# Patient Record
Sex: FEMALE | Race: WHITE | NOT HISPANIC OR LATINO | Employment: UNEMPLOYED | URBAN - METROPOLITAN AREA
[De-identification: names, ages, dates, MRNs, and addresses within clinical notes are randomized per-mention and may not be internally consistent; named-entity substitution may affect disease eponyms.]

---

## 2019-09-03 ENCOUNTER — OFFICE VISIT (OUTPATIENT)
Dept: FAMILY MEDICINE | Facility: CLINIC | Age: 54
End: 2019-09-03
Payer: COMMERCIAL

## 2019-09-03 VITALS
SYSTOLIC BLOOD PRESSURE: 100 MMHG | WEIGHT: 187.13 LBS | DIASTOLIC BLOOD PRESSURE: 72 MMHG | OXYGEN SATURATION: 99 % | BODY MASS INDEX: 31.95 KG/M2 | HEART RATE: 68 BPM | HEIGHT: 64 IN | RESPIRATION RATE: 18 BRPM

## 2019-09-03 DIAGNOSIS — I10 HYPERTENSION, UNSPECIFIED TYPE: Primary | ICD-10-CM

## 2019-09-03 DIAGNOSIS — E78.5 HYPERLIPIDEMIA, UNSPECIFIED HYPERLIPIDEMIA TYPE: ICD-10-CM

## 2019-09-03 DIAGNOSIS — Z12.39 BREAST CANCER SCREENING: ICD-10-CM

## 2019-09-03 DIAGNOSIS — Z23 NEED FOR SHINGLES VACCINE: ICD-10-CM

## 2019-09-03 DIAGNOSIS — Z11.59 NEED FOR HEPATITIS C SCREENING TEST: ICD-10-CM

## 2019-09-03 DIAGNOSIS — Z13.820 SCREENING FOR OSTEOPOROSIS: ICD-10-CM

## 2019-09-03 PROCEDURE — 99204 PR OFFICE/OUTPT VISIT, NEW, LEVL IV, 45-59 MIN: ICD-10-PCS | Mod: S$GLB,,, | Performed by: NURSE PRACTITIONER

## 2019-09-03 PROCEDURE — 99999 PR PBB SHADOW E&M-NEW PATIENT-LVL IV: ICD-10-PCS | Mod: PBBFAC,,, | Performed by: NURSE PRACTITIONER

## 2019-09-03 PROCEDURE — 3008F BODY MASS INDEX DOCD: CPT | Mod: S$GLB,,, | Performed by: NURSE PRACTITIONER

## 2019-09-03 PROCEDURE — 3008F PR BODY MASS INDEX (BMI) DOCUMENTED: ICD-10-PCS | Mod: S$GLB,,, | Performed by: NURSE PRACTITIONER

## 2019-09-03 PROCEDURE — 99204 OFFICE O/P NEW MOD 45 MIN: CPT | Mod: S$GLB,,, | Performed by: NURSE PRACTITIONER

## 2019-09-03 PROCEDURE — 99999 PR PBB SHADOW E&M-NEW PATIENT-LVL IV: CPT | Mod: PBBFAC,,, | Performed by: NURSE PRACTITIONER

## 2019-09-03 RX ORDER — MAGNESIUM 200 MG
2 TABLET ORAL DAILY
COMMUNITY
End: 2019-09-09 | Stop reason: DRUGHIGH

## 2019-09-03 RX ORDER — NEBIVOLOL 5 MG/1
5 TABLET ORAL DAILY
COMMUNITY
End: 2019-09-03 | Stop reason: SDUPTHER

## 2019-09-03 RX ORDER — MAGNESIUM HYDROXIDE 400 MG/5ML
5000 SUSPENSION, ORAL (FINAL DOSE FORM) ORAL DAILY
COMMUNITY

## 2019-09-03 RX ORDER — NEBIVOLOL 5 MG/1
5 TABLET ORAL DAILY
Qty: 90 TABLET | Refills: 3 | Status: SHIPPED | OUTPATIENT
Start: 2019-09-03 | End: 2020-08-03

## 2019-09-03 RX ORDER — DOCUSATE SODIUM 100 MG/1
2 CAPSULE, LIQUID FILLED ORAL DAILY
Status: ON HOLD | COMMUNITY
End: 2019-10-21 | Stop reason: HOSPADM

## 2019-09-03 RX ORDER — TRIAMTERENE/HYDROCHLOROTHIAZID 37.5-25 MG
1 TABLET ORAL DAILY
Qty: 90 TABLET | Refills: 3 | Status: SHIPPED | OUTPATIENT
Start: 2019-09-03 | End: 2020-09-21

## 2019-09-03 RX ORDER — AMLODIPINE BESYLATE 2.5 MG/1
2.5 TABLET ORAL DAILY
Qty: 90 TABLET | Refills: 3 | Status: SHIPPED | OUTPATIENT
Start: 2019-09-03 | End: 2020-08-31

## 2019-09-03 RX ORDER — MV/FA/DHA/EPA/FISH OIL/SAW/GNK 400MCG-200
500 COMBINATION PACKAGE (EA) ORAL DAILY
COMMUNITY
End: 2020-04-22

## 2019-09-03 RX ORDER — GUAIFENESIN AND PHENYLEPHRINE HCL 400; 10 MG/1; MG/1
500 TABLET ORAL DAILY
COMMUNITY

## 2019-09-03 RX ORDER — ACETAMINOPHEN 500 MG
5000 TABLET ORAL DAILY
COMMUNITY

## 2019-09-03 RX ORDER — TRIAMTERENE/HYDROCHLOROTHIAZID 37.5-25 MG
TABLET ORAL
COMMUNITY
End: 2019-09-03 | Stop reason: SDUPTHER

## 2019-09-03 RX ORDER — AMLODIPINE BESYLATE 2.5 MG/1
2.5 TABLET ORAL DAILY
COMMUNITY
End: 2019-09-03 | Stop reason: SDUPTHER

## 2019-09-05 ENCOUNTER — LAB VISIT (OUTPATIENT)
Dept: LAB | Facility: HOSPITAL | Age: 54
End: 2019-09-05
Attending: NURSE PRACTITIONER
Payer: COMMERCIAL

## 2019-09-05 DIAGNOSIS — E78.5 HYPERLIPIDEMIA, UNSPECIFIED HYPERLIPIDEMIA TYPE: ICD-10-CM

## 2019-09-05 DIAGNOSIS — Z11.59 NEED FOR HEPATITIS C SCREENING TEST: ICD-10-CM

## 2019-09-05 LAB
CHOLEST SERPL-MCNC: 143 MG/DL (ref 120–199)
CHOLEST/HDLC SERPL: 5.3 {RATIO} (ref 2–5)
HDLC SERPL-MCNC: 27 MG/DL (ref 40–75)
HDLC SERPL: 18.9 % (ref 20–50)
LDLC SERPL CALC-MCNC: 47.8 MG/DL (ref 63–159)
NONHDLC SERPL-MCNC: 116 MG/DL
TRIGL SERPL-MCNC: 341 MG/DL (ref 30–150)

## 2019-09-05 PROCEDURE — 80061 LIPID PANEL: CPT

## 2019-09-05 PROCEDURE — 36415 COLL VENOUS BLD VENIPUNCTURE: CPT

## 2019-09-05 PROCEDURE — 86803 HEPATITIS C AB TEST: CPT

## 2019-09-06 LAB — HCV AB S/CO SERPL IA: <0.1 S/CO RATIO (ref 0–0.9)

## 2019-09-09 ENCOUNTER — TELEPHONE (OUTPATIENT)
Dept: SURGERY | Facility: CLINIC | Age: 54
End: 2019-09-09

## 2019-09-09 ENCOUNTER — OFFICE VISIT (OUTPATIENT)
Dept: SURGERY | Facility: CLINIC | Age: 54
End: 2019-09-09
Payer: COMMERCIAL

## 2019-09-09 VITALS
SYSTOLIC BLOOD PRESSURE: 146 MMHG | HEIGHT: 64 IN | TEMPERATURE: 99 F | DIASTOLIC BLOOD PRESSURE: 93 MMHG | HEART RATE: 83 BPM | WEIGHT: 188 LBS | BODY MASS INDEX: 32.1 KG/M2

## 2019-09-09 DIAGNOSIS — K57.32 DIVERTICULITIS OF LARGE INTESTINE WITHOUT PERFORATION OR ABSCESS WITHOUT BLEEDING: Primary | ICD-10-CM

## 2019-09-09 PROCEDURE — 99999 PR PBB SHADOW E&M-EST. PATIENT-LVL III: ICD-10-PCS | Mod: PBBFAC,,, | Performed by: SURGERY

## 2019-09-09 PROCEDURE — 99204 PR OFFICE/OUTPT VISIT, NEW, LEVL IV, 45-59 MIN: ICD-10-PCS | Mod: S$GLB,,, | Performed by: SURGERY

## 2019-09-09 PROCEDURE — 99204 OFFICE O/P NEW MOD 45 MIN: CPT | Mod: S$GLB,,, | Performed by: SURGERY

## 2019-09-09 PROCEDURE — 99999 PR PBB SHADOW E&M-EST. PATIENT-LVL III: CPT | Mod: PBBFAC,,, | Performed by: SURGERY

## 2019-09-09 RX ORDER — ZINC GLUCONATE 50 MG
1 TABLET ORAL DAILY
COMMUNITY

## 2019-09-09 NOTE — PROGRESS NOTES
SUBJECTIVE:      Patient ID: Dainela Lawson is a 54 y.o. female.    Chief Complaint: Establish Care    Establishing care - recently moved here from [place] with her , no physical complaints today, no prior PCP    Discussed outstanding health maintenance - established with Dr. Field for gyn    Hypertension   This is a chronic problem. The current episode started more than 1 year ago. The problem is controlled. Pertinent negatives include no chest pain, neck pain, palpitations or shortness of breath. Risk factors for coronary artery disease include dyslipidemia, obesity, post-menopausal state, sedentary lifestyle and family history. Past treatments include beta blockers, calcium channel blockers and diuretics. The current treatment provides significant improvement. Compliance problems include exercise and diet.        Past Surgical History:   Procedure Laterality Date     SECTION      CHOLECYSTECTOMY      HYSTERECTOMY      SHOULDER ARTHROSCOPY Bilateral     STOMACH SURGERY      Possible Nissen per pt- To treat acid reflux     TUBAL LIGATION  1990     Family History   Problem Relation Age of Onset    Coronary artery disease Mother     Hyperlipidemia Mother     Other Mother         Breast Nodules    Arthritis Mother     Hypertension Father     Atrial fibrillation Father     Neuropathy Father     Other Father         Bypass    Diabetes Father     Hypertension Sister     Cancer Sister         Breast Cacner    Neuropathy Sister     Hypertension Daughter     No Known Problems Son     Diabetes Maternal Grandmother     Hypertension Maternal Grandmother     No Known Problems Maternal Grandfather     Coronary artery disease Paternal Grandmother     Cancer Paternal Grandmother         Breast Cancer    Heart attack Paternal Grandmother     Dementia Paternal Grandfather     Heart attack Paternal Grandfather     No Known Problems Sister     Other Daughter          Benign Hypertension(Brain)      Social History     Socioeconomic History    Marital status:      Spouse name: Not on file    Number of children: Not on file    Years of education: Not on file    Highest education level: Not on file   Occupational History    Not on file   Social Needs    Financial resource strain: Not on file    Food insecurity:     Worry: Not on file     Inability: Not on file    Transportation needs:     Medical: Not on file     Non-medical: Not on file   Tobacco Use    Smoking status: Never Smoker    Smokeless tobacco: Never Used   Substance and Sexual Activity    Alcohol use: Yes     Alcohol/week: 1.8 oz     Types: 3 Glasses of wine per week     Frequency: Monthly or less     Comment: 3 glasses of wine per week     Drug use: Never    Sexual activity: Not on file   Lifestyle    Physical activity:     Days per week: Not on file     Minutes per session: Not on file    Stress: Not on file   Relationships    Social connections:     Talks on phone: Not on file     Gets together: Not on file     Attends Worship service: Not on file     Active member of club or organization: Not on file     Attends meetings of clubs or organizations: Not on file     Relationship status: Not on file   Other Topics Concern    Not on file   Social History Narrative    Not on file     Current Outpatient Medications   Medication Sig Dispense Refill    amLODIPine (NORVASC) 2.5 MG tablet Take 1 tablet (2.5 mg total) by mouth Daily. 90 tablet 3    cholecalciferol, vitamin D3, (VITAMIN D3) 5,000 unit Tab Vitamin D3   5000 iu      cyanocobalamin, vitamin B-12, 5,000 mcg TbDL Vitamin B12   5000 mg      docusate sodium (COLACE) 100 MG capsule Take 2 tablets by mouth Daily.      FLAXSEED OIL ORAL Take 1,300 mg by mouth Daily.      krill oil 500 mg Cap krill oil   500 mg      multivit-min/iron/folic/lutein (MULTIVITAMIN WOMEN 50 PLUS ORAL) Take 1 tablet by mouth Daily.      nebivolol (BYSTOLIC) 5  MG Tab Take 1 tablet (5 mg total) by mouth Daily. 90 tablet 3    triamterene-hydrochlorothiazide 37.5-25 mg (MAXZIDE-25MG) 37.5-25 mg per tablet Take 1 tablet by mouth once daily. 90 tablet 3    turmeric root extract 500 mg Cap turmeric   500 mg      glucosamine HCl/chondroitin pradhan (GLUCOSAMINE-CHONDROITIN ORAL) Take 1 tablet by mouth once daily.      magnesium oxide 500 mg Cap Take 1 capsule by mouth once daily.       No current facility-administered medications for this visit.      Review of patient's allergies indicates:   Allergen Reactions    Lisinopril     Meloxicam     Meperidine     Oxycodone-acetaminophen       Past Medical History:   Diagnosis Date    Asthma     Diverticulitis     GERD (gastroesophageal reflux disease)     Hypertension      Past Surgical History:   Procedure Laterality Date     SECTION      CHOLECYSTECTOMY      HYSTERECTOMY      SHOULDER ARTHROSCOPY Bilateral     STOMACH SURGERY      Possible Nissen per pt- To treat acid reflux     TUBAL LIGATION  1990       Review of Systems   Constitutional: Negative for activity change, appetite change, fatigue and unexpected weight change.   HENT: Negative for congestion, ear pain, hearing loss, postnasal drip, sinus pressure, sinus pain, sneezing and sore throat.    Eyes: Negative for photophobia and pain.   Respiratory: Negative for cough, chest tightness, shortness of breath and wheezing.    Cardiovascular: Negative for chest pain, palpitations and leg swelling.   Gastrointestinal: Negative for abdominal distention, abdominal pain, blood in stool, constipation, diarrhea, nausea and vomiting.   Endocrine: Negative for cold intolerance, heat intolerance, polydipsia and polyuria.   Genitourinary: Negative for difficulty urinating, dysuria, flank pain, frequency, hematuria, pelvic pain and urgency.   Musculoskeletal: Negative for arthralgias, back pain, joint swelling, myalgias and neck pain.   Skin: Negative for  "pallor.   Allergic/Immunologic: Negative for environmental allergies and food allergies.   Neurological: Negative for dizziness, weakness, light-headedness and numbness.   Hematological: Does not bruise/bleed easily.   Psychiatric/Behavioral: Negative for agitation, confusion, decreased concentration and sleep disturbance. The patient is not nervous/anxious.       OBJECTIVE:      Vitals:    09/03/19 1500   BP: 100/72   Pulse: 68   Resp: 18   SpO2: 99%   Weight: 84.9 kg (187 lb 1.6 oz)   Height: 5' 4" (1.626 m)     Physical Exam   Constitutional: She is oriented to person, place, and time. Vital signs are normal. She appears well-developed and well-nourished. No distress.   obese   HENT:   Head: Normocephalic and atraumatic.   Right Ear: Hearing normal.   Left Ear: Hearing normal.   Nose: Nose normal. No rhinorrhea.   Mouth/Throat: Mucous membranes are normal.   Eyes: Pupils are equal, round, and reactive to light. Conjunctivae and lids are normal. Right eye exhibits no discharge. Left eye exhibits no discharge. Right conjunctiva is not injected. Left conjunctiva is not injected. Right pupil is round and reactive. Left pupil is round and reactive. Pupils are equal.   Neck: Trachea normal and normal range of motion. Neck supple. No JVD present. No tracheal deviation present. No thyromegaly present.   Cardiovascular: Normal rate, regular rhythm, normal heart sounds and intact distal pulses. Exam reveals no gallop and no friction rub.   No murmur heard.  Pulses:       Radial pulses are 2+ on the right side, and 2+ on the left side.   Pulmonary/Chest: Effort normal and breath sounds normal. No stridor. No respiratory distress. She has no decreased breath sounds. She has no wheezes. She has no rhonchi. She has no rales.   Abdominal: Soft. Bowel sounds are normal. She exhibits no distension. There is no tenderness. There is no rigidity and no guarding.   Musculoskeletal: Normal range of motion. She exhibits no edema. "   Lymphadenopathy:     She has no cervical adenopathy.   Neurological: She is alert and oriented to person, place, and time. She has normal strength. She displays no atrophy. She displays a negative Romberg sign. Coordination and gait normal.   Skin: Skin is warm and dry. Capillary refill takes less than 2 seconds. No lesion and no rash noted. No cyanosis. No pallor.   Psychiatric: She has a normal mood and affect. Her speech is normal and behavior is normal. Judgment and thought content normal. Cognition and memory are normal. She is attentive.   Nursing note and vitals reviewed.     Assessment:       1. Hypertension, unspecified type    2. Hyperlipidemia, unspecified hyperlipidemia type    3. Screening for osteoporosis    4. Breast cancer screening    5. Need for hepatitis C screening test    6. Need for shingles vaccine        Plan:       Hypertension, unspecified type  -     amLODIPine (NORVASC) 2.5 MG tablet; Take 1 tablet (2.5 mg total) by mouth Daily.  Dispense: 90 tablet; Refill: 3  -     nebivolol (BYSTOLIC) 5 MG Tab; Take 1 tablet (5 mg total) by mouth Daily.  Dispense: 90 tablet; Refill: 3  -     triamterene-hydrochlorothiazide 37.5-25 mg (MAXZIDE-25MG) 37.5-25 mg per tablet; Take 1 tablet by mouth once daily.  Dispense: 90 tablet; Refill: 3    Hyperlipidemia, unspecified hyperlipidemia type  -     Lipid panel; Future; Expected date: 12/03/2019    Screening for osteoporosis  -     DXA Bone Density Appendicular Skeleton; Future; Expected date: 09/03/2019    Breast cancer screening  -     Mammo Digital Screening Bilat without CA; Future; Expected date: 09/03/2019    Need for hepatitis C screening test  -     Hepatitis C antibody; Future; Expected date: 09/03/2019    Need for shingles vaccine  -     varicella-zoster [id number], PF, (SHINGRIX, PF,) 50 mcg/0.5 mL injection; Inject 0.5 mLs into the muscle once. 2nd dose to be administered 2-6 months following initial administration for 1 dose  Dispense: 0.5 mL;  Refill: 1        Follow up in about 4 weeks (around 10/1/2019) for lipid lab review.      9/9/2019 CHITO Shea, FNP-C

## 2019-09-09 NOTE — LETTER
September 9, 2019      Guy Clements MD  [address]  [place] [address]           [facility]  [address]  [place] [address]  Phone: [phone]  Fax: [phone]          Patient: Daniela Lawson   MR Number: [id number]   YOB: 1965   Date of Visit: 9/9/2019       Dear Dr. Guy Robersonor:    Thank you for referring Daniela Lawson to me for evaluation. Attached you will find relevant portions of my assessment and plan of care.    If you have questions, please do not hesitate to call me. I look forward to following Daniela Lawson along with you.    Sincerely,    Doug Gifford MD    Enclosure  CC:  No Recipients    If you would like to receive this communication electronically, please contact [email or web address] or [phone] to request more information on [facility] access.    For providers and/or their staff who would like to refer a patient to [facility], please contact us through our one-stop-shop provider referral line, [facility], at [phone].    If you feel you have received this communication in error or would no longer like to receive these types of communications, please e-mail [email or web address]

## 2019-09-09 NOTE — PROGRESS NOTES
Subjective:       Patient ID: Daniela Lawson is a 54 y.o. female.    Chief Complaint: Consult (Referred by Dr. Clements to eval diverticulitis )      HPI:  Patient presents to discuss possible surgical treatment of her current acute diverticulitis.  Patient has had approximately 4 flare ups in last 6 months 1 of which necessitated admission.  At least 2 of the flare-ups have been documented on CT scan.  Approximately  patient had 2 other flare-ups.  Has been asymptomatic in between.  Had a colonoscopy about a week ago which showed diverticulosis and 1 diverticulum actively draining pus.  No overall inflammation was seen.  Patient is asymptomatic at this time.  The area of abnormality was tattooed.    Past Medical History:   Diagnosis Date    Asthma     Diverticulitis     GERD (gastroesophageal reflux disease)     Hypertension      Past Surgical History:   Procedure Laterality Date     SECTION      CHOLECYSTECTOMY      HYSTERECTOMY      SHOULDER ARTHROSCOPY Bilateral     STOMACH SURGERY      Possible Nissen per pt- To treat acid reflux     TUBAL LIGATION  1990     Review of patient's allergies indicates:   Allergen Reactions    Lisinopril     Meloxicam     Meperidine     Oxycodone-acetaminophen      Medication List with Changes/Refills   Current Medications    AMLODIPINE (NORVASC) 2.5 MG TABLET    Take 1 tablet (2.5 mg total) by mouth Daily.    CHOLECALCIFEROL, VITAMIN D3, (VITAMIN D3) 5,000 UNIT TAB    Vitamin D3   5000 iu    CYANOCOBALAMIN, VITAMIN B-12, 5,000 MCG TBDL    Vitamin B12   5000 mg    DOCUSATE SODIUM (COLACE) 100 MG CAPSULE    Take 2 tablets by mouth Daily.    FLAXSEED OIL ORAL    Take 1,300 mg by mouth Daily.    GLUCOSAMINE HCL/CHONDROITIN LEDESMA (GLUCOSAMINE-CHONDROITIN ORAL)    Take 1 tablet by mouth once daily.    KRILL  MG CAP    krill oil   500 mg    MAGNESIUM OXIDE 500 MG CAP    Take 1 capsule by mouth once daily.    MULTIVIT-MIN/IRON/FOLIC/LUTEIN  (MULTIVITAMIN WOMEN 50 PLUS ORAL)    Take 1 tablet by mouth Daily.    NEBIVOLOL (BYSTOLIC) 5 MG TAB    Take 1 tablet (5 mg total) by mouth Daily.    TRIAMTERENE-HYDROCHLOROTHIAZIDE 37.5-25 MG (MAXZIDE-25MG) 37.5-25 MG PER TABLET    Take 1 tablet by mouth once daily.    TURMERIC ROOT EXTRACT 500 MG CAP    turmeric   500 mg   Discontinued Medications    GLUCOSAMINE SULFATE (SYNOVACIN ORAL)    1,500 mg.    MAGNESIUM 200 MG TAB    Take 2 tablets by mouth Daily.     Family History   Problem Relation Age of Onset    Coronary artery disease Mother     Hyperlipidemia Mother     Other Mother         Breast Nodules    Arthritis Mother     Hypertension Father     Atrial fibrillation Father     Neuropathy Father     Other Father         Bypass    Diabetes Father     Hypertension Sister     Cancer Sister         Breast Cacner    Neuropathy Sister     Hypertension Daughter     No Known Problems Son     Diabetes Maternal Grandmother     Hypertension Maternal Grandmother     No Known Problems Maternal Grandfather     Coronary artery disease Paternal Grandmother     Cancer Paternal Grandmother         Breast Cancer    Heart attack Paternal Grandmother     Dementia Paternal Grandfather     Heart attack Paternal Grandfather     No Known Problems Sister     Other Daughter         Benign Hypertension(Brain)     Social History     Socioeconomic History    Marital status:      Spouse name: Not on file    Number of children: Not on file    Years of education: Not on file    Highest education level: Not on file   Occupational History    Not on file   Social Needs    Financial resource strain: Not on file    Food insecurity:     Worry: Not on file     Inability: Not on file    Transportation needs:     Medical: Not on file     Non-medical: Not on file   Tobacco Use    Smoking status: Never Smoker    Smokeless tobacco: Never Used   Substance and Sexual Activity    Alcohol use: Yes     Alcohol/week:  1.8 oz     Types: 3 Glasses of wine per week     Frequency: Monthly or less     Comment: 3 glasses of wine per week     Drug use: Never    Sexual activity: Not on file   Lifestyle    Physical activity:     Days per week: Not on file     Minutes per session: Not on file    Stress: Not on file   Relationships    Social connections:     Talks on phone: Not on file     Gets together: Not on file     Attends [employer] service: Not on file     Active member of club or organization: Not on file     Attends meetings of clubs or organizations: Not on file     Relationship status: Not on file   Other Topics Concern    Not on file   Social History Narrative    Not on file         Review of Systems   Constitutional: Negative for appetite change, chills, fever and unexpected weight change.   HENT: Negative for hearing loss, rhinorrhea, sore throat and voice change.    Eyes: Negative for photophobia and visual disturbance.   Respiratory: Negative for cough, choking and shortness of breath.    Cardiovascular: Negative for chest pain, palpitations and leg swelling.   Gastrointestinal: Negative for abdominal pain, blood in stool, constipation, diarrhea, nausea and vomiting.   Endocrine: Negative for cold intolerance, heat intolerance, polydipsia and polyuria.   Musculoskeletal: Negative for arthralgias, back pain, joint swelling and neck stiffness.   Skin: Negative for color change, pallor and rash.   Neurological: Negative for dizziness, seizures, syncope and headaches.   Hematological: Negative for adenopathy. Does not bruise/bleed easily.   Psychiatric/Behavioral: Negative for agitation, behavioral problems and confusion.       Objective:      Physical Exam   Constitutional: She appears well-developed and well-nourished.  Non-toxic appearance. No distress.   HENT:   Head: Normocephalic and atraumatic. Head is without abrasion and without laceration.   Right Ear: External ear normal.   Left Ear: External ear normal.   Nose:  Nose normal.   Mouth/Throat: Oropharynx is clear and moist.   Eyes: Pupils are equal, round, and reactive to light. EOM are normal.   Neck: Trachea normal. No tracheal deviation and normal range of motion present. No thyroid mass and no thyromegaly present.   Cardiovascular: Normal rate and regular rhythm.   Pulmonary/Chest: Effort normal. No accessory muscle usage. No tachypnea. No respiratory distress.   Abdominal: Soft. Normal appearance and bowel sounds are normal. She exhibits no distension and no mass. There is no hepatosplenomegaly. There is no tenderness. There is no tenderness at McBurney's point and negative Broussard's sign. No hernia.   Lymphadenopathy:     She has no cervical adenopathy.     She has no axillary adenopathy.        Right: No inguinal adenopathy present.        Left: No inguinal adenopathy present.   Neurological: She is alert. Coordination and gait normal.   Skin: Skin is warm and intact.   Psychiatric: She has a normal mood and affect. Her speech is normal and behavior is normal.       Assessment/Plan:   Diverticulitis of large intestine without perforation or abscess without bleeding        Planned procedure:  Laparoscopic-assisted sigmoid colectomy possible open    Extensive discussion about surgical treatment, recovery, possible complications held with patient and her .    Follow up in about 3 weeks (around 9/30/2019).

## 2019-09-09 NOTE — TELEPHONE ENCOUNTER
Spoke to Tereza torres/ Dr. Ruffin's office. 10/16/19 ok for stents. I informed her pt returning to office 9/30/19 for preop, so we will fax everything to her then.

## 2019-09-16 DIAGNOSIS — Z78.0 POSTMENOPAUSAL: Primary | ICD-10-CM

## 2019-09-18 ENCOUNTER — PATIENT MESSAGE (OUTPATIENT)
Dept: FAMILY MEDICINE | Facility: CLINIC | Age: 54
End: 2019-09-18

## 2019-09-24 ENCOUNTER — HOSPITAL ENCOUNTER (OUTPATIENT)
Dept: RADIOLOGY | Facility: HOSPITAL | Age: 54
Discharge: HOME | End: 2019-09-24
Attending: NURSE PRACTITIONER
Payer: COMMERCIAL

## 2019-09-24 VITALS — BODY MASS INDEX: 32.11 KG/M2 | HEIGHT: 64 IN | WEIGHT: 188.06 LBS

## 2019-09-24 DIAGNOSIS — Z78.0 POSTMENOPAUSAL: ICD-10-CM

## 2019-09-24 DIAGNOSIS — Z12.39 BREAST CANCER SCREENING: ICD-10-CM

## 2019-09-24 PROCEDURE — 77080 DXA BONE DENSITY AXIAL: CPT | Mod: TC,PO

## 2019-09-24 PROCEDURE — 77067 SCR MAMMO BI INCL CAD: CPT | Mod: TC,PO

## 2019-09-26 ENCOUNTER — TELEPHONE (OUTPATIENT)
Dept: FAMILY MEDICINE | Facility: CLINIC | Age: 54
End: 2019-09-26

## 2019-09-26 NOTE — TELEPHONE ENCOUNTER
----- Message from Yoni Morelos, CHITO,FNP-C sent at 9/25/2019  6:18 PM CDT -----  Dexa shows normal bone

## 2019-09-30 ENCOUNTER — OFFICE VISIT (OUTPATIENT)
Dept: SURGERY | Facility: CLINIC | Age: 54
End: 2019-09-30
Payer: COMMERCIAL

## 2019-09-30 VITALS
SYSTOLIC BLOOD PRESSURE: 142 MMHG | BODY MASS INDEX: 32.1 KG/M2 | WEIGHT: 188 LBS | HEART RATE: 76 BPM | HEIGHT: 64 IN | DIASTOLIC BLOOD PRESSURE: 90 MMHG | TEMPERATURE: 98 F

## 2019-09-30 DIAGNOSIS — K57.32 DIVERTICULITIS OF LARGE INTESTINE WITHOUT PERFORATION OR ABSCESS WITHOUT BLEEDING: Primary | ICD-10-CM

## 2019-09-30 PROCEDURE — 99214 OFFICE O/P EST MOD 30 MIN: CPT | Mod: S$GLB,,, | Performed by: SURGERY

## 2019-09-30 PROCEDURE — 99999 PR PBB SHADOW E&M-EST. PATIENT-LVL IV: ICD-10-PCS | Mod: PBBFAC,,, | Performed by: SURGERY

## 2019-09-30 PROCEDURE — 99999 PR PBB SHADOW E&M-EST. PATIENT-LVL IV: CPT | Mod: PBBFAC,,, | Performed by: SURGERY

## 2019-09-30 PROCEDURE — 99214 PR OFFICE/OUTPT VISIT, EST, LEVL IV, 30-39 MIN: ICD-10-PCS | Mod: S$GLB,,, | Performed by: SURGERY

## 2019-09-30 RX ORDER — LIDOCAINE HYDROCHLORIDE 10 MG/ML
1 INJECTION, SOLUTION EPIDURAL; INFILTRATION; INTRACAUDAL; PERINEURAL ONCE
Status: DISCONTINUED | OUTPATIENT
Start: 2019-09-30 | End: 2019-10-21 | Stop reason: HOSPADM

## 2019-09-30 RX ORDER — SODIUM CHLORIDE 9 MG/ML
INJECTION, SOLUTION INTRAVENOUS CONTINUOUS
Status: CANCELLED | OUTPATIENT
Start: 2019-09-30

## 2019-09-30 RX ORDER — METRONIDAZOLE 500 MG/1
500 TABLET ORAL
Status: ON HOLD | COMMUNITY
End: 2019-10-21 | Stop reason: HOSPADM

## 2019-09-30 RX ORDER — CIPROFLOXACIN 500 MG/1
500 TABLET ORAL
COMMUNITY
End: 2019-10-24

## 2019-09-30 NOTE — H&P (VIEW-ONLY)
Subjective:       Patient ID: Daniela Lawson is a 54 y.o. female.    Chief Complaint: Follow-up (Diverticulitis FU; Discuss surgery )      HPI:  Patient presents to discuss possible surgical treatment of her current acute diverticulitis.  Patient has had approximately 4 flare ups in last 6 months 1 of which necessitated admission.  At least 2 of the flare-ups have been documented on CT scan.  Approximately  patient had 2 other flare-ups.  Has been asymptomatic in between.  Had a colonoscopy about a week ago which showed diverticulosis and 1 diverticulum actively draining pus.  No overall inflammation was seen.    The area of abnormality was tattooed.  Three days prior to this visit patient developed left lower quadrant pains consistent with her previous flare-ups.  She had started herself on antibiotics which she already had.  We discussed various options as far as postponing surgery, doing radiologic workup, proceeding with surgery as planned after a 2 week course of antibiotics.  Patient and  understand the ramifications of each approach.  Because of patient's frequent attacks patient would like to take 2 week course of antibiotics and proceed with surgery. She would like to have a CT scan after the antibiotic completion so she would know the extent of the disease present prior to surgery. She understands there is a strong possibility of either colostomy or ileostomy if disease not respond.    Past Medical History:   Diagnosis Date    Asthma     Diverticulitis     GERD (gastroesophageal reflux disease)     Hypertension      Past Surgical History:   Procedure Laterality Date     SECTION      CHOLECYSTECTOMY      HYSTERECTOMY  2003    SHOULDER ARTHROSCOPY Bilateral     STOMACH SURGERY      Possible Nissen per pt- To treat acid reflux     TUBAL LIGATION  1990     Review of patient's allergies indicates:   Allergen Reactions    Lisinopril     Meloxicam     Meperidine      Oxycodone-acetaminophen      Medication List with Changes/Refills   Current Medications    AMLODIPINE (NORVASC) 2.5 MG TABLET    Take 1 tablet (2.5 mg total) by mouth Daily.    CHOLECALCIFEROL, VITAMIN D3, (VITAMIN D3) 5,000 UNIT TAB    Vitamin D3   5000 iu    CIPROFLOXACIN HCL (CIPRO) 500 MG TABLET    Take 500 mg by mouth every 12 (twelve) hours.    CYANOCOBALAMIN, VITAMIN B-12, 5,000 MCG TBDL    Vitamin B12   5000 mg    DOCUSATE SODIUM (COLACE) 100 MG CAPSULE    Take 2 tablets by mouth Daily.    FLAXSEED OIL ORAL    Take 1,300 mg by mouth Daily.    GLUCOSAMINE HCL/CHONDROITIN LEDESAM (GLUCOSAMINE-CHONDROITIN ORAL)    Take 1 tablet by mouth once daily.    KRILL  MG CAP    krill oil   500 mg    MAGNESIUM OXIDE 500 MG CAP    Take 1 capsule by mouth once daily.    METRONIDAZOLE (FLAGYL) 500 MG TABLET    Take 500 mg by mouth 3 (three) times daily with meals.    MULTIVIT-MIN/IRON/FOLIC/LUTEIN (MULTIVITAMIN WOMEN 50 PLUS ORAL)    Take 1 tablet by mouth Daily.    NEBIVOLOL (BYSTOLIC) 5 MG TAB    Take 1 tablet (5 mg total) by mouth Daily.    TRIAMTERENE-HYDROCHLOROTHIAZIDE 37.5-25 MG (MAXZIDE-25MG) 37.5-25 MG PER TABLET    Take 1 tablet by mouth once daily.    TURMERIC ROOT EXTRACT 500 MG CAP    turmeric   500 mg     Family History   Problem Relation Age of Onset    Coronary artery disease Mother     Hyperlipidemia Mother     Other Mother         Breast Nodules    Arthritis Mother     Hypertension Father     Atrial fibrillation Father     Neuropathy Father     Other Father         Bypass    Diabetes Father     Hypertension Sister     Cancer Sister         Breast Cacner    Neuropathy Sister     Hypertension Daughter     No Known Problems Son     Diabetes Maternal Grandmother     Hypertension Maternal Grandmother     No Known Problems Maternal Grandfather     Coronary artery disease Paternal Grandmother     Cancer Paternal Grandmother         Breast Cancer    Heart attack Paternal Grandmother      Dementia Paternal Grandfather     Heart attack Paternal Grandfather     No Known Problems Sister     Other Daughter         Benign Hypertension(Brain)    Breast cancer Maternal Aunt     Breast cancer Paternal Aunt      Social History     Socioeconomic History    Marital status:      Spouse name: Not on file    Number of children: Not on file    Years of education: Not on file    Highest education level: Not on file   Occupational History    Not on file   Social Needs    Financial resource strain: Not on file    Food insecurity:     Worry: Not on file     Inability: Not on file    Transportation needs:     Medical: Not on file     Non-medical: Not on file   Tobacco Use    Smoking status: Never Smoker    Smokeless tobacco: Never Used   Substance and Sexual Activity    Alcohol use: Yes     Alcohol/week: 3.0 standard drinks     Types: 3 Glasses of wine per week     Frequency: Monthly or less     Comment: 3 glasses of wine per week     Drug use: Never    Sexual activity: Not on file   Lifestyle    Physical activity:     Days per week: Not on file     Minutes per session: Not on file    Stress: Not on file   Relationships    Social connections:     Talks on phone: Not on file     Gets together: Not on file     Attends Gnosticism service: Not on file     Active member of club or organization: Not on file     Attends meetings of clubs or organizations: Not on file     Relationship status: Not on file   Other Topics Concern    Not on file   Social History Narrative    Not on file         Review of Systems   Constitutional: Negative for appetite change, chills, fever and unexpected weight change.   HENT: Negative for hearing loss, rhinorrhea, sore throat and voice change.    Eyes: Negative for photophobia and visual disturbance.   Respiratory: Negative for cough, choking and shortness of breath.    Cardiovascular: Negative for chest pain, palpitations and leg swelling.   Gastrointestinal: Negative  for abdominal pain, blood in stool, constipation, diarrhea, nausea and vomiting.   Endocrine: Negative for cold intolerance, heat intolerance, polydipsia and polyuria.   Musculoskeletal: Negative for arthralgias, back pain, joint swelling and neck stiffness.   Skin: Negative for color change, pallor and rash.   Neurological: Negative for dizziness, seizures, syncope and headaches.   Hematological: Negative for adenopathy. Does not bruise/bleed easily.   Psychiatric/Behavioral: Negative for agitation, behavioral problems and confusion.       Objective:      Physical Exam   Constitutional: She appears well-developed and well-nourished.  Non-toxic appearance. No distress.   HENT:   Head: Normocephalic and atraumatic. Head is without abrasion and without laceration.   Right Ear: External ear normal.   Left Ear: External ear normal.   Nose: Nose normal.   Mouth/Throat: Oropharynx is clear and moist.   Eyes: Pupils are equal, round, and reactive to light. EOM are normal.   Neck: Trachea normal. No tracheal deviation and normal range of motion present. No thyroid mass and no thyromegaly present.   Cardiovascular: Normal rate and regular rhythm.   Pulmonary/Chest: Effort normal. No accessory muscle usage. No tachypnea. No respiratory distress.   Abdominal: Soft. Normal appearance and bowel sounds are normal. She exhibits no distension and no mass. There is no hepatosplenomegaly. There is tenderness in the left lower quadrant. There is no tenderness at McBurney's point and negative Broussard's sign. No hernia.   Lymphadenopathy:     She has no cervical adenopathy.     She has no axillary adenopathy.        Right: No inguinal adenopathy present.        Left: No inguinal adenopathy present.   Neurological: She is alert. Coordination and gait normal.   Skin: Skin is warm and intact.   Psychiatric: She has a normal mood and affect. Her speech is normal and behavior is normal.       Assessment/Plan:   Diverticulitis of large  intestine without perforation or abscess without bleeding  -     CT Abdomen Pelvis W Wo Contrast; Future; Expected date: 10/08/2019  -     Full code; Standing  -     Insert peripheral IV; Standing  -     Comprehensive metabolic panel; Future; Expected date: 09/30/2019  -     CBC auto differential; Future; Expected date: 09/30/2019  -     EKG 12-lead; Future  -     X-Ray Chest 1 View; Future; Expected date: 09/30/2019  -     Case Request Operating Room: COLECTOMY, SIGMOID, LAPAROSCOPIC    Other orders  -     lidocaine (PF) 10 mg/ml (1%) injection 10 mg  -     ceFOXItin (MEFOXIN) 2 g in dextrose 5 % 100 mL IVPB        Planned procedure:  Laparoscopic-assisted sigmoid colectomy possible open    Extensive discussion about surgical treatment, recovery, possible complications held with patient and her .    No follow-ups on file.

## 2019-09-30 NOTE — LETTER
September 30, 2019      Guy Clements MD  [address]  [place] [address]           [facility]  [address]  [place] [address]  Phone: [phone]  Fax: [phone]          Patient: Daniela Lawson   MR Number: [id number]   YOB: 1965   Date of Visit: 9/30/2019       Dear Dr. Guy Robersonor:    Thank you for referring Daniela Lawson to me for evaluation. Attached you will find relevant portions of my assessment and plan of care.    If you have questions, please do not hesitate to call me. I look forward to following Daniela Lawson along with you.    Sincerely,    Doug Gifford MD    Enclosure  CC:  No Recipients    If you would like to receive this communication electronically, please contact [email or web address] or [phone] to request more information on [employer] Link access.    For providers and/or their staff who would like to refer a patient to [facility], please contact us through our one-stop-shop provider referral line, [facility], at [phone].    If you feel you have received this communication in error or would no longer like to receive these types of communications, please e-mail [email or web address]

## 2019-09-30 NOTE — PROGRESS NOTES
Subjective:       Patient ID: Daniela Lawson is a 54 y.o. female.    Chief Complaint: Follow-up (Diverticulitis FU; Discuss surgery )      HPI:  Patient presents to discuss possible surgical treatment of her current acute diverticulitis.  Patient has had approximately 4 flare ups in last 6 months 1 of which necessitated admission.  At least 2 of the flare-ups have been documented on CT scan.  Approximately  patient had 2 other flare-ups.  Has been asymptomatic in between.  Had a colonoscopy about a week ago which showed diverticulosis and 1 diverticulum actively draining pus.  No overall inflammation was seen.    The area of abnormality was tattooed.  Three days prior to this visit patient developed left lower quadrant pains consistent with her previous flare-ups.  She had started herself on antibiotics which she already had.  We discussed various options as far as postponing surgery, doing radiologic workup, proceeding with surgery as planned after a 2 week course of antibiotics.  Patient and  understand the ramifications of each approach.  Because of patient's frequent attacks patient would like to take 2 week course of antibiotics and proceed with surgery. She would like to have a CT scan after the antibiotic completion so she would know the extent of the disease present prior to surgery. She understands there is a strong possibility of either colostomy or ileostomy if disease not respond.    Past Medical History:   Diagnosis Date    Asthma     Diverticulitis     GERD (gastroesophageal reflux disease)     Hypertension      Past Surgical History:   Procedure Laterality Date     SECTION      CHOLECYSTECTOMY      HYSTERECTOMY  2003    SHOULDER ARTHROSCOPY Bilateral     STOMACH SURGERY      Possible Nissen per pt- To treat acid reflux     TUBAL LIGATION  1990     Review of patient's allergies indicates:   Allergen Reactions    Lisinopril     Meloxicam     Meperidine      Oxycodone-acetaminophen      Medication List with Changes/Refills   Current Medications    AMLODIPINE (NORVASC) 2.5 MG TABLET    Take 1 tablet (2.5 mg total) by mouth Daily.    CHOLECALCIFEROL, VITAMIN D3, (VITAMIN D3) 5,000 UNIT TAB    Vitamin D3   5000 iu    CIPROFLOXACIN HCL (CIPRO) 500 MG TABLET    Take 500 mg by mouth every 12 (twelve) hours.    CYANOCOBALAMIN, VITAMIN B-12, 5,000 MCG TBDL    Vitamin B12   5000 mg    DOCUSATE SODIUM (COLACE) 100 MG CAPSULE    Take 2 tablets by mouth Daily.    FLAXSEED OIL ORAL    Take 1,300 mg by mouth Daily.    GLUCOSAMINE HCL/CHONDROITIN LEDESMA (GLUCOSAMINE-CHONDROITIN ORAL)    Take 1 tablet by mouth once daily.    KRILL  MG CAP    krill oil   500 mg    MAGNESIUM OXIDE 500 MG CAP    Take 1 capsule by mouth once daily.    METRONIDAZOLE (FLAGYL) 500 MG TABLET    Take 500 mg by mouth 3 (three) times daily with meals.    MULTIVIT-MIN/IRON/FOLIC/LUTEIN (MULTIVITAMIN WOMEN 50 PLUS ORAL)    Take 1 tablet by mouth Daily.    NEBIVOLOL (BYSTOLIC) 5 MG TAB    Take 1 tablet (5 mg total) by mouth Daily.    TRIAMTERENE-HYDROCHLOROTHIAZIDE 37.5-25 MG (MAXZIDE-25MG) 37.5-25 MG PER TABLET    Take 1 tablet by mouth once daily.    TURMERIC ROOT EXTRACT 500 MG CAP    turmeric   500 mg     Family History   Problem Relation Age of Onset    Coronary artery disease Mother     Hyperlipidemia Mother     Other Mother         Breast Nodules    Arthritis Mother     Hypertension Father     Atrial fibrillation Father     Neuropathy Father     Other Father         Bypass    Diabetes Father     Hypertension Sister     Cancer Sister         Breast Cacner    Neuropathy Sister     Hypertension Daughter     No Known Problems Son     Diabetes Maternal Grandmother     Hypertension Maternal Grandmother     No Known Problems Maternal Grandfather     Coronary artery disease Paternal Grandmother     Cancer Paternal Grandmother         Breast Cancer    Heart attack Paternal Grandmother      Dementia Paternal Grandfather     Heart attack Paternal Grandfather     No Known Problems Sister     Other Daughter         Benign Hypertension(Brain)    Breast cancer Maternal Aunt     Breast cancer Paternal Aunt      Social History     Socioeconomic History    Marital status:      Spouse name: Not on file    Number of children: Not on file    Years of education: Not on file    Highest education level: Not on file   Occupational History    Not on file   Social Needs    Financial resource strain: Not on file    Food insecurity:     Worry: Not on file     Inability: Not on file    Transportation needs:     Medical: Not on file     Non-medical: Not on file   Tobacco Use    Smoking status: Never Smoker    Smokeless tobacco: Never Used   Substance and Sexual Activity    Alcohol use: Yes     Alcohol/week: 3.0 standard drinks     Types: 3 Glasses of wine per week     Frequency: Monthly or less     Comment: 3 glasses of wine per week     Drug use: Never    Sexual activity: Not on file   Lifestyle    Physical activity:     Days per week: Not on file     Minutes per session: Not on file    Stress: Not on file   Relationships    Social connections:     Talks on phone: Not on file     Gets together: Not on file     Attends Congregational service: Not on file     Active member of club or organization: Not on file     Attends meetings of clubs or organizations: Not on file     Relationship status: Not on file   Other Topics Concern    Not on file   Social History Narrative    Not on file         Review of Systems   Constitutional: Negative for appetite change, chills, fever and unexpected weight change.   HENT: Negative for hearing loss, rhinorrhea, sore throat and voice change.    Eyes: Negative for photophobia and visual disturbance.   Respiratory: Negative for cough, choking and shortness of breath.    Cardiovascular: Negative for chest pain, palpitations and leg swelling.   Gastrointestinal: Negative  for abdominal pain, blood in stool, constipation, diarrhea, nausea and vomiting.   Endocrine: Negative for cold intolerance, heat intolerance, polydipsia and polyuria.   Musculoskeletal: Negative for arthralgias, back pain, joint swelling and neck stiffness.   Skin: Negative for color change, pallor and rash.   Neurological: Negative for dizziness, seizures, syncope and headaches.   Hematological: Negative for adenopathy. Does not bruise/bleed easily.   Psychiatric/Behavioral: Negative for agitation, behavioral problems and confusion.       Objective:      Physical Exam   Constitutional: She appears well-developed and well-nourished.  Non-toxic appearance. No distress.   HENT:   Head: Normocephalic and atraumatic. Head is without abrasion and without laceration.   Right Ear: External ear normal.   Left Ear: External ear normal.   Nose: Nose normal.   Mouth/Throat: Oropharynx is clear and moist.   Eyes: Pupils are equal, round, and reactive to light. EOM are normal.   Neck: Trachea normal. No tracheal deviation and normal range of motion present. No thyroid mass and no thyromegaly present.   Cardiovascular: Normal rate and regular rhythm.   Pulmonary/Chest: Effort normal. No accessory muscle usage. No tachypnea. No respiratory distress.   Abdominal: Soft. Normal appearance and bowel sounds are normal. She exhibits no distension and no mass. There is no hepatosplenomegaly. There is tenderness in the left lower quadrant. There is no tenderness at McBurney's point and negative Broussard's sign. No hernia.   Lymphadenopathy:     She has no cervical adenopathy.     She has no axillary adenopathy.        Right: No inguinal adenopathy present.        Left: No inguinal adenopathy present.   Neurological: She is alert. Coordination and gait normal.   Skin: Skin is warm and intact.   Psychiatric: She has a normal mood and affect. Her speech is normal and behavior is normal.       Assessment/Plan:   Diverticulitis of large  intestine without perforation or abscess without bleeding  -     CT Abdomen Pelvis W Wo Contrast; Future; Expected date: 10/08/2019  -     Full code; Standing  -     Insert peripheral IV; Standing  -     Comprehensive metabolic panel; Future; Expected date: 09/30/2019  -     CBC auto differential; Future; Expected date: 09/30/2019  -     EKG 12-lead; Future  -     X-Ray Chest 1 View; Future; Expected date: 09/30/2019  -     Case Request Operating Room: COLECTOMY, SIGMOID, LAPAROSCOPIC    Other orders  -     lidocaine (PF) 10 mg/ml (1%) injection 10 mg  -     ceFOXItin (MEFOXIN) 2 g in dextrose 5 % 100 mL IVPB        Planned procedure:  Laparoscopic-assisted sigmoid colectomy possible open    Extensive discussion about surgical treatment, recovery, possible complications held with patient and her .    No follow-ups on file.

## 2019-10-01 ENCOUNTER — TELEPHONE (OUTPATIENT)
Dept: FAMILY MEDICINE | Facility: CLINIC | Age: 54
End: 2019-10-01

## 2019-10-01 DIAGNOSIS — Z11.59 NEED FOR HEPATITIS C SCREENING TEST: Primary | ICD-10-CM

## 2019-10-01 NOTE — TELEPHONE ENCOUNTER
The lab did a mix match with . And Mr. Lawson, they want to redo the Hep-C for both at no charge. They will need a new order. I have pended the lab in this encounter.

## 2019-10-07 ENCOUNTER — PATIENT MESSAGE (OUTPATIENT)
Dept: FAMILY MEDICINE | Facility: CLINIC | Age: 54
End: 2019-10-07

## 2019-10-08 ENCOUNTER — HOSPITAL ENCOUNTER (OUTPATIENT)
Dept: PREADMISSION TESTING | Facility: HOSPITAL | Age: 54
Discharge: HOME | End: 2019-10-08
Attending: SURGERY
Payer: COMMERCIAL

## 2019-10-08 ENCOUNTER — HOSPITAL ENCOUNTER (OUTPATIENT)
Dept: RADIOLOGY | Facility: HOSPITAL | Age: 54
Discharge: HOME | End: 2019-10-08
Attending: SURGERY
Payer: COMMERCIAL

## 2019-10-08 ENCOUNTER — LAB VISIT (OUTPATIENT)
Dept: LAB | Facility: HOSPITAL | Age: 54
End: 2019-10-08
Attending: SURGERY
Payer: COMMERCIAL

## 2019-10-08 VITALS
TEMPERATURE: 98 F | HEIGHT: 64 IN | HEART RATE: 72 BPM | OXYGEN SATURATION: 97 % | DIASTOLIC BLOOD PRESSURE: 87 MMHG | BODY MASS INDEX: 32.56 KG/M2 | RESPIRATION RATE: 18 BRPM | WEIGHT: 190.69 LBS | SYSTOLIC BLOOD PRESSURE: 131 MMHG

## 2019-10-08 DIAGNOSIS — K57.32 DIVERTICULITIS OF LARGE INTESTINE WITHOUT PERFORATION OR ABSCESS WITHOUT BLEEDING: ICD-10-CM

## 2019-10-08 LAB
ALBUMIN SERPL BCP-MCNC: 4 G/DL (ref 3.5–5.2)
ALP SERPL-CCNC: 43 U/L (ref 55–135)
ALT SERPL W/O P-5'-P-CCNC: 26 U/L (ref 10–44)
ANION GAP SERPL CALC-SCNC: 8 MMOL/L (ref 8–16)
AST SERPL-CCNC: 44 U/L (ref 10–40)
BASOPHILS # BLD AUTO: 0.02 K/UL (ref 0–0.2)
BASOPHILS NFR BLD: 0.4 % (ref 0–1.9)
BILIRUB SERPL-MCNC: 0.6 MG/DL (ref 0.1–1)
BUN SERPL-MCNC: 11 MG/DL (ref 6–20)
CALCIUM SERPL-MCNC: 9 MG/DL (ref 8.7–10.5)
CHLORIDE SERPL-SCNC: 103 MMOL/L (ref 95–110)
CO2 SERPL-SCNC: 28 MMOL/L (ref 23–29)
CREAT SERPL-MCNC: 0.8 MG/DL (ref 0.5–1.4)
DIFFERENTIAL METHOD: ABNORMAL
EOSINOPHIL # BLD AUTO: 0.1 K/UL (ref 0–0.5)
EOSINOPHIL NFR BLD: 2 % (ref 0–8)
ERYTHROCYTE [DISTWIDTH] IN BLOOD BY AUTOMATED COUNT: 12.1 % (ref 11.5–14.5)
EST. GFR  (AFRICAN AMERICAN): >60 ML/MIN/1.73 M^2
EST. GFR  (NON AFRICAN AMERICAN): >60 ML/MIN/1.73 M^2
GLUCOSE SERPL-MCNC: 96 MG/DL (ref 70–110)
HCT VFR BLD AUTO: 36.8 % (ref 37–48.5)
HGB BLD-MCNC: 12.8 G/DL (ref 12–16)
IMM GRANULOCYTES # BLD AUTO: 0.03 K/UL (ref 0–0.04)
IMM GRANULOCYTES NFR BLD AUTO: 0.6 % (ref 0–0.5)
LYMPHOCYTES # BLD AUTO: 1.4 K/UL (ref 1–4.8)
LYMPHOCYTES NFR BLD: 28.1 % (ref 18–48)
MCH RBC QN AUTO: 33.2 PG (ref 27–31)
MCHC RBC AUTO-ENTMCNC: 34.8 G/DL (ref 32–36)
MCV RBC AUTO: 95 FL (ref 82–98)
MONOCYTES # BLD AUTO: 0.3 K/UL (ref 0.3–1)
MONOCYTES NFR BLD: 5.7 % (ref 4–15)
NEUTROPHILS # BLD AUTO: 3.1 K/UL (ref 1.8–7.7)
NEUTROPHILS NFR BLD: 63.2 % (ref 38–73)
NRBC BLD-RTO: 0 /100 WBC
PLATELET # BLD AUTO: 276 K/UL (ref 150–350)
PMV BLD AUTO: 8.8 FL (ref 9.2–12.9)
POTASSIUM SERPL-SCNC: 4.1 MMOL/L (ref 3.5–5.1)
PROT SERPL-MCNC: 6.6 G/DL (ref 6–8.4)
RBC # BLD AUTO: 3.86 M/UL (ref 4–5.4)
SODIUM SERPL-SCNC: 139 MMOL/L (ref 136–145)
WBC # BLD AUTO: 4.88 K/UL (ref 3.9–12.7)

## 2019-10-08 PROCEDURE — 36415 COLL VENOUS BLD VENIPUNCTURE: CPT

## 2019-10-08 PROCEDURE — 80053 COMPREHEN METABOLIC PANEL: CPT

## 2019-10-08 PROCEDURE — 85025 COMPLETE CBC W/AUTO DIFF WBC: CPT

## 2019-10-08 PROCEDURE — 25500020 PHARM REV CODE 255: Performed by: SURGERY

## 2019-10-08 PROCEDURE — 74178 CT ABD&PLV WO CNTR FLWD CNTR: CPT | Mod: TC

## 2019-10-08 PROCEDURE — 93005 ELECTROCARDIOGRAM TRACING: CPT

## 2019-10-08 PROCEDURE — 71046 X-RAY EXAM CHEST 2 VIEWS: CPT | Mod: TC

## 2019-10-08 RX ADMIN — IOHEXOL 100 ML: 350 INJECTION, SOLUTION INTRAVENOUS at 09:10

## 2019-10-08 NOTE — DISCHARGE INSTRUCTIONS
To confirm, Your doctor has instructed you that surgery is scheduled for:   October 16, 2019 (Wednesday)    Please report to [facility] on [address] the morning of surgery.     Pre-Op will call the afternoon prior to surgery between 4:00 and 6:00 PM with the final arrival time.    October 15, 2019 ( Tuesday)    PLEASE NOTE:  The surgery schedule has many variables which may affect the time of your surgery case.  Family members should be available if your surgery time changes.  Plan to be here the day of your procedure between 4-6 hours.    MEDICATIONS:  TAKE ONLY THESE MEDICATIONS WITH A SMALL SIP OF WATER THE MORNING OF YOUR PROCEDURE:  Norvasc/Bystolic/Inhalers      DO NOT TAKE THESE MEDICATIONS 5-7 DAYS PRIOR to your procedure or per your surgeon's request: ASPIRIN, ALEVE, ADVIL, IBUPROFEN, FISH OIL VITAMIN E, HERBALS  (May take Tylenol)      INSTRUCTIONS IMPORTANT!!  · Do not eat or drink anything between midnight and the time of your procedure- this includes gum, mints, and candy.  · Do not smoke, vape or drink alcoholic beverages 24 hours prior to your procedure.  · Shower the night before AND the morning of your procedure with a Chlorhexidine wash such as Hibiclens or Dial antibacterial soap from the neck down.  Do not get it on your face or in your eyes.  You may use your own shampoo and face wash. This helps your skin to be as bacteria free as possible.    · If you wear contact lenses, dentures, hearing aids or glasses, bring a container to put them in during surgery and give to a family member for safe keeping.   · Please leave all jewelry, piercing's and valuables at home.   · DO NOT remove hair from the surgery site.  Do not shave the incision site unless you are given specific instructions to do so.    · ONLY for patients requiring bowel prep, written instructions will be given by your doctor's office.  · If your doctor has scheduled you for an overnight stay, bring a small overnight bag with  any personal items you need.  · Make arrangements in advance for transportation home by a responsible adult.  · You must make arrangements for transportation, TAXI'S, UBER'S OR LYFTS ARE NOT ALLOWED.          If you have any questions about these instructions, call Pre-Op Admit  Nursing at [phone] or the Pre-Op Day Surgery Unit at [phone].

## 2019-10-16 ENCOUNTER — HOSPITAL ENCOUNTER (INPATIENT)
Facility: HOSPITAL | Age: 54
LOS: 5 days | Discharge: HOME | DRG: 331 | End: 2019-10-21
Attending: SURGERY | Admitting: SURGERY
Payer: COMMERCIAL

## 2019-10-16 ENCOUNTER — ANESTHESIA EVENT (OUTPATIENT)
Dept: SURGERY | Facility: HOSPITAL | Age: 54
DRG: 331 | End: 2019-10-16
Payer: COMMERCIAL

## 2019-10-16 ENCOUNTER — ANESTHESIA (OUTPATIENT)
Dept: SURGERY | Facility: HOSPITAL | Age: 54
DRG: 331 | End: 2019-10-16
Payer: COMMERCIAL

## 2019-10-16 DIAGNOSIS — K57.32 DIVERTICULITIS OF LARGE INTESTINE WITHOUT PERFORATION OR ABSCESS WITHOUT BLEEDING: ICD-10-CM

## 2019-10-16 PROBLEM — J45.909 ASTHMA: Status: ACTIVE | Noted: 2019-10-16

## 2019-10-16 PROCEDURE — 27201423 OPTIME MED/SURG SUP & DEVICES STERILE SUPPLY: Performed by: SURGERY

## 2019-10-16 PROCEDURE — 25000003 PHARM REV CODE 250: Performed by: SPECIALIST

## 2019-10-16 PROCEDURE — C1769 GUIDE WIRE: HCPCS | Performed by: SURGERY

## 2019-10-16 PROCEDURE — 36000710: Performed by: SURGERY

## 2019-10-16 PROCEDURE — 94770 HC EXHALED C02 TEST: CPT

## 2019-10-16 PROCEDURE — 63600175 PHARM REV CODE 636 W HCPCS: Performed by: NURSE ANESTHETIST, CERTIFIED REGISTERED

## 2019-10-16 PROCEDURE — 27000221 HC OXYGEN, UP TO 24 HOURS

## 2019-10-16 PROCEDURE — 94799 UNLISTED PULMONARY SVC/PX: CPT

## 2019-10-16 PROCEDURE — 94761 N-INVAS EAR/PLS OXIMETRY MLT: CPT

## 2019-10-16 PROCEDURE — 63600175 PHARM REV CODE 636 W HCPCS: Performed by: SURGERY

## 2019-10-16 PROCEDURE — 25000003 PHARM REV CODE 250: Performed by: SURGERY

## 2019-10-16 PROCEDURE — 71000039 HC RECOVERY, EACH ADD'L HOUR: Performed by: SURGERY

## 2019-10-16 PROCEDURE — 27000080 OPTIME MED/SURG SUP & DEVICES GENERAL CLASSIFICATION: Performed by: SURGERY

## 2019-10-16 PROCEDURE — 63600175 PHARM REV CODE 636 W HCPCS: Performed by: ANESTHESIOLOGY

## 2019-10-16 PROCEDURE — 71000033 HC RECOVERY, INTIAL HOUR: Performed by: SURGERY

## 2019-10-16 PROCEDURE — 37000009 HC ANESTHESIA EA ADD 15 MINS: Performed by: SURGERY

## 2019-10-16 PROCEDURE — S0028 INJECTION, FAMOTIDINE, 20 MG: HCPCS | Performed by: NURSE ANESTHETIST, CERTIFIED REGISTERED

## 2019-10-16 PROCEDURE — 44140 PR PART REMOVAL COLON W ANASTOMOSIS: ICD-10-PCS | Mod: ,,, | Performed by: SURGERY

## 2019-10-16 PROCEDURE — 44140 PARTIAL REMOVAL OF COLON: CPT | Mod: ,,, | Performed by: SURGERY

## 2019-10-16 PROCEDURE — 36000711: Performed by: SURGERY

## 2019-10-16 PROCEDURE — 37000008 HC ANESTHESIA 1ST 15 MINUTES: Performed by: SURGERY

## 2019-10-16 PROCEDURE — 12000002 HC ACUTE/MED SURGE SEMI-PRIVATE ROOM

## 2019-10-16 PROCEDURE — 99900035 HC TECH TIME PER 15 MIN (STAT)

## 2019-10-16 PROCEDURE — 25000003 PHARM REV CODE 250: Performed by: ANESTHESIOLOGY

## 2019-10-16 PROCEDURE — 25000003 PHARM REV CODE 250: Performed by: NURSE ANESTHETIST, CERTIFIED REGISTERED

## 2019-10-16 RX ORDER — SODIUM CHLORIDE, SODIUM LACTATE, POTASSIUM CHLORIDE, CALCIUM CHLORIDE 600; 310; 30; 20 MG/100ML; MG/100ML; MG/100ML; MG/100ML
INJECTION, SOLUTION INTRAVENOUS CONTINUOUS PRN
Status: DISCONTINUED | OUTPATIENT
Start: 2019-10-16 | End: 2019-10-16

## 2019-10-16 RX ORDER — ACETAMINOPHEN 500 MG
1000 TABLET ORAL ONCE
Status: DISCONTINUED | OUTPATIENT
Start: 2019-10-16 | End: 2019-10-21 | Stop reason: HOSPADM

## 2019-10-16 RX ORDER — SODIUM CHLORIDE 0.9 % (FLUSH) 0.9 %
10 SYRINGE (ML) INJECTION
Status: DISCONTINUED | OUTPATIENT
Start: 2019-10-16 | End: 2019-10-21 | Stop reason: HOSPADM

## 2019-10-16 RX ORDER — SUCCINYLCHOLINE CHLORIDE 20 MG/ML
INJECTION INTRAMUSCULAR; INTRAVENOUS
Status: DISCONTINUED | OUTPATIENT
Start: 2019-10-16 | End: 2019-10-16

## 2019-10-16 RX ORDER — MORPHINE SULFATE 1 MG/ML
INJECTION INTRAVENOUS CONTINUOUS
Status: DISCONTINUED | OUTPATIENT
Start: 2019-10-16 | End: 2019-10-19

## 2019-10-16 RX ORDER — CEFAZOLIN SODIUM 2 G/50ML
2 SOLUTION INTRAVENOUS
Status: DISCONTINUED | OUTPATIENT
Start: 2019-10-16 | End: 2019-10-16

## 2019-10-16 RX ORDER — BUPIVACAINE HYDROCHLORIDE AND EPINEPHRINE 5; 5 MG/ML; UG/ML
INJECTION, SOLUTION EPIDURAL; INTRACAUDAL; PERINEURAL
Status: DISCONTINUED | OUTPATIENT
Start: 2019-10-16 | End: 2019-10-16 | Stop reason: HOSPADM

## 2019-10-16 RX ORDER — ONDANSETRON 2 MG/ML
INJECTION INTRAMUSCULAR; INTRAVENOUS
Status: DISCONTINUED | OUTPATIENT
Start: 2019-10-16 | End: 2019-10-16

## 2019-10-16 RX ORDER — AMLODIPINE BESYLATE 2.5 MG/1
2.5 TABLET ORAL DAILY
Status: DISCONTINUED | OUTPATIENT
Start: 2019-10-17 | End: 2019-10-21 | Stop reason: HOSPADM

## 2019-10-16 RX ORDER — DIPHENHYDRAMINE HYDROCHLORIDE 50 MG/ML
12.5 INJECTION INTRAMUSCULAR; INTRAVENOUS
Status: DISCONTINUED | OUTPATIENT
Start: 2019-10-16 | End: 2019-10-16

## 2019-10-16 RX ORDER — DEXAMETHASONE SODIUM PHOSPHATE 4 MG/ML
INJECTION, SOLUTION INTRA-ARTICULAR; INTRALESIONAL; INTRAMUSCULAR; INTRAVENOUS; SOFT TISSUE
Status: DISCONTINUED | OUTPATIENT
Start: 2019-10-16 | End: 2019-10-16

## 2019-10-16 RX ORDER — NEBIVOLOL 5 MG/1
5 TABLET ORAL DAILY
Status: DISCONTINUED | OUTPATIENT
Start: 2019-10-16 | End: 2019-10-21 | Stop reason: HOSPADM

## 2019-10-16 RX ORDER — LIDOCAINE HYDROCHLORIDE 40 MG/ML
SOLUTION TOPICAL
Status: DISCONTINUED | OUTPATIENT
Start: 2019-10-16 | End: 2019-10-16

## 2019-10-16 RX ORDER — NALOXONE HCL 0.4 MG/ML
0.02 VIAL (ML) INJECTION ONCE AS NEEDED
Status: DISCONTINUED | OUTPATIENT
Start: 2019-10-16 | End: 2019-10-21 | Stop reason: HOSPADM

## 2019-10-16 RX ORDER — DIPHENHYDRAMINE HYDROCHLORIDE 50 MG/ML
INJECTION INTRAMUSCULAR; INTRAVENOUS
Status: DISCONTINUED | OUTPATIENT
Start: 2019-10-16 | End: 2019-10-16

## 2019-10-16 RX ORDER — GABAPENTIN 300 MG/1
300 CAPSULE ORAL ONCE
Status: COMPLETED | OUTPATIENT
Start: 2019-10-16 | End: 2019-10-16

## 2019-10-16 RX ORDER — FAMOTIDINE 10 MG/ML
INJECTION INTRAVENOUS
Status: DISCONTINUED | OUTPATIENT
Start: 2019-10-16 | End: 2019-10-16

## 2019-10-16 RX ORDER — ROCURONIUM BROMIDE 10 MG/ML
INJECTION, SOLUTION INTRAVENOUS
Status: DISCONTINUED | OUTPATIENT
Start: 2019-10-16 | End: 2019-10-16

## 2019-10-16 RX ORDER — CETIRIZINE HYDROCHLORIDE 10 MG/1
10 TABLET, CHEWABLE ORAL DAILY
COMMUNITY

## 2019-10-16 RX ORDER — HYDROCODONE BITARTRATE AND ACETAMINOPHEN 5; 325 MG/1; MG/1
2 TABLET ORAL EVERY 4 HOURS PRN
Status: DISCONTINUED | OUTPATIENT
Start: 2019-10-16 | End: 2019-10-21 | Stop reason: HOSPADM

## 2019-10-16 RX ORDER — ACETAMINOPHEN 10 MG/ML
INJECTION, SOLUTION INTRAVENOUS
Status: DISCONTINUED | OUTPATIENT
Start: 2019-10-16 | End: 2019-10-16

## 2019-10-16 RX ORDER — SODIUM CHLORIDE 9 MG/ML
INJECTION, SOLUTION INTRAVENOUS CONTINUOUS
Status: DISCONTINUED | OUTPATIENT
Start: 2019-10-16 | End: 2019-10-21 | Stop reason: HOSPADM

## 2019-10-16 RX ORDER — MORPHINE SULFATE 1 MG/ML
INJECTION INTRAVENOUS CONTINUOUS
Status: DISCONTINUED | OUTPATIENT
Start: 2019-10-16 | End: 2019-10-16

## 2019-10-16 RX ORDER — ONDANSETRON 2 MG/ML
4 INJECTION INTRAMUSCULAR; INTRAVENOUS DAILY PRN
Status: DISCONTINUED | OUTPATIENT
Start: 2019-10-16 | End: 2019-10-16

## 2019-10-16 RX ORDER — PANTOPRAZOLE SODIUM 40 MG/10ML
40 INJECTION, POWDER, LYOPHILIZED, FOR SOLUTION INTRAVENOUS
Status: DISCONTINUED | OUTPATIENT
Start: 2019-10-17 | End: 2019-10-20

## 2019-10-16 RX ORDER — PROPOFOL 10 MG/ML
VIAL (ML) INTRAVENOUS
Status: DISCONTINUED | OUTPATIENT
Start: 2019-10-16 | End: 2019-10-16

## 2019-10-16 RX ORDER — SODIUM CHLORIDE 9 MG/ML
INJECTION, SOLUTION INTRAVENOUS CONTINUOUS
Status: DISCONTINUED | OUTPATIENT
Start: 2019-10-16 | End: 2019-10-20

## 2019-10-16 RX ORDER — DIPHENHYDRAMINE HYDROCHLORIDE 50 MG/ML
12.5 INJECTION INTRAMUSCULAR; INTRAVENOUS EVERY 4 HOURS PRN
Status: DISCONTINUED | OUTPATIENT
Start: 2019-10-16 | End: 2019-10-20

## 2019-10-16 RX ORDER — LIDOCAINE HYDROCHLORIDE 20 MG/ML
INJECTION, SOLUTION EPIDURAL; INFILTRATION; INTRACAUDAL; PERINEURAL
Status: DISCONTINUED | OUTPATIENT
Start: 2019-10-16 | End: 2019-10-16

## 2019-10-16 RX ORDER — MUPIROCIN 20 MG/G
1 OINTMENT TOPICAL 2 TIMES DAILY
Status: COMPLETED | OUTPATIENT
Start: 2019-10-16 | End: 2019-10-21

## 2019-10-16 RX ORDER — MIDAZOLAM HYDROCHLORIDE 1 MG/ML
INJECTION INTRAMUSCULAR; INTRAVENOUS
Status: DISCONTINUED | OUTPATIENT
Start: 2019-10-16 | End: 2019-10-16

## 2019-10-16 RX ORDER — MORPHINE SULFATE 10 MG/ML
2 INJECTION INTRAMUSCULAR; INTRAVENOUS; SUBCUTANEOUS
Status: DISCONTINUED | OUTPATIENT
Start: 2019-10-16 | End: 2019-10-16 | Stop reason: HOSPADM

## 2019-10-16 RX ORDER — ONDANSETRON 2 MG/ML
4 INJECTION INTRAMUSCULAR; INTRAVENOUS EVERY 6 HOURS PRN
Status: DISCONTINUED | OUTPATIENT
Start: 2019-10-16 | End: 2019-10-21 | Stop reason: HOSPADM

## 2019-10-16 RX ORDER — FENTANYL CITRATE 50 UG/ML
INJECTION, SOLUTION INTRAMUSCULAR; INTRAVENOUS
Status: DISCONTINUED | OUTPATIENT
Start: 2019-10-16 | End: 2019-10-16

## 2019-10-16 RX ORDER — ENOXAPARIN SODIUM 100 MG/ML
40 INJECTION SUBCUTANEOUS EVERY 24 HOURS
Status: DISCONTINUED | OUTPATIENT
Start: 2019-10-17 | End: 2019-10-21 | Stop reason: HOSPADM

## 2019-10-16 RX ORDER — HYDRALAZINE HYDROCHLORIDE 20 MG/ML
10 INJECTION INTRAMUSCULAR; INTRAVENOUS EVERY 6 HOURS PRN
Status: DISCONTINUED | OUTPATIENT
Start: 2019-10-16 | End: 2019-10-21 | Stop reason: HOSPADM

## 2019-10-16 RX ORDER — CEFOXITIN SODIUM 2 G/50ML
INJECTION, SOLUTION INTRAVENOUS
Status: DISCONTINUED | OUTPATIENT
Start: 2019-10-16 | End: 2019-10-16

## 2019-10-16 RX ORDER — KETAMINE HYDROCHLORIDE 50 MG/ML
INJECTION, SOLUTION INTRAMUSCULAR; INTRAVENOUS
Status: DISCONTINUED | OUTPATIENT
Start: 2019-10-16 | End: 2019-10-16

## 2019-10-16 RX ORDER — LIDOCAINE HYDROCHLORIDE 20 MG/ML
JELLY TOPICAL
Status: DISCONTINUED | OUTPATIENT
Start: 2019-10-16 | End: 2019-10-16 | Stop reason: HOSPADM

## 2019-10-16 RX ORDER — HYDROMORPHONE HYDROCHLORIDE 1 MG/ML
0.2 INJECTION, SOLUTION INTRAMUSCULAR; INTRAVENOUS; SUBCUTANEOUS
Status: DISCONTINUED | OUTPATIENT
Start: 2019-10-16 | End: 2019-10-16

## 2019-10-16 RX ORDER — CEFOXITIN SODIUM 2 G/50ML
2 INJECTION, SOLUTION INTRAVENOUS
Status: COMPLETED | OUTPATIENT
Start: 2019-10-16 | End: 2019-10-16

## 2019-10-16 RX ADMIN — MIDAZOLAM HYDROCHLORIDE 2 MG: 1 INJECTION, SOLUTION INTRAMUSCULAR; INTRAVENOUS at 08:10

## 2019-10-16 RX ADMIN — MORPHINE SULFATE: 1 INJECTION INTRAVENOUS at 12:10

## 2019-10-16 RX ADMIN — SODIUM CHLORIDE, SODIUM LACTATE, POTASSIUM CHLORIDE, AND CALCIUM CHLORIDE: .6; .31; .03; .02 INJECTION, SOLUTION INTRAVENOUS at 09:10

## 2019-10-16 RX ADMIN — SODIUM CHLORIDE: 0.9 INJECTION, SOLUTION INTRAVENOUS at 02:10

## 2019-10-16 RX ADMIN — FENTANYL CITRATE 50 MCG: 50 INJECTION INTRAMUSCULAR; INTRAVENOUS at 09:10

## 2019-10-16 RX ADMIN — LIDOCAINE HYDROCHLORIDE 4 ML: 40 SOLUTION TOPICAL at 08:10

## 2019-10-16 RX ADMIN — ACETAMINOPHEN 1000 MG: 10 INJECTION, SOLUTION INTRAVENOUS at 08:10

## 2019-10-16 RX ADMIN — ROCURONIUM BROMIDE 10 MG: 10 INJECTION, SOLUTION INTRAVENOUS at 09:10

## 2019-10-16 RX ADMIN — CEFOXITIN SODIUM 2 G: 2 INJECTION, SOLUTION INTRAVENOUS at 04:10

## 2019-10-16 RX ADMIN — DIPHENHYDRAMINE HYDROCHLORIDE 12.5 MG: 50 INJECTION, SOLUTION INTRAMUSCULAR; INTRAVENOUS at 04:10

## 2019-10-16 RX ADMIN — PROPOFOL 120 MG: 10 INJECTION, EMULSION INTRAVENOUS at 08:10

## 2019-10-16 RX ADMIN — SODIUM CHLORIDE, SODIUM LACTATE, POTASSIUM CHLORIDE, AND CALCIUM CHLORIDE: .6; .31; .03; .02 INJECTION, SOLUTION INTRAVENOUS at 10:10

## 2019-10-16 RX ADMIN — ROCURONIUM BROMIDE 20 MG: 10 INJECTION, SOLUTION INTRAVENOUS at 08:10

## 2019-10-16 RX ADMIN — SODIUM CHLORIDE: 0.9 INJECTION, SOLUTION INTRAVENOUS at 06:10

## 2019-10-16 RX ADMIN — FENTANYL CITRATE 50 MCG: 50 INJECTION INTRAMUSCULAR; INTRAVENOUS at 08:10

## 2019-10-16 RX ADMIN — MUPIROCIN 1 G: 20 OINTMENT TOPICAL at 09:10

## 2019-10-16 RX ADMIN — SUGAMMADEX 200 MG: 100 INJECTION, SOLUTION INTRAVENOUS at 10:10

## 2019-10-16 RX ADMIN — KETAMINE HYDROCHLORIDE 25 MG: 50 INJECTION INTRAMUSCULAR; INTRAVENOUS at 09:10

## 2019-10-16 RX ADMIN — ONDANSETRON 4 MG: 2 INJECTION INTRAMUSCULAR; INTRAVENOUS at 12:10

## 2019-10-16 RX ADMIN — MORPHINE SULFATE: 30 INJECTION, SOLUTION INTRAVENOUS; SUBCUTANEOUS at 04:10

## 2019-10-16 RX ADMIN — GABAPENTIN 300 MG: 300 CAPSULE ORAL at 07:10

## 2019-10-16 RX ADMIN — MORPHINE SULFATE 2 MG: 10 INJECTION INTRAVENOUS at 11:10

## 2019-10-16 RX ADMIN — SODIUM CHLORIDE, SODIUM LACTATE, POTASSIUM CHLORIDE, AND CALCIUM CHLORIDE: .6; .31; .03; .02 INJECTION, SOLUTION INTRAVENOUS at 07:10

## 2019-10-16 RX ADMIN — MORPHINE SULFATE 2 MG: 10 INJECTION INTRAVENOUS at 12:10

## 2019-10-16 RX ADMIN — LIDOCAINE HYDROCHLORIDE 100 MG: 20 INJECTION, SOLUTION EPIDURAL; INFILTRATION; INTRACAUDAL; PERINEURAL at 08:10

## 2019-10-16 RX ADMIN — SUCCINYLCHOLINE CHLORIDE 120 MG: 20 INJECTION, SOLUTION INTRAMUSCULAR; INTRAVENOUS at 08:10

## 2019-10-16 RX ADMIN — DIPHENHYDRAMINE HYDROCHLORIDE 12.5 MG: 50 INJECTION, SOLUTION INTRAMUSCULAR; INTRAVENOUS at 08:10

## 2019-10-16 RX ADMIN — DEXAMETHASONE SODIUM PHOSPHATE 8 MG: 4 INJECTION, SOLUTION INTRAMUSCULAR; INTRAVENOUS at 08:10

## 2019-10-16 RX ADMIN — CEFOXITIN SODIUM 2 G: 2 INJECTION, SOLUTION INTRAVENOUS at 08:10

## 2019-10-16 RX ADMIN — FAMOTIDINE 20 MG: 10 INJECTION, SOLUTION INTRAVENOUS at 08:10

## 2019-10-16 RX ADMIN — VANCOMYCIN HYDROCHLORIDE 1500 MG: 1 INJECTION, POWDER, LYOPHILIZED, FOR SOLUTION INTRAVENOUS at 06:10

## 2019-10-16 RX ADMIN — ROCURONIUM BROMIDE 10 MG: 10 INJECTION, SOLUTION INTRAVENOUS at 08:10

## 2019-10-16 RX ADMIN — ONDANSETRON 4 MG: 2 INJECTION INTRAMUSCULAR; INTRAVENOUS at 10:10

## 2019-10-16 RX ADMIN — CEFOXITIN SODIUM 2 G: 2 INJECTION, SOLUTION INTRAVENOUS at 11:10

## 2019-10-16 RX ADMIN — PROPOFOL 30 MG: 10 INJECTION, EMULSION INTRAVENOUS at 08:10

## 2019-10-16 NOTE — ANESTHESIA POSTPROCEDURE EVALUATION
Anesthesia Post Evaluation    Patient: Daniela Lawson    Procedure(s) Performed: Procedure(s) (LRB):  COLECTOMY, SIGMOID, LAPAROSCOPIC (N/A)  INSERTION, STENT, URETER (N/A)    Final Anesthesia Type: general  Patient location during evaluation: PACU  Patient participation: Yes- Able to Participate  Level of consciousness: awake and alert and oriented  Post-procedure vital signs: reviewed and stable  Pain management: adequate  Airway patency: patent  PONV status at discharge: No PONV  Anesthetic complications: no      Cardiovascular status: blood pressure returned to baseline and hemodynamically stable  Respiratory status: unassisted, spontaneous ventilation and room air  Hydration status: euvolemic  Follow-up not needed.          Vitals Value Taken Time   /87 10/16/2019 12:45 PM   Temp 36.3 °C (97.4 °F) 10/16/2019 12:00 PM   Pulse 71 10/16/2019 12:49 PM   Resp 16 10/16/2019 12:49 PM   SpO2 97 % 10/16/2019 12:49 PM   Vitals shown include unvalidated device data.      No case tracking events are documented in the log.      Pain/Patti Score: Pain Rating Prior to Med Admin: 5 (10/16/2019 12:28 PM)  Patti Score: 8 (10/16/2019 12:00 PM)

## 2019-10-16 NOTE — TRANSFER OF CARE
"Anesthesia Transfer of Care Note    Patient: Daniela Lawson    Procedure(s) Performed: Procedure(s) (LRB):  COLECTOMY, SIGMOID, LAPAROSCOPIC (N/A)  INSERTION, STENT, URETER (N/A)    Patient location: PACU    Anesthesia Type: general    Transport from OR: Transported from OR on 2-3 L/min O2 by NC with adequate spontaneous ventilation    Post pain: adequate analgesia    Post assessment: no apparent anesthetic complications    Post vital signs: stable    Level of consciousness: sedated, oriented and responds to stimulation    Nausea/Vomiting: no nausea/vomiting    Complications: none    Transfer of care protocol was followed      Last vitals:   Visit Vitals  BP (!) 163/92 (BP Location: Left arm, Patient Position: Lying)   Pulse 73   Temp 36.7 °C (98.1 °F) (Temporal)   Resp 14   Ht 5' 4" (1.626 m)   Wt 86.5 kg (190 lb 11.2 oz)   SpO2 100%   Breastfeeding? No   BMI 32.73 kg/m²     "

## 2019-10-16 NOTE — INTERVAL H&P NOTE
The patient has been examined and the H&P has been reviewed:    I concur with the findings and no changes have occurred since H&P was written.    Anesthesia/Surgery risks, benefits and alternative options discussed and understood by patient/family.          Active Hospital Problems    Diagnosis  POA    Diverticulitis of large intestine without perforation or abscess without bleeding [K57.32]  Yes      Resolved Hospital Problems   No resolved problems to display.

## 2019-10-16 NOTE — PLAN OF CARE
10/16/19 1627   Patient Assessment/Suction   Level of Consciousness (AVPU) alert   PRE-TX-O2   O2 Device (Oxygen Therapy) nasal cannula   $ Is the patient on Low Flow Oxygen? Yes   Flow (L/min) 2   SpO2 97 %   Pulse Oximetry Type Intermittent   $ Pulse Oximetry - Multiple Charge Pulse Oximetry - Multiple   Pulse 82   Resp 15   Incentive Spirometer   $ Incentive Spirometer Charges done with encouragement;done independently per patient   Administration (IS) instruction provided, initial   Number of Repetitions (IS) 10   Level Incentive Spirometer (mL) 1200

## 2019-10-16 NOTE — OP NOTE
Operative Note         SUMMARY     Surgery Date:  10/16/2019     Assistant:     Indications:  54-year-old female for laparoscopic colon surgery for diverticulitis  Have been asked to place urinary stent      Pre-op Diagnosis:   Diverticulitis    Post-op Diagnosis:  Same    Procedure:  Cystoscopy with bilateral stent placement    Anesthesia: General    Description of Procedure:   Patient brought to cystoscopy suite.  Placed in the cystoscopy position.  Patient is prepped and draped.  Anesthesia is given.  We enter the urinary bladder with the 21 fr cystoscope.  No lesions are found within the bladder  Were able to place a Glidewire into either ureter  Once the wires are in place with place stents into the renal pelvis is bilateral  The stents in good position we placed a Sanders catheter and secured the catheter to the stents  Case was turned back over to General surgery    Findings/Key Components:    Sanders catheter and stents can be removed as needed      Estimated Blood Loss:      None

## 2019-10-16 NOTE — BRIEF OP NOTE
[facility]  Brief Operative Note    SUMMARY     Surgery Date: 10/16/2019     Surgeon(s) and Role:  Panel 1:     * Doug Gifford MD - Primary     * Snehal Galvez MD - Assisting  Panel 2:     * Willard Ruffin MD - Primary        Pre-op Diagnosis:  Diverticulitis of large intestine without perforation or abscess without bleeding [K57.32]    Post-op Diagnosis:  Post-Op Diagnosis Codes:     * Diverticulitis of large intestine without perforation or abscess without bleeding [K57.32]    Procedure(s) (LRB):  COLECTOMY, SIGMOID, LAPAROSCOPIC (N/A)  INSERTION, STENT, URETER (N/A)    Anesthesia: General    Description of Procedure:  See dictation    Description of the findings of the procedure:  See dictation    Estimated Blood Loss: * 100 cc       Specimens:   Specimen (12h ago, onward)     Start     Ordered    10/16/19 1011  Specimen to Pathology - Surgery  Once     Comments:  Pre-op Diagnosis: Diverticulitis of large intestine without perforation or abscess without bleeding [K57.32]Procedure(s):COLECTOMY, SIGMOID, LAPAROSCOPICINSERTION, STENT, URETER Number of specimens:1Name of specimens: sigmoid colon      10/16/19 1039

## 2019-10-16 NOTE — OP NOTE
Date of service 10/16/2019    Preoperative diagnosis:  Recurrent acute diverticulitis    Postoperative diagnosis:  Same    Procedure:  Laparoscopic-assisted sigmoid colectomy with low pelvic anastomosis.    Description technical procedure and findings:    Patient was transported to the operating room. Ureteral stents were placed by a urologist.  Refer to his dictation for details.  Patient was then placed in dorsal lithotomy position. Prepped and draped in usual sterile manner. Vertical midline laparotomy incision the size of my left hand was made just above the umbilicus. Entrance into peritoneal cavity was obtained without incident.  Lap disc device was placed in usual fashion. Pneumoperitoneum was that she was CO2 gas. 2 12 mm ports were placed in the abdomen under direct laparoscopic guidance.    Patient's abdomen was examined both visually and with palpation of my left hand.  Distal descending colon and proximal sigmoid colon showed changes consistent with recurrent diverticulitis.  There was some resolving inflammation in the mesentery as well as lateral pelvic and abdominal sidewalls.  There was no active disease.  The area of concern was tattooed by the gastroenterologist.  That correlated with my clinical impression of the disease segment.  Left colon and sigmoid colon were mobilized by incising the peritoneal reflection along the white line of Toldt.  Colon was rotated medially.  Sigmoid colon was very redundant on a very long mesentery. Intraperitoneal rectum was also quite long.  Mesentery was divided with LigaSure device. We took it down all the way to the peritoneal reflection.  Intraperitoneal rectum was divided with TIARRA device.    The stump of the colon was then exteriorized and we continued taking down the mesentery of the descending colon until we passed the area in question.  Junction of mid and distal descending colon was divided sharply.  It was measured and a 29 EEA device was chosen for the  anastomosis. End to end anastomosis was performed between mid rectum and mid descending colon.  In order to perform this we had mobilized the entire left colon however we did not completely mobilize the splenic flexure.  Patient's pelvic floor was quite lax.  The anastomosis and it up being quite a ways from the anal verge.    After the anastomosis was complete the operative field was flooded and rectum was insufflated with air to test the anastomosis for air tightness.  It was found to be airtight.  EEA donuts were examined and found to be complete and of good size.  Irrigant was aspirated.    That completed the procedure. Lap disc site was closed with 0 double-looped PDS sutures encompassing all fascial layers. Skin in all incisions was closed with skin staples.

## 2019-10-16 NOTE — ANESTHESIA PREPROCEDURE EVALUATION
10/16/2019  Daniela Lawson is a 54 y.o., female.    Anesthesia Evaluation    I have reviewed the Patient Summary Reports.    I have reviewed the Nursing Notes.   I have reviewed the Medications.     Review of Systems  Anesthesia Hx:  Low BP with Dilaudid Denies Family Hx of Anesthesia complications.  Personal Hx of Anesthesia complications, Post-Operative Nausea/Vomiting, in the past, but not with recent anesthetics / prophylaxis   Social:  Non-Smoker    EENT/Dental:   Caps both upper central incisors   Pulmonary:   Asthma mild    Hepatic/GI:   Hiatal hernia: treated with Nissen. GERD    Musculoskeletal:   H/o TMJ but no problems recently   Neurological:   Peripheral Neuropathy (both feet)        Physical Exam  General:  Well nourished    Airway/Jaw/Neck:  Airway Findings: Mouth Opening: Normal Tongue: Normal  Mallampati: II  TM Distance: Normal, at least 6 cm  Jaw/Neck Findings:  Neck ROM: Normal ROM      Dental:  Dental Findings: In tact   Chest/Lungs:  Chest/Lungs Findings: Clear to auscultation     Heart/Vascular:  Heart Findings: Rate: Normal  Rhythm: Regular Rhythm  Sounds: Normal  Heart murmur: negative       Mental Status:  Mental Status Findings:  Cooperative, Alert and Oriented         Anesthesia Plan  Type of Anesthesia, risks & benefits discussed:  Anesthesia Type:  general  Patient's Preference:   Intra-op Monitoring Plan: standard ASA monitors  Intra-op Monitoring Plan Comments:   Post Op Pain Control Plan: multimodal analgesia  Post Op Pain Control Plan Comments:   Induction:   IV  Beta Blocker:  Patient is on a Beta-Blocker and has received one dose within the past 24 hours (No further documentation required).       Informed Consent: Patient understands risks and agrees with Anesthesia plan.  Questions answered. Anesthesia consent signed with patient.  ASA Score: 2     Day of Surgery Review  of History & Physical:        Anesthesia Plan Notes: GETA  Zofran, Decadron 8mg, Pepcid, Benadryl 12.5mg  Ofirmev, gabapentin        Ready For Surgery From Anesthesia Perspective.

## 2019-10-16 NOTE — CONSULTS
[facility]  History & Physical    SUBJECTIVE:     Chief Complaint/Reason for Admission:     History of Present Illness:  Patient is a 54 y.o. female presents with history of diverticulitis  General surgery has asked me to place urinary stents  This is done to prevent any major urinary trauma  Will plan for cystoscopic stent placement bilateral    Facility-Administered Medications Prior to Admission   Medication    ceFOXItin (MEFOXIN) 2 g in dextrose 5 % 100 mL IVPB    lidocaine (PF) 10 mg/ml (1%) injection 10 mg     PTA Medications   Medication Sig    ALBUTEROL INHL Inhale 2 puffs into the lungs once a week.    amLODIPine (NORVASC) 2.5 MG tablet Take 1 tablet (2.5 mg total) by mouth Daily.    cetirizine 10 mg chewable tablet Take 10 mg by mouth once daily.    cholecalciferol, vitamin D3, (VITAMIN D3) 5,000 unit Tab 5,000 Units once daily.     cyanocobalamin, vitamin B-12, 5,000 mcg TbDL Take 5,000 Units by mouth once daily.     multivit-min/iron/folic/lutein (MULTIVITAMIN WOMEN 50 PLUS ORAL) Take 1 tablet by mouth Daily.    nebivolol (BYSTOLIC) 5 MG Tab Take 1 tablet (5 mg total) by mouth Daily.    salmeterol (SEREVENT) 50 mcg/dose diskus inhaler Inhale 1 puff into the lungs once daily. Controller    triamterene-hydrochlorothiazide 37.5-25 mg (MAXZIDE-25MG) 37.5-25 mg per tablet Take 1 tablet by mouth once daily.    turmeric root extract 500 mg Cap Take 500 mg by mouth once daily.     ciprofloxacin HCl (CIPRO) 500 MG tablet Take 500 mg by mouth every 12 (twelve) hours.    docusate sodium (COLACE) 100 MG capsule Take 2 tablets by mouth Daily.    FLAXSEED OIL ORAL Take 1,300 mg by mouth Daily.    glucosamine HCl/chondroitin pradhan (GLUCOSAMINE-CHONDROITIN ORAL) Take 1 tablet by mouth once daily.    krill oil 500 mg Cap Take 500 mg by mouth once daily.     magnesium oxide 500 mg Cap Take 1 capsule by mouth once daily.    metroNIDAZOLE (FLAGYL) 500 MG tablet Take 500 mg by mouth 3 (three)  times daily with meals.       Review of patient's allergies indicates:   Allergen Reactions    Lisinopril Other (See Comments)     Chest pain    Meloxicam     Meperidine     Oxycodone-acetaminophen        Past Medical History:   Diagnosis Date    Anesthesia 2009    Pt reports severe drop in BP when anesthesia given.    Asthma     Diverticulitis     GERD (gastroesophageal reflux disease)     Hypertension     Neuropathy 2007     Past Surgical History:   Procedure Laterality Date     SECTION  1990    CHOLECYSTECTOMY      HYSTERECTOMY  2003    SHOULDER ARTHROSCOPY Bilateral     STOMACH SURGERY      Possible Nissen per pt- To treat acid reflux     TUBAL LIGATION  1990     Family History   Problem Relation Age of Onset    Coronary artery disease Mother     Hyperlipidemia Mother     Other Mother         Breast Nodules    Arthritis Mother     Hypertension Father     Atrial fibrillation Father     Neuropathy Father     Other Father         Bypass    Diabetes Father     Hypertension Sister     Cancer Sister         Breast Cacner    Neuropathy Sister     Hypertension Daughter     No Known Problems Son     Diabetes Maternal Grandmother     Hypertension Maternal Grandmother     No Known Problems Maternal Grandfather     Coronary artery disease Paternal Grandmother     Cancer Paternal Grandmother         Breast Cancer    Heart attack Paternal Grandmother     Dementia Paternal Grandfather     Heart attack Paternal Grandfather     No Known Problems Sister     Other Daughter         Benign Hypertension(Brain)    Breast cancer Maternal Aunt     Breast cancer Paternal Aunt      Social History     Tobacco Use    Smoking status: Never Smoker    Smokeless tobacco: Never Used   Substance Use Topics    Alcohol use: Yes     Alcohol/week: 3.0 standard drinks     Types: 3 Glasses of wine per week     Frequency: Monthly or less     Comment: 3 glasses of wine per week     Drug  use: Never        Review of Systems:      OBJECTIVE:     Vital Signs (Most Recent):  Temp: 98.3 °F (36.8 °C) (10/16/19 0550)  Pulse: 75 (10/16/19 0550)  Resp: 18 (10/16/19 0550)  BP: 132/87 (10/16/19 0550)  SpO2: 95 % (10/16/19 0550)    Inpatient Medications:  Current Facility-Administered Medications   Medication Dose Route Frequency Provider Last Rate Last Dose    acetaminophen tablet 1,000 mg  1,000 mg Oral Once Pio Rainey MD        cefazolin (ANCEF) 2 gram in dextrose 5% 50 mL IVPB (premix)  2 g Intravenous On Call Procedure Doug Gifford MD        diphenhydrAMINE injection 12.5 mg  12.5 mg Intravenous Q15 Min PRN Pio Rainey MD        diphenhydrAMINE injection 12.5 mg  12.5 mg Intravenous Q4H PRN Pio Rainey MD        HYDROcodone-acetaminophen 5-325 mg per tablet 2 tablet  2 tablet Oral Q4H PRN Pio Rainey MD        morphine PCA 30 mg in NS 30 mL premix syringe (1mg/mL)   Intravenous Continuous Pio Rainey MD        naloxone 0.4 mg/mL injection 0.02 mg  0.02 mg Intravenous Once PRN Pio Rainey MD        ondansetron injection 4 mg  4 mg Intravenous Daily PRN Pio Rainey MD        ondansetron injection 4 mg  4 mg Intravenous Q6H PRN Pio Rainey MD        promethazine (PHENERGAN) 6.25 mg in dextrose 5 % 50 mL IVPB  6.25 mg Intravenous Q10 Min PRN Pio Rainey MD        promethazine (PHENERGAN) 6.25 mg in dextrose 5 % 50 mL IVPB  6.25 mg Intravenous Q6H PRN Pio Rainey MD        sodium chloride 0.9% flush 10 mL  10 mL Intravenous PRN Pio Rainey MD         Facility-Administered Medications Ordered in Other Encounters   Medication Dose Route Frequency Provider Last Rate Last Dose    acetaminophen (10 mg/mL) injection   Intravenous PRN Loli Longo CRNA   1,000 mg at 10/16/19 0822    ceFOXItin 2 g/50ml Dextrose IVPB    PRN Loli Longo CRNA   2 g at 10/16/19 0807    dexamethasone injection   Intravenous PRN Loli Longo CRNA   8 mg at 10/16/19 0820     diphenhydrAMINE injection    PRN Loli Longo CRNA   12.5 mg at 10/16/19 0820    famotidine (PF) injection   Intravenous PRN Loli Longo CRNA   20 mg at 10/16/19 0806    fentaNYL injection   Intravenous PRN Loli Longo, CRNA   50 mcg at 10/16/19 0813    lactated ringers infusion   Intravenous Continuous PRN Loli Longo CRNA        lidocaine (PF) 20 mg/mL (2%) injection   Intravenous PRN Loli Longo CRNA   100 mg at 10/16/19 0813    midazolam (VERSED) 1 mg/mL injection   Intravenous PRN Loli Longo CRNA   2 mg at 10/16/19 0806    propofol (DIPRIVAN) 10 mg/mL infusion   Intravenous PRN Loli Longo CRNA   30 mg at 10/16/19 0827    rocuronium injection   Intravenous PRN Loli Longo CRNA   10 mg at 10/16/19 0813    succinylcholine injection   Intravenous PRN Loli Longo, CRNA   120 mg at 10/16/19 0813          ASSESSMENT/PLAN:       Diverticulitis    Plan for laparoscopic colectomy  Planning for cystoscopic stent placement bilateral

## 2019-10-17 LAB
ANION GAP SERPL CALC-SCNC: 5 MMOL/L (ref 8–16)
BASOPHILS # BLD AUTO: 0.02 K/UL (ref 0–0.2)
BASOPHILS NFR BLD: 0.2 % (ref 0–1.9)
BUN SERPL-MCNC: 6 MG/DL (ref 6–20)
CALCIUM SERPL-MCNC: 7.7 MG/DL (ref 8.7–10.5)
CHLORIDE SERPL-SCNC: 108 MMOL/L (ref 95–110)
CO2 SERPL-SCNC: 27 MMOL/L (ref 23–29)
CREAT SERPL-MCNC: 0.6 MG/DL (ref 0.5–1.4)
DIFFERENTIAL METHOD: ABNORMAL
EOSINOPHIL # BLD AUTO: 0 K/UL (ref 0–0.5)
EOSINOPHIL NFR BLD: 0 % (ref 0–8)
ERYTHROCYTE [DISTWIDTH] IN BLOOD BY AUTOMATED COUNT: 12.3 % (ref 11.5–14.5)
EST. GFR  (AFRICAN AMERICAN): >60 ML/MIN/1.73 M^2
EST. GFR  (NON AFRICAN AMERICAN): >60 ML/MIN/1.73 M^2
GLUCOSE SERPL-MCNC: 125 MG/DL (ref 70–110)
HCT VFR BLD AUTO: 35.2 % (ref 37–48.5)
HGB BLD-MCNC: 11.9 G/DL (ref 12–16)
IMM GRANULOCYTES # BLD AUTO: 0.03 K/UL (ref 0–0.04)
IMM GRANULOCYTES NFR BLD AUTO: 0.3 % (ref 0–0.5)
LYMPHOCYTES # BLD AUTO: 0.9 K/UL (ref 1–4.8)
LYMPHOCYTES NFR BLD: 9.6 % (ref 18–48)
MCH RBC QN AUTO: 32.8 PG (ref 27–31)
MCHC RBC AUTO-ENTMCNC: 33.8 G/DL (ref 32–36)
MCV RBC AUTO: 97 FL (ref 82–98)
MONOCYTES # BLD AUTO: 0.6 K/UL (ref 0.3–1)
MONOCYTES NFR BLD: 6.4 % (ref 4–15)
NEUTROPHILS # BLD AUTO: 7.6 K/UL (ref 1.8–7.7)
NEUTROPHILS NFR BLD: 83.5 % (ref 38–73)
NRBC BLD-RTO: 0 /100 WBC
PLATELET # BLD AUTO: 205 K/UL (ref 150–350)
PMV BLD AUTO: 8.6 FL (ref 9.2–12.9)
POTASSIUM SERPL-SCNC: 3.9 MMOL/L (ref 3.5–5.1)
RBC # BLD AUTO: 3.63 M/UL (ref 4–5.4)
SODIUM SERPL-SCNC: 140 MMOL/L (ref 136–145)
WBC # BLD AUTO: 9.06 K/UL (ref 3.9–12.7)

## 2019-10-17 PROCEDURE — 94770 HC EXHALED C02 TEST: CPT

## 2019-10-17 PROCEDURE — 27000221 HC OXYGEN, UP TO 24 HOURS

## 2019-10-17 PROCEDURE — 25000003 PHARM REV CODE 250: Performed by: INTERNAL MEDICINE

## 2019-10-17 PROCEDURE — 36415 COLL VENOUS BLD VENIPUNCTURE: CPT

## 2019-10-17 PROCEDURE — 94761 N-INVAS EAR/PLS OXIMETRY MLT: CPT

## 2019-10-17 PROCEDURE — 85025 COMPLETE CBC W/AUTO DIFF WBC: CPT

## 2019-10-17 PROCEDURE — 63600175 PHARM REV CODE 636 W HCPCS: Performed by: SURGERY

## 2019-10-17 PROCEDURE — 63600175 PHARM REV CODE 636 W HCPCS: Performed by: ANESTHESIOLOGY

## 2019-10-17 PROCEDURE — 94799 UNLISTED PULMONARY SVC/PX: CPT

## 2019-10-17 PROCEDURE — 12000002 HC ACUTE/MED SURGE SEMI-PRIVATE ROOM

## 2019-10-17 PROCEDURE — 99900035 HC TECH TIME PER 15 MIN (STAT)

## 2019-10-17 PROCEDURE — C9113 INJ PANTOPRAZOLE SODIUM, VIA: HCPCS | Performed by: SURGERY

## 2019-10-17 PROCEDURE — 80048 BASIC METABOLIC PNL TOTAL CA: CPT

## 2019-10-17 PROCEDURE — 25000003 PHARM REV CODE 250: Performed by: SURGERY

## 2019-10-17 RX ORDER — ACETAMINOPHEN 325 MG/1
650 TABLET ORAL EVERY 6 HOURS PRN
Status: DISCONTINUED | OUTPATIENT
Start: 2019-10-17 | End: 2019-10-21 | Stop reason: HOSPADM

## 2019-10-17 RX ADMIN — MUPIROCIN 1 G: 20 OINTMENT TOPICAL at 08:10

## 2019-10-17 RX ADMIN — SODIUM CHLORIDE: 0.9 INJECTION, SOLUTION INTRAVENOUS at 10:10

## 2019-10-17 RX ADMIN — DIPHENHYDRAMINE HYDROCHLORIDE 12.5 MG: 50 INJECTION, SOLUTION INTRAMUSCULAR; INTRAVENOUS at 08:10

## 2019-10-17 RX ADMIN — MORPHINE SULFATE: 30 INJECTION, SOLUTION INTRAVENOUS; SUBCUTANEOUS at 12:10

## 2019-10-17 RX ADMIN — PANTOPRAZOLE SODIUM 40 MG: 40 INJECTION, POWDER, FOR SOLUTION INTRAVENOUS at 05:10

## 2019-10-17 RX ADMIN — MORPHINE SULFATE: 30 INJECTION, SOLUTION INTRAVENOUS; SUBCUTANEOUS at 08:10

## 2019-10-17 RX ADMIN — ACETAMINOPHEN 650 MG: 325 TABLET ORAL at 10:10

## 2019-10-17 RX ADMIN — DIPHENHYDRAMINE HYDROCHLORIDE 12.5 MG: 50 INJECTION, SOLUTION INTRAMUSCULAR; INTRAVENOUS at 11:10

## 2019-10-17 RX ADMIN — AMLODIPINE BESYLATE 2.5 MG: 2.5 TABLET ORAL at 08:10

## 2019-10-17 RX ADMIN — DIPHENHYDRAMINE HYDROCHLORIDE 12.5 MG: 50 INJECTION, SOLUTION INTRAMUSCULAR; INTRAVENOUS at 07:10

## 2019-10-17 RX ADMIN — SODIUM CHLORIDE: 0.9 INJECTION, SOLUTION INTRAVENOUS at 06:10

## 2019-10-17 RX ADMIN — MORPHINE SULFATE: 30 INJECTION, SOLUTION INTRAVENOUS; SUBCUTANEOUS at 03:10

## 2019-10-17 RX ADMIN — DIPHENHYDRAMINE HYDROCHLORIDE 12.5 MG: 50 INJECTION, SOLUTION INTRAMUSCULAR; INTRAVENOUS at 03:10

## 2019-10-17 RX ADMIN — ENOXAPARIN SODIUM 40 MG: 100 INJECTION SUBCUTANEOUS at 06:10

## 2019-10-17 RX ADMIN — SODIUM CHLORIDE: 0.9 INJECTION, SOLUTION INTRAVENOUS at 01:10

## 2019-10-17 NOTE — PLAN OF CARE
10/16/19 2120   Patient Assessment/Suction   Level of Consciousness (AVPU) alert   Respiratory Effort Normal;Unlabored   Expansion/Accessory Muscles/Retractions no use of accessory muscles   PRE-TX-O2   O2 Device (Oxygen Therapy) nasal cannula   Flow (L/min) 2   SpO2 96 %   Pulse Oximetry Type Continuous   Pulse 82   Resp 12   ETCO2   $ ETCO2 Charge Exhaled CO2 Monitoring   $ ETCO2 Usage Currently wearing   ETCO2 (mmHg) 40 mmHg   ETCO2 Device Type Portable Bedside Monitor   Incentive Spirometer   $ Incentive Spirometer Charges done with encouragement   Administration (IS) mouthpiece   Number of Repetitions (IS) 1010   Level Incentive Spirometer (mL) 2025   Patient Tolerance (IS) good   encourage incentive spirometer/monitor co2 and o2sat

## 2019-10-17 NOTE — ASSESSMENT & PLAN NOTE
S/p sigmoidectomy.  Routine post op care.  NGT in place while waiting for bowel function to return though she tells me she is passing gas and had a BM right after surgery.  PCA pump for pain control.  IVFs.

## 2019-10-17 NOTE — PROGRESS NOTES
[facility] Medicine  Progress Note    DOS:10/17/2019    Patient Name: Daniela Lawson  MRN: [id number]  Patient Class: IP- Inpatient   Admission Date: 10/16/2019  Length of Stay: 1 days  Attending Physician: Doug Gifford MD  Primary Care Provider: CHITO Shea,FNP-C        Subjective:     Principal Problem:Diverticulitis of large intestine without perforation or abscess without bleeding        HPI:  54 year old female with PMHx HTN, Asthma admitted s/p sigmoid colectomy for diverticulitis.  Patient reports abdominal pain that is diffuse, sharp, persistent, moderately severe.  Pain currently controlled via PCA pump.  NGT and O2 NC making her nose uncomfortable but tolerable. The SCDs are causing some discomfort to her LE neuropathy.    Overview/Hospital Course:  10/17: pod 1.  She is doing well.  Not passing gas yet.  Pain is well controlled on PCA.  NG was clamped in the morning for morning medications.  No acute events overnight as per nursing staff.  Does not appear to be ready for diet yet.      Interval History: pod 1.  She is doing well.  Not passing gas yet.  Pain is well controlled on PCA.  NG was clamped in the morning for morning medications.  No acute events overnight as per nursing staff.  Does not appear to be ready for diet yet.    Review of Systems   Constitutional: Negative.    HENT: Negative.    Eyes: Negative.    Respiratory: Negative.    Cardiovascular: Negative.    Gastrointestinal: Positive for abdominal pain.   Endocrine: Negative.    Genitourinary: Negative.    Musculoskeletal: Negative.    Skin: Negative.    Allergic/Immunologic: Negative.    Neurological: Negative.         Neuropathy to LEs   Hematological: Negative.    Psychiatric/Behavioral: Negative.      Objective:     Vital Signs (Most Recent):  Temp: 97.9 °F (36.6 °C) (10/17/19 1155)  Pulse: 76 (10/17/19 1155)  Resp: 18 (10/17/19 1155)  BP: 112/76 (10/17/19 1155)  SpO2: 98 % (10/17/19 1155) Vital  Signs (24h Range):  Temp:  [97.5 °F (36.4 °C)-98.2 °F (36.8 °C)] 97.9 °F (36.6 °C)  Pulse:  [72-85] 76  Resp:  [15-20] 18  SpO2:  [95 %-100 %] 98 %  BP: (112-149)/(75-91) 112/76     Weight: 86.5 kg (190 lb 11.2 oz)  Body mass index is 32.73 kg/m².    Intake/Output Summary (Last 24 hours) at 10/17/2019 1253  Last data filed at 10/17/2019 1209  Gross per 24 hour   Intake 3217.08 ml   Output 2925 ml   Net 292.08 ml      Physical Exam   Constitutional: She is oriented to person, place, and time. She appears well-developed. No distress.   HENT:   Head: Normocephalic and atraumatic.   Mouth/Throat: Oropharynx is clear and moist.   NGT  O2 per NC   Eyes: Pupils are equal, round, and reactive to light. EOM are normal.   Neck: Normal range of motion.   Pulmonary/Chest: Effort normal.   Abdominal: She exhibits no distension. There is tenderness.   Incisional tenderness,    Musculoskeletal: Normal range of motion.   Neurological: She is alert and oriented to person, place, and time. No cranial nerve deficit or sensory deficit.   Skin: No rash noted.   Psychiatric: She has a normal mood and affect.   Nursing note and vitals reviewed.      Significant Labs: All pertinent labs within the past 24 hours have been reviewed.    Significant Imaging: I have reviewed and interpreted all pertinent imaging results/findings within the past 24 hours.      Assessment/Plan:      * Diverticulitis of large intestine without perforation or abscess without bleeding  S/p sigmoidectomy POD 1  Routine post op care.  NGT in place while waiting for bowel function to return.  PCA pump for pain control.    IVFs.  Diet advancement and NG removal as per surgery team      Hypertension  At goal  Continue p.o. meds    HLD (hyperlipidemia)  Chronic condition.  No acute issues.  Monitoring.       Asthma  Chronic condition.  Monitoring.  No acute issues.        VTE Risk Mitigation (From admission, onward)         Ordered     enoxaparin injection 40 mg  Daily       10/16/19 1127     IP VTE HIGH RISK PATIENT  Once      10/16/19 1127     Place sequential compression device  Until discontinued      10/16/19 1127                      Alexei Lilly MD  Department of Hospital Medicine   [facility]

## 2019-10-17 NOTE — SUBJECTIVE & OBJECTIVE
Interval History: pod 1.  She is doing well.  Not passing gas yet.  Pain is well controlled on PCA.  NG was clamped in the morning for morning medications.  No acute events overnight as per nursing staff.  Does not appear to be ready for diet yet.    Review of Systems   Constitutional: Negative.    HENT: Negative.    Eyes: Negative.    Respiratory: Negative.    Cardiovascular: Negative.    Gastrointestinal: Positive for abdominal pain.   Endocrine: Negative.    Genitourinary: Negative.    Musculoskeletal: Negative.    Skin: Negative.    Allergic/Immunologic: Negative.    Neurological: Negative.         Neuropathy to LEs   Hematological: Negative.    Psychiatric/Behavioral: Negative.      Objective:     Vital Signs (Most Recent):  Temp: 97.9 °F (36.6 °C) (10/17/19 1155)  Pulse: 76 (10/17/19 1155)  Resp: 18 (10/17/19 1155)  BP: 112/76 (10/17/19 1155)  SpO2: 98 % (10/17/19 1155) Vital Signs (24h Range):  Temp:  [97.5 °F (36.4 °C)-98.2 °F (36.8 °C)] 97.9 °F (36.6 °C)  Pulse:  [72-85] 76  Resp:  [15-20] 18  SpO2:  [95 %-100 %] 98 %  BP: (112-149)/(75-91) 112/76     Weight: 86.5 kg (190 lb 11.2 oz)  Body mass index is 32.73 kg/m².    Intake/Output Summary (Last 24 hours) at 10/17/2019 1253  Last data filed at 10/17/2019 1209  Gross per 24 hour   Intake 3217.08 ml   Output 2925 ml   Net 292.08 ml      Physical Exam   Constitutional: She is oriented to person, place, and time. She appears well-developed. No distress.   HENT:   Head: Normocephalic and atraumatic.   Mouth/Throat: Oropharynx is clear and moist.   NGT  O2 per NC   Eyes: Pupils are equal, round, and reactive to light. EOM are normal.   Neck: Normal range of motion.   Pulmonary/Chest: Effort normal.   Abdominal: She exhibits no distension. There is tenderness.   Incisional tenderness,    Musculoskeletal: Normal range of motion.   Neurological: She is alert and oriented to person, place, and time. No cranial nerve deficit or sensory deficit.   Skin: No rash noted.    Psychiatric: She has a normal mood and affect.   Nursing note and vitals reviewed.      Significant Labs: All pertinent labs within the past 24 hours have been reviewed.    Significant Imaging: I have reviewed and interpreted all pertinent imaging results/findings within the past 24 hours.

## 2019-10-17 NOTE — PLAN OF CARE
Patient awake/alert. PCA remains in use. Ambulating/voiding without difficulty.Medicated with Benadryl this for itching. Plan of care continued.

## 2019-10-17 NOTE — ASSESSMENT & PLAN NOTE
Po medication held for now. NGT in place.  IV hydralazine prn for blood pressure control.  Monitoring.

## 2019-10-17 NOTE — CONSULTS
[facility] Medicine  Consult Note    Patient Name: Daniela Lawson  MRN: [id number]  Admission Date: 10/16/2019  Hospital Length of Stay: 0 days  Attending Physician: Marbella Guzman MD  Primary Care Provider: CHITO Shea,FNP-C           Patient information was obtained from patient and ER records.     Consults  Subjective:     Principal Problem: Diverticulitis of large intestine without perforation or abscess without bleeding    Chief Complaint: No chief complaint on file.       HPI: 54 year old female with PMHx HTN, Asthma admitted s/p sigmoid colectomy for diverticulitis.  Patient reports abdominal pain that is diffuse, sharp, persistent, moderately severe.  Pain currently controlled via PCA pump.  NGT and O2 NC making her nose uncomfortable but tolerable. The SCDs are causing some discomfort to her LE neuropathy.    Past Medical History:   Diagnosis Date    Anesthesia 2009    Pt reports severe drop in BP when anesthesia given.    Asthma     Diverticulitis     GERD (gastroesophageal reflux disease)     Hypertension     Neuropathy        Past Surgical History:   Procedure Laterality Date     SECTION  1990    CHOLECYSTECTOMY      HYSTERECTOMY  2003    SHOULDER ARTHROSCOPY Bilateral     STOMACH SURGERY      Possible Nissen per pt- To treat acid reflux     TUBAL LIGATION  1990       Review of patient's allergies indicates:   Allergen Reactions    Lisinopril Other (See Comments)     Chest pain    Meloxicam     Meperidine     Oxycodone-acetaminophen        No current facility-administered medications on file prior to encounter.      Current Outpatient Medications on File Prior to Encounter   Medication Sig    amLODIPine (NORVASC) 2.5 MG tablet Take 1 tablet (2.5 mg total) by mouth Daily.    cetirizine 10 mg chewable tablet Take 10 mg by mouth once daily.    cholecalciferol, vitamin D3, (VITAMIN D3) 5,000 unit Tab 5,000 Units once daily.      cyanocobalamin, vitamin B-12, 5,000 mcg TbDL Take 5,000 Units by mouth once daily.     multivit-min/iron/folic/lutein (MULTIVITAMIN WOMEN 50 PLUS ORAL) Take 1 tablet by mouth Daily.    nebivolol (BYSTOLIC) 5 MG Tab Take 1 tablet (5 mg total) by mouth Daily.    triamterene-hydrochlorothiazide 37.5-25 mg (MAXZIDE-25MG) 37.5-25 mg per tablet Take 1 tablet by mouth once daily.    turmeric root extract 500 mg Cap Take 500 mg by mouth once daily.     ciprofloxacin HCl (CIPRO) 500 MG tablet Take 500 mg by mouth every 12 (twelve) hours.    docusate sodium (COLACE) 100 MG capsule Take 2 tablets by mouth Daily.    FLAXSEED OIL ORAL Take 1,300 mg by mouth Daily.    glucosamine HCl/chondroitin pradhan (GLUCOSAMINE-CHONDROITIN ORAL) Take 1 tablet by mouth once daily.    krill oil 500 mg Cap Take 500 mg by mouth once daily.     magnesium oxide 500 mg Cap Take 1 capsule by mouth once daily.    metroNIDAZOLE (FLAGYL) 500 MG tablet Take 500 mg by mouth 3 (three) times daily with meals.     Family History     Problem Relation (Age of Onset)    Arthritis Mother    Atrial fibrillation Father    Breast cancer Maternal Aunt, Paternal Aunt    Cancer Sister, Paternal Grandmother    Coronary artery disease Mother, Paternal Grandmother    Dementia Paternal Grandfather    Diabetes Father, Maternal Grandmother    Heart attack Paternal Grandmother, Paternal Grandfather    Hyperlipidemia Mother    Hypertension Father, Sister, Daughter, Maternal Grandmother    Neuropathy Father, Sister    No Known Problems Son, Maternal Grandfather, Sister    Other Mother, Father, Daughter        Tobacco Use    Smoking status: Never Smoker    Smokeless tobacco: Never Used   Substance and Sexual Activity    Alcohol use: Yes     Alcohol/week: 3.0 standard drinks     Types: 3 Glasses of wine per week     Frequency: Monthly or less     Comment: 3 glasses of wine per week     Drug use: Never    Sexual activity: Not on file     Review of Systems    Constitutional: Negative.    HENT: Negative.    Eyes: Negative.    Respiratory: Negative.    Cardiovascular: Negative.    Gastrointestinal: Positive for abdominal pain.   Endocrine: Negative.    Genitourinary: Negative.    Musculoskeletal: Negative.    Skin: Negative.    Allergic/Immunologic: Negative.    Neurological: Negative.         Neuropathy to LEs   Hematological: Negative.    Psychiatric/Behavioral: Negative.      Objective:     Vital Signs (Most Recent):  Temp: 97.5 °F (36.4 °C) (10/16/19 1934)  Pulse: 79 (10/16/19 1934)  Resp: 18 (10/16/19 1934)  BP: 127/83 (10/16/19 1934)  SpO2: 96 % (10/16/19 1934) Vital Signs (24h Range):  Temp:  [97.4 °F (36.3 °C)-98.3 °F (36.8 °C)] 97.5 °F (36.4 °C)  Pulse:  [70-82] 79  Resp:  [14-22] 18  SpO2:  [95 %-100 %] 96 %  BP: (116-169)/(78-95) 127/83     Weight: 86.5 kg (190 lb 11.2 oz)  Body mass index is 32.73 kg/m².    Physical Exam   Constitutional: She is oriented to person, place, and time. She appears well-developed. No distress.   HENT:   Head: Normocephalic and atraumatic.   Mouth/Throat: Oropharynx is clear and moist.   NGT  O2 per NC   Eyes: Pupils are equal, round, and reactive to light. EOM are normal.   Neck: Normal range of motion.   Pulmonary/Chest: Effort normal.   Abdominal: She exhibits no distension. There is tenderness.   Musculoskeletal: Normal range of motion.   Neurological: She is alert and oriented to person, place, and time. No cranial nerve deficit or sensory deficit.   Skin: No rash noted.   Psychiatric: She has a normal mood and affect.   Nursing note and vitals reviewed.      Significant Labs: Hg 12.8, Cr 0.8  Significant Imaging: CT abd and pelvis with sigmoid diverticulitis     Assessment/Plan:     * Diverticulitis of large intestine without perforation or abscess without bleeding  S/p sigmoidectomy.  Routine post op care.  NGT in place while waiting for bowel function to return though she tells me she is passing gas and had a BM right after  surgery.  PCA pump for pain control.  IVFs.      Asthma  Chronic condition.  Monitoring.  No acute issues.      HLD (hyperlipidemia)  Chronic condition.  No acute issues.  Monitoring.       Hypertension  Po medication held for now. NGT in place.  IV hydralazine prn for blood pressure control.  Monitoring.         VTE Risk Mitigation (From admission, onward)         Ordered     enoxaparin injection 40 mg  Daily      10/16/19 1127     IP VTE HIGH RISK PATIENT  Once      10/16/19 1127     Place sequential compression device  Until discontinued      10/16/19 1127                    Thank you for your consult. I will follow-up with patient. Please contact us if you have any additional questions.    Marbella Guzman MD  Department of Hospital Medicine   [facility]

## 2019-10-17 NOTE — PLAN OF CARE
10/17/19 0836   PRE-TX-O2   O2 Device (Oxygen Therapy) nasal cannula   $ Is the patient on Low Flow Oxygen? Yes   Flow (L/min) 2   SpO2 99 %   Pulse Oximetry Type Continuous   $ Pulse Oximetry - Multiple Charge Pulse Oximetry - Multiple   Pulse 79   Resp 19   ETCO2   $ ETCO2 Charge Exhaled CO2 Monitoring   $ ETCO2 Usage Currently wearing   ETCO2 (mmHg) 35 mmHg   ETCO2 Device Type Portable Bedside Monitor   Incentive Spirometer   $ Incentive Spirometer Charges done independently per patient

## 2019-10-17 NOTE — HOSPITAL COURSE
POD# 4  Feeling better each day  No N/V  +flatus  + small green liquid BM this am  NG clamped  Tolerating clear liquids without nausea  K+ 2.4 @ 0400-40 meq @ 0645 repeated 0930 repeat K+ & Mag 1500 today  Pain 4/10 with movement -request tylenol  Chronic insomnia-request home regimen 25 mg benadryl

## 2019-10-17 NOTE — HPI
54 year old female with PMHx HTN, Asthma admitted s/p sigmoid colectomy for diverticulitis.  Patient reports abdominal pain that is diffuse, sharp, persistent, moderately severe.  Pain currently controlled via PCA pump.  NGT and O2 NC making her nose uncomfortable but tolerable. The SCDs are causing some discomfort to her LE neuropathy.

## 2019-10-17 NOTE — NURSING
Medication given by mouth. NG tube clamped at this time. Patient tolerating well. Will continue to monitor

## 2019-10-17 NOTE — ASSESSMENT & PLAN NOTE
S/p sigmoidectomy POD 1  Routine post op care.  NGT in place while waiting for bowel function to return.  PCA pump for pain control.    IVFs.  Diet advancement and NG removal as per surgery team

## 2019-10-17 NOTE — SUBJECTIVE & OBJECTIVE
Interval History:  Incisional pain under control. No flatus.  Ambulated well. No other complaints.    Medications:  Continuous Infusions:   sodium chloride 0.9% 125 mL/hr at 10/17/19 1006    sodium chloride 0.9%      morphine       Scheduled Meds:   acetaminophen  1,000 mg Oral Once    amLODIPine  2.5 mg Oral Daily    enoxaparin  40 mg Subcutaneous Daily    mupirocin  1 g Nasal BID    nebivolol  5 mg Oral Daily    pantoprazole  40 mg Intravenous Before breakfast     PRN Meds:diphenhydrAMINE, hydrALAZINE, HYDROcodone-acetaminophen, naloxone, ondansetron, promethazine (PHENERGAN) IVPB, sodium chloride 0.9%     Review of patient's allergies indicates:   Allergen Reactions    Lisinopril Other (See Comments)     Chest pain    Meloxicam     Meperidine     Oxycodone-acetaminophen      Objective:     Vital Signs (Most Recent):  Temp: 97.9 °F (36.6 °C) (10/17/19 1155)  Pulse: 76 (10/17/19 1155)  Resp: 18 (10/17/19 1155)  BP: 112/76 (10/17/19 1155)  SpO2: 98 % (10/17/19 1155) Vital Signs (24h Range):  Temp:  [97.5 °F (36.4 °C)-98.2 °F (36.8 °C)] 97.9 °F (36.6 °C)  Pulse:  [72-85] 76  Resp:  [15-20] 18  SpO2:  [95 %-100 %] 98 %  BP: (112-149)/(75-91) 112/76     Weight: 86.5 kg (190 lb 11.2 oz)  Body mass index is 32.73 kg/m².    Intake/Output - Last 3 Shifts       10/15 0700 - 10/16 0659 10/16 0700 - 10/17 0659 10/17 0700 - 10/18 0659    I.V. (mL/kg)  6284.1 (72.6) 83 (1)    IV Piggyback  50     Total Intake(mL/kg)  6334.1 (73.2) 83 (1)    Urine (mL/kg/hr)  2525 (1.2) 300 (0.6)    Drains  250     Blood  50     Total Output  2825 300    Net  +3509.1 -217                 Physical Exam   Constitutional: She is oriented to person, place, and time. She is cooperative. No distress.   Abdominal: Soft. She exhibits no distension. There is tenderness. There is no rebound and no guarding.   Incisional tenderness only   Neurological: She is oriented to person, place, and time.   Skin:   Incisions are clean, dry and  intact  There is no evidence of infection, hematoma or seroma        Significant Labs:  CBC:   Recent Labs   Lab 10/17/19  0502   WBC 9.06   RBC 3.63*   HGB 11.9*   HCT 35.2*      MCV 97   MCH 32.8*   MCHC 33.8     BMP:   Recent Labs   Lab 10/17/19  0502   *      K 3.9      CO2 27   BUN 6   CREATININE 0.6   CALCIUM 7.7*       Significant Diagnostics:  I have reviewed and interpreted all pertinent imaging results/findings within the past 24 hours.

## 2019-10-17 NOTE — PLAN OF CARE
Remains free of falls. Daughter at beside to assist with needs. Morphine PCA remains in place for post op pain. Both dressing in place. Midline dressing has small  amount of serous drainage noted. Abdominal binder in place. NG tube remains in place to low intermittent wall suction with greenish yellow bile colored drainage. No c/o nausea or vomiting.

## 2019-10-17 NOTE — PROGRESS NOTES
[facility]  General Surgery  Progress Note    Subjective:     History of Present Illness:  No notes on file    Post-Op Info:  Procedure(s) (LRB):  COLECTOMY, SIGMOID, LAPAROSCOPIC (N/A)  INSERTION, CATHETER, URETER (N/A)   1 Day Post-Op     Interval History:  Incisional pain under control. No flatus.  Ambulated well. No other complaints.    Medications:  Continuous Infusions:   sodium chloride 0.9% 125 mL/hr at 10/17/19 1006    sodium chloride 0.9%      morphine       Scheduled Meds:   acetaminophen  1,000 mg Oral Once    amLODIPine  2.5 mg Oral Daily    enoxaparin  40 mg Subcutaneous Daily    mupirocin  1 g Nasal BID    nebivolol  5 mg Oral Daily    pantoprazole  40 mg Intravenous Before breakfast     PRN Meds:diphenhydrAMINE, hydrALAZINE, HYDROcodone-acetaminophen, naloxone, ondansetron, promethazine (PHENERGAN) IVPB, sodium chloride 0.9%     Review of patient's allergies indicates:   Allergen Reactions    Lisinopril Other (See Comments)     Chest pain    Meloxicam     Meperidine     Oxycodone-acetaminophen      Objective:     Vital Signs (Most Recent):  Temp: 97.9 °F (36.6 °C) (10/17/19 1155)  Pulse: 76 (10/17/19 1155)  Resp: 18 (10/17/19 1155)  BP: 112/76 (10/17/19 1155)  SpO2: 98 % (10/17/19 1155) Vital Signs (24h Range):  Temp:  [97.5 °F (36.4 °C)-98.2 °F (36.8 °C)] 97.9 °F (36.6 °C)  Pulse:  [72-85] 76  Resp:  [15-20] 18  SpO2:  [95 %-100 %] 98 %  BP: (112-149)/(75-91) 112/76     Weight: 86.5 kg (190 lb 11.2 oz)  Body mass index is 32.73 kg/m².    Intake/Output - Last 3 Shifts       10/15 0700 - 10/16 0659 10/16 0700 - 10/17 0659 10/17 0700 - 10/18 0659    I.V. (mL/kg)  6284.1 (72.6) 83 (1)    IV Piggyback  50     Total Intake(mL/kg)  6334.1 (73.2) 83 (1)    Urine (mL/kg/hr)  2525 (1.2) 300 (0.6)    Drains  250     Blood  50     Total Output  2825 300    Net  +3509.1 -217                 Physical Exam   Constitutional: She is oriented to person, place, and time. She is cooperative.  No distress.   Abdominal: Soft. She exhibits no distension. There is tenderness. There is no rebound and no guarding.   Incisional tenderness only   Neurological: She is oriented to person, place, and time.   Skin:   Incisions are clean, dry and intact  There is no evidence of infection, hematoma or seroma        Significant Labs:  CBC:   Recent Labs   Lab 10/17/19  0502   WBC 9.06   RBC 3.63*   HGB 11.9*   HCT 35.2*      MCV 97   MCH 32.8*   MCHC 33.8     BMP:   Recent Labs   Lab 10/17/19  0502   *      K 3.9      CO2 27   BUN 6   CREATININE 0.6   CALCIUM 7.7*       Significant Diagnostics:  I have reviewed and interpreted all pertinent imaging results/findings within the past 24 hours.    Assessment/Plan:     * Diverticulitis of large intestine without perforation or abscess without bleeding  Doing very well postop.  Did not ready for diet yet.        Doug Gifford MD  General Surgery  [facility]

## 2019-10-17 NOTE — SUBJECTIVE & OBJECTIVE
Past Medical History:   Diagnosis Date    Anesthesia 2009    Pt reports severe drop in BP when anesthesia given.    Asthma     Diverticulitis     GERD (gastroesophageal reflux disease)     Hypertension     Neuropathy        Past Surgical History:   Procedure Laterality Date     SECTION  1990    CHOLECYSTECTOMY      HYSTERECTOMY  2003    SHOULDER ARTHROSCOPY Bilateral     STOMACH SURGERY      Possible Nissen per pt- To treat acid reflux     TUBAL LIGATION  1990       Review of patient's allergies indicates:   Allergen Reactions    Lisinopril Other (See Comments)     Chest pain    Meloxicam     Meperidine     Oxycodone-acetaminophen        No current facility-administered medications on file prior to encounter.      Current Outpatient Medications on File Prior to Encounter   Medication Sig    amLODIPine (NORVASC) 2.5 MG tablet Take 1 tablet (2.5 mg total) by mouth Daily.    cetirizine 10 mg chewable tablet Take 10 mg by mouth once daily.    cholecalciferol, vitamin D3, (VITAMIN D3) 5,000 unit Tab 5,000 Units once daily.     cyanocobalamin, vitamin B-12, 5,000 mcg TbDL Take 5,000 Units by mouth once daily.     multivit-min/iron/folic/lutein (MULTIVITAMIN WOMEN 50 PLUS ORAL) Take 1 tablet by mouth Daily.    nebivolol (BYSTOLIC) 5 MG Tab Take 1 tablet (5 mg total) by mouth Daily.    triamterene-hydrochlorothiazide 37.5-25 mg (MAXZIDE-25MG) 37.5-25 mg per tablet Take 1 tablet by mouth once daily.    turmeric root extract 500 mg Cap Take 500 mg by mouth once daily.     ciprofloxacin HCl (CIPRO) 500 MG tablet Take 500 mg by mouth every 12 (twelve) hours.    docusate sodium (COLACE) 100 MG capsule Take 2 tablets by mouth Daily.    FLAXSEED OIL ORAL Take 1,300 mg by mouth Daily.    glucosamine HCl/chondroitin pradhan (GLUCOSAMINE-CHONDROITIN ORAL) Take 1 tablet by mouth once daily.    krill oil 500 mg Cap Take 500 mg by mouth once daily.     magnesium oxide 500 mg Cap Take 1  capsule by mouth once daily.    metroNIDAZOLE (FLAGYL) 500 MG tablet Take 500 mg by mouth 3 (three) times daily with meals.     Family History     Problem Relation (Age of Onset)    Arthritis Mother    Atrial fibrillation Father    Breast cancer Maternal Aunt, Paternal Aunt    Cancer Sister, Paternal Grandmother    Coronary artery disease Mother, Paternal Grandmother    Dementia Paternal Grandfather    Diabetes Father, Maternal Grandmother    Heart attack Paternal Grandmother, Paternal Grandfather    Hyperlipidemia Mother    Hypertension Father, Sister, Daughter, Maternal Grandmother    Neuropathy Father, Sister    No Known Problems Son, Maternal Grandfather, Sister    Other Mother, Father, Daughter        Tobacco Use    Smoking status: Never Smoker    Smokeless tobacco: Never Used   Substance and Sexual Activity    Alcohol use: Yes     Alcohol/week: 3.0 standard drinks     Types: 3 Glasses of wine per week     Frequency: Monthly or less     Comment: 3 glasses of wine per week     Drug use: Never    Sexual activity: Not on file     Review of Systems   Constitutional: Negative.    HENT: Negative.    Eyes: Negative.    Respiratory: Negative.    Cardiovascular: Negative.    Gastrointestinal: Positive for abdominal pain.   Endocrine: Negative.    Genitourinary: Negative.    Musculoskeletal: Negative.    Skin: Negative.    Allergic/Immunologic: Negative.    Neurological: Negative.         Neuropathy to LEs   Hematological: Negative.    Psychiatric/Behavioral: Negative.      Objective:     Vital Signs (Most Recent):  Temp: 97.5 °F (36.4 °C) (10/16/19 1934)  Pulse: 79 (10/16/19 1934)  Resp: 18 (10/16/19 1934)  BP: 127/83 (10/16/19 1934)  SpO2: 96 % (10/16/19 1934) Vital Signs (24h Range):  Temp:  [97.4 °F (36.3 °C)-98.3 °F (36.8 °C)] 97.5 °F (36.4 °C)  Pulse:  [70-82] 79  Resp:  [14-22] 18  SpO2:  [95 %-100 %] 96 %  BP: (116-169)/(78-95) 127/83     Weight: 86.5 kg (190 lb 11.2 oz)  Body mass index is 32.73  kg/m².    Physical Exam   Constitutional: She is oriented to person, place, and time. She appears well-developed. No distress.   HENT:   Head: Normocephalic and atraumatic.   Mouth/Throat: Oropharynx is clear and moist.   NGT  O2 per NC   Eyes: Pupils are equal, round, and reactive to light. EOM are normal.   Neck: Normal range of motion.   Pulmonary/Chest: Effort normal.   Abdominal: She exhibits no distension. There is tenderness.   Musculoskeletal: Normal range of motion.   Neurological: She is alert and oriented to person, place, and time. No cranial nerve deficit or sensory deficit.   Skin: No rash noted.   Psychiatric: She has a normal mood and affect.   Nursing note and vitals reviewed.      Significant Labs: Hg 12.8, Cr 0.8  Significant Imaging: CT abd and pelvis with sigmoid diverticulitis

## 2019-10-18 ENCOUNTER — ANESTHESIA EVENT (OUTPATIENT)
Dept: OBSTETRICS AND GYNECOLOGY | Facility: HOSPITAL | Age: 54
End: 2019-10-18

## 2019-10-18 ENCOUNTER — ANESTHESIA (OUTPATIENT)
Dept: OBSTETRICS AND GYNECOLOGY | Facility: HOSPITAL | Age: 54
End: 2019-10-18

## 2019-10-18 PROCEDURE — 63600175 PHARM REV CODE 636 W HCPCS: Performed by: SURGERY

## 2019-10-18 PROCEDURE — 94770 HC EXHALED C02 TEST: CPT

## 2019-10-18 PROCEDURE — C9113 INJ PANTOPRAZOLE SODIUM, VIA: HCPCS | Performed by: SURGERY

## 2019-10-18 PROCEDURE — 12000002 HC ACUTE/MED SURGE SEMI-PRIVATE ROOM

## 2019-10-18 PROCEDURE — 99900035 HC TECH TIME PER 15 MIN (STAT)

## 2019-10-18 PROCEDURE — 94761 N-INVAS EAR/PLS OXIMETRY MLT: CPT

## 2019-10-18 PROCEDURE — 25000003 PHARM REV CODE 250: Performed by: INTERNAL MEDICINE

## 2019-10-18 PROCEDURE — 27000221 HC OXYGEN, UP TO 24 HOURS

## 2019-10-18 RX ORDER — CETIRIZINE HYDROCHLORIDE 10 MG/1
10 TABLET ORAL DAILY
Status: DISCONTINUED | OUTPATIENT
Start: 2019-10-18 | End: 2019-10-21 | Stop reason: HOSPADM

## 2019-10-18 RX ORDER — ACETAMINOPHEN 10 MG/ML
1000 INJECTION, SOLUTION INTRAVENOUS EVERY 6 HOURS
Status: COMPLETED | OUTPATIENT
Start: 2019-10-18 | End: 2019-10-19

## 2019-10-18 RX ADMIN — ACETAMINOPHEN 650 MG: 325 TABLET ORAL at 09:10

## 2019-10-18 RX ADMIN — SODIUM CHLORIDE: 0.9 INJECTION, SOLUTION INTRAVENOUS at 10:10

## 2019-10-18 RX ADMIN — PANTOPRAZOLE SODIUM 40 MG: 40 INJECTION, POWDER, FOR SOLUTION INTRAVENOUS at 05:10

## 2019-10-18 RX ADMIN — ONDANSETRON 4 MG: 2 INJECTION INTRAMUSCULAR; INTRAVENOUS at 10:10

## 2019-10-18 RX ADMIN — MUPIROCIN 1 G: 20 OINTMENT TOPICAL at 09:10

## 2019-10-18 RX ADMIN — ONDANSETRON 4 MG: 2 INJECTION INTRAMUSCULAR; INTRAVENOUS at 12:10

## 2019-10-18 RX ADMIN — PROMETHAZINE HYDROCHLORIDE: 25 INJECTION INTRAMUSCULAR; INTRAVENOUS at 11:10

## 2019-10-18 RX ADMIN — ENOXAPARIN SODIUM 40 MG: 100 INJECTION SUBCUTANEOUS at 05:10

## 2019-10-18 RX ADMIN — SODIUM CHLORIDE: 0.9 INJECTION, SOLUTION INTRAVENOUS at 01:10

## 2019-10-18 RX ADMIN — SODIUM CHLORIDE: 0.9 INJECTION, SOLUTION INTRAVENOUS at 07:10

## 2019-10-18 RX ADMIN — PROMETHAZINE HYDROCHLORIDE 6.25 MG: 25 INJECTION INTRAMUSCULAR; INTRAVENOUS at 05:10

## 2019-10-18 RX ADMIN — ACETAMINOPHEN 1000 MG: 10 INJECTION, SOLUTION INTRAVENOUS at 05:10

## 2019-10-18 NOTE — SUBJECTIVE & OBJECTIVE
Interval History: POD #2   AF VSS, good uop, -bm/-flatus, ambulating. Using IS. Not using PCA pump and pain controlled on tylenol.       Pt reports feeling generalized fatigue and malaise. Discomfort from NG tube. Denies any CP, SOB, N/V/D, abdominal pain, headaches, fever or chills.       Scheduled Meds:   acetaminophen  1,000 mg Oral Once    amLODIPine  2.5 mg Oral Daily    enoxaparin  40 mg Subcutaneous Daily    mupirocin  1 g Nasal BID    nebivolol  5 mg Oral Daily    pantoprazole  40 mg Intravenous Before breakfast     Continuous Infusions:   sodium chloride 0.9% 125 mL/hr at 10/18/19 0140    sodium chloride 0.9%      morphine       PRN Meds:acetaminophen, diphenhydrAMINE, hydrALAZINE, HYDROcodone-acetaminophen, naloxone, ondansetron, promethazine (PHENERGAN) IVPB, sodium chloride 0.9%    Review of patient's allergies indicates:   Allergen Reactions    Lisinopril Other (See Comments)     Chest pain    Meloxicam     Meperidine     Oxycodone-acetaminophen          Review of Systems   Per interval history, all other systems reviewed and negative.     Objective:     Vital Signs (Most Recent):  Temp: 98 °F (36.7 °C) (10/18/19 0755)  Pulse: 94 (10/18/19 0755)  Resp: 18 (10/18/19 0755)  BP: 123/82 (10/18/19 0755)  SpO2: 97 % (10/18/19 0755) Vital Signs (24h Range):  Temp:  [97.8 °F (36.6 °C)-98.1 °F (36.7 °C)] 98 °F (36.7 °C)  Pulse:  [73-98] 94  Resp:  [16-19] 18  SpO2:  [95 %-100 %] 97 %  BP: (112-129)/(70-82) 123/82     Weight: 86.5 kg (190 lb 11.2 oz)  Body mass index is 32.73 kg/m².    Intake/Output Summary (Last 24 hours) at 10/18/2019 0946  Last data filed at 10/18/2019 0745  Gross per 24 hour   Intake 545 ml   Output 2125 ml   Net -1580 ml      Physical Exam   Constitutional: She is oriented to person, place, and time. She appears well-developed. No distress.   HENT:   Head: Normocephalic and atraumatic.   Mouth/Throat: Oropharynx is clear and moist.   NGT  O2 per NC   Eyes: Pupils are equal,  round, and reactive to light. EOM are normal.   Neck: Normal range of motion.   Pulmonary/Chest: Effort normal.   Abdominal: She exhibits no distension. There is tenderness.   Incisional tenderness,    Musculoskeletal: Normal range of motion.   Neurological: She is alert and oriented to person, place, and time. No cranial nerve deficit or sensory deficit.   Skin: No rash noted.   Psychiatric: She has a normal mood and affect.   Nursing note and vitals reviewed.      Significant Labs:   BMP:   Recent Labs   Lab 10/17/19  0502   *      K 3.9      CO2 27   BUN 6   CREATININE 0.6   CALCIUM 7.7*     CBC:   Recent Labs   Lab 10/17/19  0502   WBC 9.06   HGB 11.9*   HCT 35.2*        All pertinent labs within the past 24 hours have been reviewed.    Significant Imaging: I have reviewed all pertinent imaging results/findings within the past 24 hours.

## 2019-10-18 NOTE — PLAN OF CARE
10/18/19 1140   Patient Assessment/Suction   Level of Consciousness (AVPU) alert   Respiratory Effort Normal;Unlabored   Expansion/Accessory Muscles/Retractions expansion symmetric;no use of accessory muscles   Rhythm/Pattern, Respiratory pattern regular   PRE-TX-O2   O2 Device (Oxygen Therapy) nasal cannula  (stby)   $ Is the patient on Low Flow Oxygen? Yes   Flow (L/min) 2   SpO2 97 %   Pulse Oximetry Type Intermittent   $ Pulse Oximetry - Multiple Charge Pulse Oximetry - Multiple   Pulse 85   Resp 17   Positioning Left side;HOB elevated 30 degrees   ETCO2   $ ETCO2 Charge Exhaled CO2 Monitoring   $ ETCO2 Usage At bedside  (PCA on stby along with ETCO2 monitor)   RN notified if not using Yes  (NAVIN Singh)

## 2019-10-18 NOTE — PROGRESS NOTES
[facility] Medicine  Progress Note    Patient Name: Daniela Lawson  MRN: [id number]  Patient Class: IP- Inpatient   Admission Date: 10/16/2019  Length of Stay: 2 days  Attending Physician: Doug Gifford MD  Primary Care Provider: CHITO Shea,FNP-C      Subjective:     Principal Problem:Diverticulitis of large intestine without perforation or abscess without bleeding    HPI:  54 year old female with PMHx HTN, Asthma admitted s/p sigmoid colectomy for diverticulitis.  Patient reports abdominal pain that is diffuse, sharp, persistent, moderately severe.  Pain currently controlled via PCA pump.  NGT and O2 NC making her nose uncomfortable but tolerable. The SCDs are causing some discomfort to her LE neuropathy.    Overview/Hospital Course:  10/17: pod 1.  She is doing well.  Not passing gas yet.  Pain is well controlled on PCA.  NG was clamped in the morning for morning medications.  No acute events overnight as per nursing staff.  Does not appear to be ready for diet yet.      Interval History: POD #2   AF VSS, good uop, -bm/-flatus, ambulating. Using IS. Not using PCA pump and pain controlled on tylenol.       Pt reports feeling generalized fatigue and malaise. Discomfort from NG tube. Denies any CP, SOB, N/V/D, abdominal pain, headaches, fever or chills.       Scheduled Meds:   acetaminophen  1,000 mg Oral Once    amLODIPine  2.5 mg Oral Daily    enoxaparin  40 mg Subcutaneous Daily    mupirocin  1 g Nasal BID    nebivolol  5 mg Oral Daily    pantoprazole  40 mg Intravenous Before breakfast     Continuous Infusions:   sodium chloride 0.9% 125 mL/hr at 10/18/19 0140    sodium chloride 0.9%      morphine       PRN Meds:acetaminophen, diphenhydrAMINE, hydrALAZINE, HYDROcodone-acetaminophen, naloxone, ondansetron, promethazine (PHENERGAN) IVPB, sodium chloride 0.9%    Review of patient's allergies indicates:   Allergen Reactions    Lisinopril Other (See Comments)      Chest pain    Meloxicam     Meperidine     Oxycodone-acetaminophen          Review of Systems   Per interval history, all other systems reviewed and negative.     Objective:     Vital Signs (Most Recent):  Temp: 98 °F (36.7 °C) (10/18/19 0755)  Pulse: 94 (10/18/19 0755)  Resp: 18 (10/18/19 0755)  BP: 123/82 (10/18/19 0755)  SpO2: 97 % (10/18/19 0755) Vital Signs (24h Range):  Temp:  [97.8 °F (36.6 °C)-98.1 °F (36.7 °C)] 98 °F (36.7 °C)  Pulse:  [73-98] 94  Resp:  [16-19] 18  SpO2:  [95 %-100 %] 97 %  BP: (112-129)/(70-82) 123/82     Weight: 86.5 kg (190 lb 11.2 oz)  Body mass index is 32.73 kg/m².    Intake/Output Summary (Last 24 hours) at 10/18/2019 0955  Last data filed at 10/18/2019 0745  Gross per 24 hour   Intake 545 ml   Output 2125 ml   Net -1580 ml      Physical Exam   Constitutional: She is oriented to person, place, and time. She appears well-developed. No distress.   HENT:   Head: Normocephalic and atraumatic.   Mouth/Throat: Oropharynx is clear and moist.   NGT  O2 per NC   Eyes: Pupils are equal, round, and reactive to light. EOM are normal.   Neck: Normal range of motion.   Pulmonary/Chest: Effort normal.   Abdominal: She exhibits no distension. There is tenderness.   Incisional tenderness,    Musculoskeletal: Normal range of motion.   Neurological: She is alert and oriented to person, place, and time. No cranial nerve deficit or sensory deficit.   Skin: No rash noted.   Psychiatric: She has a normal mood and affect.   Nursing note and vitals reviewed.      Significant Labs:   BMP:   Recent Labs   Lab 10/17/19  0502   *      K 3.9      CO2 27   BUN 6   CREATININE 0.6   CALCIUM 7.7*     CBC:   Recent Labs   Lab 10/17/19  0502   WBC 9.06   HGB 11.9*   HCT 35.2*        All pertinent labs within the past 24 hours have been reviewed.    Significant Imaging: I have reviewed all pertinent imaging results/findings within the past 24 hours.         Assessment/Plan:      *  Diverticulitis of large intestine without perforation or abscess without bleeding  S/p sigmoidectomy POD 2  Routine post op care.  \  NGT in place while waiting for bowel function to return.  PCA pump for pain control. Patient hasn't used. Holding for now.   IVFs.  Diet advancement and NG removal as per surgery team      Asthma  Chronic condition.  Monitoring.  No acute issues.  Reports hx of allergies and would like to restart zyrtec.       HLD (hyperlipidemia)  Chronic condition.  No acute issues.  Monitoring.       Hypertension  At goal  Continue home medications via NG when possible      VTE Risk Mitigation (From admission, onward)         Ordered     enoxaparin injection 40 mg  Daily      10/16/19 1127     IP VTE HIGH RISK PATIENT  Once      10/16/19 1127     Place sequential compression device  Until discontinued      10/16/19 1127                  Alessandra Funez DO  Department of Hospital Medicine   [facility]

## 2019-10-18 NOTE — NURSING
1654: spoke with Dr Galvez, notified her pt was requesting Ofirmiv instead of PO Tylenol. Received verbal order for Ofirmiv 1gm, Q6H PRN.

## 2019-10-18 NOTE — ANESTHESIA POST-OP PAIN MANAGEMENT
Acute Pain Service Progress Note    Daniela Lawson is a 54 y.o., female, [id number].    Postop day 2.  Patient is currently resting comfortably with good oxygen saturation and end-tidal CO2.  Nurse says that she has had good pain control with her PCA morphine (119 mg over the past 41 hr).  No sedation or respiratory depression.  No nausea or itching.  Patient still NPO. Continue PCA.  Will follow.

## 2019-10-18 NOTE — PLAN OF CARE
10/17/19 1900   Patient Assessment/Suction   Level of Consciousness (AVPU) alert   Respiratory Effort Normal;Unlabored   Expansion/Accessory Muscles/Retractions no use of accessory muscles   PRE-TX-O2   O2 Device (Oxygen Therapy) nasal cannula   Flow (L/min) 2   SpO2 95 %   Pulse Oximetry Type Continuous   Pulse 75   Resp 16   ETCO2   $ ETCO2 Charge Exhaled CO2 Monitoring   $ ETCO2 Usage Currently wearing   ETCO2 (mmHg) 44 mmHg   ETCO2 Device Type Portable Bedside Monitor   Incentive Spirometer   $ Incentive Spirometer Charges done with encouragement   Administration (IS) mouthpiece   Number of Repetitions (IS) 10   Level Incentive Spirometer (mL) 2000   Patient Tolerance (IS) good   encourage incentive spirometer/maintain adequate oxygenation/ventilation

## 2019-10-18 NOTE — ASSESSMENT & PLAN NOTE
Chronic condition.  Monitoring.  No acute issues.  Reports hx of allergies and would like to restart zyrtec.

## 2019-10-18 NOTE — ASSESSMENT & PLAN NOTE
S/p sigmoidectomy POD 2  Routine post op care.  \  NGT in place while waiting for bowel function to return.  PCA pump for pain control. Patient hasn't used. Holding for now.   IVFs.  Diet advancement and NG removal as per surgery team

## 2019-10-18 NOTE — PLAN OF CARE
PT still c/o nausea and headache, not using PCA pump, received order for Ofirmiv. PT voiding without difficulty, ambulating in hallway several times a day, denies flatus.

## 2019-10-18 NOTE — PLAN OF CARE
Patient resting in bed.  at the bedside. Patient has no complaints at this time. Patient informed to call nurse for any needs.

## 2019-10-18 NOTE — PROGRESS NOTES
Patient has multiple complaints this morning however the all centered around her nasogastric tube. She has a sore throat and lot of phlegm.  Very mild nausea.  Denies flatus.  Incisional pain under control. Patient is compliant with ambulation.    Abdomen is soft, only incisional tenderness which is fairly mild.  Dressings are intact.  Bowel sounds are significantly decreased.  Laboratory reviewed.  Is basically normal.    Impression/plan:  Doing well postop day 2 after laparoscopic sigmoid colectomy.  Not ready for diet.  We could try taking patient's NG out however she is reluctant to do so because she does want reinserted.  No other changes for today.

## 2019-10-19 PROCEDURE — 63600175 PHARM REV CODE 636 W HCPCS: Performed by: SURGERY

## 2019-10-19 PROCEDURE — 25000003 PHARM REV CODE 250: Performed by: STUDENT IN AN ORGANIZED HEALTH CARE EDUCATION/TRAINING PROGRAM

## 2019-10-19 PROCEDURE — C9113 INJ PANTOPRAZOLE SODIUM, VIA: HCPCS | Performed by: SURGERY

## 2019-10-19 PROCEDURE — 94761 N-INVAS EAR/PLS OXIMETRY MLT: CPT

## 2019-10-19 PROCEDURE — 25000003 PHARM REV CODE 250: Performed by: SURGERY

## 2019-10-19 PROCEDURE — 12000002 HC ACUTE/MED SURGE SEMI-PRIVATE ROOM

## 2019-10-19 PROCEDURE — 63600175 PHARM REV CODE 636 W HCPCS: Performed by: ANESTHESIOLOGY

## 2019-10-19 RX ORDER — KETOROLAC TROMETHAMINE 30 MG/ML
15 INJECTION, SOLUTION INTRAMUSCULAR; INTRAVENOUS EVERY 6 HOURS PRN
Status: DISCONTINUED | OUTPATIENT
Start: 2019-10-19 | End: 2019-10-21 | Stop reason: HOSPADM

## 2019-10-19 RX ADMIN — AMLODIPINE BESYLATE 2.5 MG: 2.5 TABLET ORAL at 05:10

## 2019-10-19 RX ADMIN — KETOROLAC TROMETHAMINE 15 MG: 30 INJECTION, SOLUTION INTRAMUSCULAR at 03:10

## 2019-10-19 RX ADMIN — ACETAMINOPHEN 1000 MG: 10 INJECTION, SOLUTION INTRAVENOUS at 05:10

## 2019-10-19 RX ADMIN — SODIUM CHLORIDE: 0.9 INJECTION, SOLUTION INTRAVENOUS at 01:10

## 2019-10-19 RX ADMIN — ONDANSETRON 4 MG: 2 INJECTION INTRAMUSCULAR; INTRAVENOUS at 10:10

## 2019-10-19 RX ADMIN — NEBIVOLOL HYDROCHLORIDE 5 MG: 5 TABLET ORAL at 05:10

## 2019-10-19 RX ADMIN — PROMETHAZINE HYDROCHLORIDE 6.25 MG: 25 INJECTION INTRAMUSCULAR; INTRAVENOUS at 12:10

## 2019-10-19 RX ADMIN — MUPIROCIN 1 G: 20 OINTMENT TOPICAL at 12:10

## 2019-10-19 RX ADMIN — ACETAMINOPHEN 1000 MG: 10 INJECTION, SOLUTION INTRAVENOUS at 12:10

## 2019-10-19 RX ADMIN — KETOROLAC TROMETHAMINE 15 MG: 30 INJECTION, SOLUTION INTRAMUSCULAR at 10:10

## 2019-10-19 RX ADMIN — PANTOPRAZOLE SODIUM 40 MG: 40 INJECTION, POWDER, FOR SOLUTION INTRAVENOUS at 05:10

## 2019-10-19 RX ADMIN — CETIRIZINE HYDROCHLORIDE 10 MG: 10 TABLET, FILM COATED ORAL at 11:10

## 2019-10-19 RX ADMIN — MUPIROCIN 1 G: 20 OINTMENT TOPICAL at 08:10

## 2019-10-19 RX ADMIN — ENOXAPARIN SODIUM 40 MG: 100 INJECTION SUBCUTANEOUS at 05:10

## 2019-10-19 NOTE — ANESTHESIA POST-OP PAIN MANAGEMENT
Acute Pain Service Progress Note    Daniela Lawson is a 54 y.o., female, [id number].    Adequate analgesia with IV acetaminophen and only a few mg of morphine per PCA over the past 24 hr.  Patient does not like the Morphine because it makes her itch.  Patient has had some nausea, relieved with Zofran and Phenergan.  No sedation or respiratory depression.  Still NPO with NG tube.  Patient says that meloxicam gave her edema of her lower extremities. She has tolerated well ibuprofen and Toradol in the past.  I will discontinue her PCA and add Toradol as needed for pain. Patient agrees with this plan.  Signing off.  Reconsult if needed.

## 2019-10-19 NOTE — ASSESSMENT & PLAN NOTE
S/p sigmoidectomy POD 3  Routine post op care.   NGT in place while waiting for bowel function to return.  Pain control, IV Tylenol and Toradol p.r.n.  Antiemetics p.r.n.  IVFs.  Diet advancement and NG removal as per surgery team

## 2019-10-19 NOTE — PROGRESS NOTES
[facility] Medicine  Progress Note    Patient Name: Daniela Lawson  MRN: [id number]  Patient Class: IP- Inpatient   Admission Date: 10/16/2019  Length of Stay: 3 days  Attending Physician: Doug Gifford MD  Primary Care Provider: CHITO Shea,FNP-C        Subjective:     Principal Problem:Diverticulitis of large intestine without perforation or abscess without bleeding        HPI:  54 year old female with PMHx HTN, Asthma admitted s/p sigmoid colectomy for diverticulitis.  Patient reports abdominal pain that is diffuse, sharp, persistent, moderately severe.  Pain currently controlled via PCA pump.  NGT and O2 NC making her nose uncomfortable but tolerable. The SCDs are causing some discomfort to her LE neuropathy.    Overview/Hospital Course:  10/17: pod 1.  She is doing well.  Not passing gas yet.  Pain is well controlled on PCA.  NG was clamped in the morning for morning medications.  No acute events overnight as per nursing staff.  Does not appear to be ready for diet yet.      Interval History: PCA d/c'd by Anesthesia. POD 3  AF VSS, good uop, -flatus, states she passed a blood clot this morning but no stool, NGT in place. ambulating. Ab pain tolerable with current regimen.   Denies any CP, SOB, N/V/D, headaches, fever or chills.       Review of Systems   Per interval history, all other systems reviewed and negative.     Objective:     Vital Signs (Most Recent):  Temp: 97.8 °F (36.6 °C) (10/19/19 0750)  Pulse: 78 (10/19/19 0750)  Resp: 18 (10/19/19 0750)  BP: 135/87 (10/19/19 0750)  SpO2: 98 % (10/19/19 0750) Vital Signs (24h Range):  Temp:  [97.8 °F (36.6 °C)-98.2 °F (36.8 °C)] 97.8 °F (36.6 °C)  Pulse:  [64-85] 78  Resp:  [17-18] 18  SpO2:  [96 %-100 %] 98 %  BP: (120-135)/(71-87) 135/87     Weight: 86.5 kg (190 lb 11.2 oz)  Body mass index is 32.73 kg/m².    Intake/Output Summary (Last 24 hours) at 10/19/2019 1102  Last data filed at 10/19/2019 0730  Gross per 24 hour    Intake 1432 ml   Output 2950 ml   Net -1518 ml      Physical Exam   Constitutional: She is oriented to person, place, and time. She appears well-developed. No distress.   HENT:   Head: Normocephalic and atraumatic.   Mouth/Throat: Oropharynx is clear and moist.   NGT  O2 per NC   Eyes: Pupils are equal, round, and reactive to light. EOM are normal.   Neck: Normal range of motion.   Pulmonary/Chest: Effort normal.   Abdominal: She exhibits no distension. There is tenderness.   Incisional tenderness  Surgical wounds, dressed c/d/i   Musculoskeletal: Normal range of motion.   Neurological: She is alert and oriented to person, place, and time. No cranial nerve deficit or sensory deficit.   Skin: No rash noted.   Psychiatric: She has a normal mood and affect.   Nursing note and vitals reviewed.      Significant Labs: None    Significant Imaging: no new images      Assessment/Plan:      * Diverticulitis of large intestine without perforation or abscess without bleeding  S/p sigmoidectomy POD 3  Routine post op care.   NGT in place while waiting for bowel function to return.  Pain control, IV Tylenol and Toradol p.r.n.  Antiemetics p.r.n.  IVFs.  Diet advancement and NG removal as per surgery team      Asthma  Chronic condition.  Monitoring.  No acute issues. stable      HLD (hyperlipidemia)  Chronic condition.  No acute issues.  Monitoring.       Hypertension  At goal  Continue home medications via NG       VTE Risk Mitigation (From admission, onward)         Ordered     enoxaparin injection 40 mg  Daily      10/16/19 1127     IP VTE HIGH RISK PATIENT  Once      10/16/19 1127     Place sequential compression device  Until discontinued      10/16/19 1127                      Alessandra Funez DO  Department of Hospital Medicine   [facility]

## 2019-10-19 NOTE — SUBJECTIVE & OBJECTIVE
Interval History: PCA d/c'd by Anesthesia. POD 2  AF VSS, good uop, -flatus, states she passed a blood clot this morning but no stool, NGT inplace. ambulating. Ab pain tolerable with current regimen.   Denies any CP, SOB, N/V/D, headaches, fever or chills.       Review of Systems   Per interval history, all other systems reviewed and negative.     Objective:     Vital Signs (Most Recent):  Temp: 97.8 °F (36.6 °C) (10/19/19 0750)  Pulse: 78 (10/19/19 0750)  Resp: 18 (10/19/19 0750)  BP: 135/87 (10/19/19 0750)  SpO2: 98 % (10/19/19 0750) Vital Signs (24h Range):  Temp:  [97.8 °F (36.6 °C)-98.2 °F (36.8 °C)] 97.8 °F (36.6 °C)  Pulse:  [64-85] 78  Resp:  [17-18] 18  SpO2:  [96 %-100 %] 98 %  BP: (120-135)/(71-87) 135/87     Weight: 86.5 kg (190 lb 11.2 oz)  Body mass index is 32.73 kg/m².    Intake/Output Summary (Last 24 hours) at 10/19/2019 1102  Last data filed at 10/19/2019 0730  Gross per 24 hour   Intake 1432 ml   Output 2950 ml   Net -1518 ml      Physical Exam   Constitutional: She is oriented to person, place, and time. She appears well-developed. No distress.   HENT:   Head: Normocephalic and atraumatic.   Mouth/Throat: Oropharynx is clear and moist.   NGT  O2 per NC   Eyes: Pupils are equal, round, and reactive to light. EOM are normal.   Neck: Normal range of motion.   Pulmonary/Chest: Effort normal.   Abdominal: She exhibits no distension. There is tenderness.   Incisional tenderness  Surgical wounds, dressed c/d/i   Musculoskeletal: Normal range of motion.   Neurological: She is alert and oriented to person, place, and time. No cranial nerve deficit or sensory deficit.   Skin: No rash noted.   Psychiatric: She has a normal mood and affect.   Nursing note and vitals reviewed.      Significant Labs: None    Significant Imaging: no new images

## 2019-10-20 PROBLEM — E87.6 HYPOKALEMIA: Status: ACTIVE | Noted: 2019-10-20

## 2019-10-20 LAB
ALBUMIN SERPL BCP-MCNC: 3.2 G/DL (ref 3.5–5.2)
ALP SERPL-CCNC: 47 U/L (ref 55–135)
ALT SERPL W/O P-5'-P-CCNC: 17 U/L (ref 10–44)
ANION GAP SERPL CALC-SCNC: 10 MMOL/L (ref 8–16)
AST SERPL-CCNC: 30 U/L (ref 10–40)
BASOPHILS # BLD AUTO: 0.01 K/UL (ref 0–0.2)
BASOPHILS NFR BLD: 0.2 % (ref 0–1.9)
BILIRUB SERPL-MCNC: 1.2 MG/DL (ref 0.1–1)
BUN SERPL-MCNC: <5 MG/DL (ref 6–20)
CALCIUM SERPL-MCNC: 7.4 MG/DL (ref 8.7–10.5)
CHLORIDE SERPL-SCNC: 106 MMOL/L (ref 95–110)
CO2 SERPL-SCNC: 22 MMOL/L (ref 23–29)
CREAT SERPL-MCNC: 0.6 MG/DL (ref 0.5–1.4)
DIFFERENTIAL METHOD: ABNORMAL
EOSINOPHIL # BLD AUTO: 0.1 K/UL (ref 0–0.5)
EOSINOPHIL NFR BLD: 1.8 % (ref 0–8)
ERYTHROCYTE [DISTWIDTH] IN BLOOD BY AUTOMATED COUNT: 12 % (ref 11.5–14.5)
EST. GFR  (AFRICAN AMERICAN): >60 ML/MIN/1.73 M^2
EST. GFR  (NON AFRICAN AMERICAN): >60 ML/MIN/1.73 M^2
GLUCOSE SERPL-MCNC: 110 MG/DL (ref 70–110)
HCT VFR BLD AUTO: 28.8 % (ref 37–48.5)
HGB BLD-MCNC: 10.2 G/DL (ref 12–16)
IMM GRANULOCYTES # BLD AUTO: 0.04 K/UL (ref 0–0.04)
IMM GRANULOCYTES NFR BLD AUTO: 0.7 % (ref 0–0.5)
LYMPHOCYTES # BLD AUTO: 1 K/UL (ref 1–4.8)
LYMPHOCYTES NFR BLD: 16.4 % (ref 18–48)
MAGNESIUM SERPL-MCNC: 1.6 MG/DL (ref 1.6–2.6)
MCH RBC QN AUTO: 33.2 PG (ref 27–31)
MCHC RBC AUTO-ENTMCNC: 35.4 G/DL (ref 32–36)
MCV RBC AUTO: 94 FL (ref 82–98)
MONOCYTES # BLD AUTO: 0.3 K/UL (ref 0.3–1)
MONOCYTES NFR BLD: 4.7 % (ref 4–15)
NEUTROPHILS # BLD AUTO: 4.7 K/UL (ref 1.8–7.7)
NEUTROPHILS NFR BLD: 76.2 % (ref 38–73)
NRBC BLD-RTO: 0 /100 WBC
PLATELET # BLD AUTO: 183 K/UL (ref 150–350)
PMV BLD AUTO: 8.9 FL (ref 9.2–12.9)
POTASSIUM SERPL-SCNC: 2.4 MMOL/L (ref 3.5–5.1)
POTASSIUM SERPL-SCNC: 2.8 MMOL/L (ref 3.5–5.1)
PROT SERPL-MCNC: 5.9 G/DL (ref 6–8.4)
RBC # BLD AUTO: 3.07 M/UL (ref 4–5.4)
SODIUM SERPL-SCNC: 138 MMOL/L (ref 136–145)
WBC # BLD AUTO: 6.11 K/UL (ref 3.9–12.7)

## 2019-10-20 PROCEDURE — 80053 COMPREHEN METABOLIC PANEL: CPT

## 2019-10-20 PROCEDURE — 63600175 PHARM REV CODE 636 W HCPCS: Performed by: SURGERY

## 2019-10-20 PROCEDURE — 25000003 PHARM REV CODE 250: Performed by: NURSE PRACTITIONER

## 2019-10-20 PROCEDURE — 83735 ASSAY OF MAGNESIUM: CPT

## 2019-10-20 PROCEDURE — 85025 COMPLETE CBC W/AUTO DIFF WBC: CPT

## 2019-10-20 PROCEDURE — 25000003 PHARM REV CODE 250: Performed by: INTERNAL MEDICINE

## 2019-10-20 PROCEDURE — 25000003 PHARM REV CODE 250: Performed by: SURGERY

## 2019-10-20 PROCEDURE — 84132 ASSAY OF SERUM POTASSIUM: CPT

## 2019-10-20 PROCEDURE — 12000002 HC ACUTE/MED SURGE SEMI-PRIVATE ROOM

## 2019-10-20 PROCEDURE — 36415 COLL VENOUS BLD VENIPUNCTURE: CPT

## 2019-10-20 RX ORDER — LANOLIN ALCOHOL/MO/W.PET/CERES
800 CREAM (GRAM) TOPICAL ONCE
Status: COMPLETED | OUTPATIENT
Start: 2019-10-20 | End: 2019-10-20

## 2019-10-20 RX ORDER — POTASSIUM CHLORIDE 20 MEQ/15ML
40 SOLUTION ORAL
Status: COMPLETED | OUTPATIENT
Start: 2019-10-20 | End: 2019-10-20

## 2019-10-20 RX ORDER — POTASSIUM CHLORIDE 20 MEQ/15ML
40 SOLUTION ORAL ONCE
Status: COMPLETED | OUTPATIENT
Start: 2019-10-20 | End: 2019-10-20

## 2019-10-20 RX ORDER — TRIAMTERENE/HYDROCHLOROTHIAZID 37.5-25 MG
1 TABLET ORAL DAILY
Status: DISCONTINUED | OUTPATIENT
Start: 2019-10-21 | End: 2019-10-21 | Stop reason: HOSPADM

## 2019-10-20 RX ORDER — DIPHENHYDRAMINE HCL 25 MG
25 CAPSULE ORAL NIGHTLY PRN
Status: DISCONTINUED | OUTPATIENT
Start: 2019-10-20 | End: 2019-10-21 | Stop reason: HOSPADM

## 2019-10-20 RX ADMIN — POTASSIUM CHLORIDE 40 MEQ: 20 SOLUTION ORAL at 09:10

## 2019-10-20 RX ADMIN — MAGNESIUM OXIDE 800 MG: 400 TABLET ORAL at 05:10

## 2019-10-20 RX ADMIN — ENOXAPARIN SODIUM 40 MG: 100 INJECTION SUBCUTANEOUS at 05:10

## 2019-10-20 RX ADMIN — HYDROCODONE BITARTRATE AND ACETAMINOPHEN 2 TABLET: 5; 325 TABLET ORAL at 09:10

## 2019-10-20 RX ADMIN — AMLODIPINE BESYLATE 2.5 MG: 2.5 TABLET ORAL at 05:10

## 2019-10-20 RX ADMIN — MAGNESIUM OXIDE 800 MG: 400 TABLET ORAL at 09:10

## 2019-10-20 RX ADMIN — NEBIVOLOL HYDROCHLORIDE 5 MG: 5 TABLET ORAL at 05:10

## 2019-10-20 RX ADMIN — MUPIROCIN 1 G: 20 OINTMENT TOPICAL at 09:10

## 2019-10-20 RX ADMIN — ACETAMINOPHEN 650 MG: 325 TABLET ORAL at 09:10

## 2019-10-20 RX ADMIN — DIPHENHYDRAMINE HYDROCHLORIDE 25 MG: 25 CAPSULE ORAL at 09:10

## 2019-10-20 RX ADMIN — POTASSIUM CHLORIDE 40 MEQ: 20 SOLUTION ORAL at 05:10

## 2019-10-20 RX ADMIN — POTASSIUM CHLORIDE 40 MEQ: 20 SOLUTION ORAL at 06:10

## 2019-10-20 NOTE — PLAN OF CARE
Educated pt on poc, meds, activities, diet. Pt voiding without difficulty, ambulating in hallway, passing flatus. Prn pain meds given as needed per pt request.

## 2019-10-20 NOTE — NURSING
98.2, 153/89, 65,  16.    Pt  Stated  She did not feel well and felt like her her heart was racing. Vss.    Pt  Was reassured her vitals were stable, told we will call Md .  Lab called and gave Samantha HOLDEN  Results of pt's potassium this am .      636 am ...Dr Trujillo  called and spoke with  about the pt , she is aware of potassium level 2.4 this am , stated she will put in order for K po liquid for pt.   She stated someone will see the pt this am . Pt informed and updated .

## 2019-10-20 NOTE — PROGRESS NOTES
NGT clamped  Stanley clears  +flatus/BM    Abd - S/appr TTP    I/P: POD4  D/C NGT  Adv diet as stanley  Plan D/C tomorrow

## 2019-10-20 NOTE — ASSESSMENT & PLAN NOTE
S/p sigmoidectomy POD 4  Routine post op care.   NGT clamped til order to D/C per surgeon.  Pain control, IV Tylenol and Toradol p.r.n.  Antiemetics p.r.n.   No IV-very difficult stick  Tolerating clear liquids  Ambulating on her own

## 2019-10-20 NOTE — PLAN OF CARE
Problem: Adult Inpatient Plan of Care  Goal: Plan of Care Review  Outcome: Ongoing, Progressing  Goal: Patient-Specific Goal (Individualization)  Outcome: Ongoing, Progressing  Goal: Absence of Hospital-Acquired Illness or Injury  Outcome: Ongoing, Progressing  Goal: Optimal Comfort and Wellbeing  Outcome: Ongoing, Progressing  Goal: Readiness for Transition of Care  Outcome: Ongoing, Progressing

## 2019-10-20 NOTE — PROGRESS NOTES
Feeling better today  No N/V  +flatus  Reports some belching    Abd - S/appr TTP    I/P: POD#3  Clamp NGT  Sips of clears

## 2019-10-20 NOTE — PROGRESS NOTES
[facility] Medicine  Progress Note    Patient Name: Daniela Lawson  MRN: [id number]  Patient Class: IP- Inpatient   Admission Date: 10/16/2019  Length of Stay: 4 days  Attending Physician: Elinor Cornelius MD  Primary Care Provider: CHITO Shea,FNP-C        Subjective:     Principal Problem:Diverticulitis of large intestine without perforation or abscess without bleeding        HPI:  54 year old female with PMHx HTN, Asthma admitted s/p sigmoid colectomy for diverticulitis.  Patient reports abdominal pain that is diffuse, sharp, persistent, moderately severe.  Pain currently controlled via PCA pump.  NGT and O2 NC making her nose uncomfortable but tolerable. The SCDs are causing some discomfort to her LE neuropathy.    Overview/Hospital Course:  POD# 4  Feeling better each day  No N/V  +flatus  + small green liquid BM this am  NG clamped  Tolerating clear liquids without nausea  K+ 2.4 @ 0400-40 meq @ 0645 repeated 0930 repeat K+ & Mag 1500 today  Pain 4/10 with movement -request tylenol  Chronic insomnia-request home regimen 25 mg benadryl               Interval History:     Review of Systems   Constitutional: Negative.    HENT: Negative.    Eyes: Negative.    Respiratory: Negative.    Cardiovascular: Positive for palpitations.        Palpations improved with am dose of potassium   Gastrointestinal: Positive for abdominal pain. Negative for nausea and vomiting.   Endocrine: Negative.    Genitourinary: Negative.    Musculoskeletal: Negative.    Skin: Negative.    Allergic/Immunologic: Negative.    Neurological: Negative.    Hematological: Negative.    Psychiatric/Behavioral: Negative.      Objective:     Vital Signs (Most Recent):  Temp: 98.8 °F (37.1 °C) (10/20/19 0800)  Pulse: 72 (10/20/19 0800)  Resp: 18 (10/20/19 0800)  BP: (!) 157/88 (10/20/19 0800)  SpO2: 96 % (10/20/19 0800) Vital Signs (24h Range):  Temp:  [98 °F (36.7 °C)-98.8 °F (37.1 °C)] 98.8 °F (37.1 °C)  Pulse:   [60-75] 72  Resp:  [16-18] 18  SpO2:  [96 %-99 %] 96 %  BP: (128-157)/(78-88) 157/88     Weight: 86.5 kg (190 lb 11.2 oz)  Body mass index is 32.73 kg/m².    Intake/Output Summary (Last 24 hours) at 10/20/2019 0916  Last data filed at 10/20/2019 0300  Gross per 24 hour   Intake 1670 ml   Output 2775 ml   Net -1105 ml      Physical Exam   Constitutional: She is oriented to person, place, and time. She appears well-developed and well-nourished.   HENT:   Head: Normocephalic and atraumatic.   Mouth/Throat: Oropharynx is clear and moist.   Eyes: Pupils are equal, round, and reactive to light. Conjunctivae are normal. No scleral icterus.   Neck: Normal range of motion. Neck supple. No thyromegaly present.   Cardiovascular: Normal rate, regular rhythm and normal heart sounds.   No murmur heard.  Pulmonary/Chest: Effort normal and breath sounds normal. No respiratory distress.   Abdominal: Soft. Bowel sounds are normal. She exhibits distension. There is tenderness. There is no guarding.   Good active BS  NG in place   Musculoskeletal: Normal range of motion. She exhibits no edema.   Neurological: She is alert and oriented to person, place, and time. No sensory deficit. She exhibits normal muscle tone.   Skin: Skin is warm. Capillary refill takes less than 2 seconds. No rash noted.   No IV-per surgeon   Psychiatric: She has a normal mood and affect. Her behavior is normal. Judgment and thought content normal.   Nursing note and vitals reviewed.      Significant Labs: All pertinent labs within the past 24 hours have been reviewed.    Significant Imaging: I have reviewed all pertinent imaging results/findings within the past 24 hours.      Assessment/Plan:      * Diverticulitis of large intestine without perforation or abscess without bleeding  S/p sigmoidectomy POD 4  Routine post op care.   NGT clamped til order to D/C per surgeon.  Pain control, IV Tylenol and Toradol p.r.n.  Antiemetics p.r.n.   No IV-very difficult  stick  Tolerating clear liquids  Ambulating on her own      Hypokalemia  POD-#4 10/20/19  K+ 2.4 @ 0400-40 meq @ 0645 repeated 0930 repeat K+ & Mag 1500 today  Symptomatic with palpations yet improved after first dose of K+    Asthma  Chronic condition.  Monitoring.  No acute issues. stable      HLD (hyperlipidemia)  Chronic condition.  No acute issues.  Monitoring.       Hypertension  At goal  Continue home medications via oral      VTE Risk Mitigation (From admission, onward)         Ordered     enoxaparin injection 40 mg  Daily      10/16/19 1127     IP VTE HIGH RISK PATIENT  Once      10/16/19 1127     Place sequential compression device  Until discontinued      10/16/19 1127                      Alis Stein NP  Department of Hospital Medicine   [facility]

## 2019-10-20 NOTE — NURSING
Pt's IV infiltrated tonight at 2238. IV was discontinued....after dose of toradol iv was given prior . Pt stated it was hurting . She wanted it removed .   Pt was stuck x 2 for a new iv per Mary Ellen JUAN RN from L/D with 20g  And 22 g . Both had blood return but then blew.  Mary Ellen stated pt has a lot of valves and not much left now for IV's except  LT AC . Pt stated she did not want to be stuck anymore , did not want the IV back in tonight, and for us to not call anesthesia for IV placement . Told pt I would have to call her MD  And share her wishes and  Get an ok from them. ...............abdominal tenderness 1154 ... On call ans service was called , spoke to Lore, and knew I needed to speak with the physician.   0035..... Another call was made to on call service.  0040... Dr Galvez called unit and was told what pt requested . MD stated that was ok to keep IV out for now, will reevaluate it in the morning. .

## 2019-10-20 NOTE — ASSESSMENT & PLAN NOTE
POD-#4 10/20/19  K+ 2.4 @ 0400-40 meq @ 0645 repeated 0930 repeat K+ & Mag 1500 today  Symptomatic with palpations yet improved after first dose of K+

## 2019-10-20 NOTE — SUBJECTIVE & OBJECTIVE
Interval History:     Review of Systems   Constitutional: Negative.    HENT: Negative.    Eyes: Negative.    Respiratory: Negative.    Cardiovascular: Positive for palpitations.        Palpations improved with am dose of potassium   Gastrointestinal: Positive for abdominal pain. Negative for nausea and vomiting.   Endocrine: Negative.    Genitourinary: Negative.    Musculoskeletal: Negative.    Skin: Negative.    Allergic/Immunologic: Negative.    Neurological: Negative.    Hematological: Negative.    Psychiatric/Behavioral: Negative.      Objective:     Vital Signs (Most Recent):  Temp: 98.8 °F (37.1 °C) (10/20/19 0800)  Pulse: 72 (10/20/19 0800)  Resp: 18 (10/20/19 0800)  BP: (!) 157/88 (10/20/19 0800)  SpO2: 96 % (10/20/19 0800) Vital Signs (24h Range):  Temp:  [98 °F (36.7 °C)-98.8 °F (37.1 °C)] 98.8 °F (37.1 °C)  Pulse:  [60-75] 72  Resp:  [16-18] 18  SpO2:  [96 %-99 %] 96 %  BP: (128-157)/(78-88) 157/88     Weight: 86.5 kg (190 lb 11.2 oz)  Body mass index is 32.73 kg/m².    Intake/Output Summary (Last 24 hours) at 10/20/2019 0916  Last data filed at 10/20/2019 0300  Gross per 24 hour   Intake 1670 ml   Output 2775 ml   Net -1105 ml      Physical Exam   Constitutional: She is oriented to person, place, and time. She appears well-developed and well-nourished.   HENT:   Head: Normocephalic and atraumatic.   Mouth/Throat: Oropharynx is clear and moist.   Eyes: Pupils are equal, round, and reactive to light. Conjunctivae are normal. No scleral icterus.   Neck: Normal range of motion. Neck supple. No thyromegaly present.   Cardiovascular: Normal rate, regular rhythm and normal heart sounds.   No murmur heard.  Pulmonary/Chest: Effort normal and breath sounds normal. No respiratory distress.   Abdominal: Soft. Bowel sounds are normal. She exhibits distension. There is tenderness. There is no guarding.   Good active BS  NG in place   Musculoskeletal: Normal range of motion. She exhibits no edema.   Neurological: She  is alert and oriented to person, place, and time. No sensory deficit. She exhibits normal muscle tone.   Skin: Skin is warm. Capillary refill takes less than 2 seconds. No rash noted.   No IV-per surgeon   Psychiatric: She has a normal mood and affect. Her behavior is normal. Judgment and thought content normal.   Nursing note and vitals reviewed.      Significant Labs: All pertinent labs within the past 24 hours have been reviewed.    Significant Imaging: I have reviewed all pertinent imaging results/findings within the past 24 hours.

## 2019-10-21 VITALS
HEIGHT: 64 IN | SYSTOLIC BLOOD PRESSURE: 141 MMHG | OXYGEN SATURATION: 95 % | HEART RATE: 67 BPM | RESPIRATION RATE: 18 BRPM | BODY MASS INDEX: 32.56 KG/M2 | DIASTOLIC BLOOD PRESSURE: 91 MMHG | WEIGHT: 190.69 LBS | TEMPERATURE: 98 F

## 2019-10-21 LAB
ANION GAP SERPL CALC-SCNC: 9 MMOL/L (ref 8–16)
BUN SERPL-MCNC: <5 MG/DL (ref 6–20)
CALCIUM SERPL-MCNC: 8.3 MG/DL (ref 8.7–10.5)
CHLORIDE SERPL-SCNC: 108 MMOL/L (ref 95–110)
CO2 SERPL-SCNC: 24 MMOL/L (ref 23–29)
CREAT SERPL-MCNC: 0.6 MG/DL (ref 0.5–1.4)
ERYTHROCYTE [DISTWIDTH] IN BLOOD BY AUTOMATED COUNT: 12.4 % (ref 11.5–14.5)
EST. GFR  (AFRICAN AMERICAN): >60 ML/MIN/1.73 M^2
EST. GFR  (NON AFRICAN AMERICAN): >60 ML/MIN/1.73 M^2
GLUCOSE SERPL-MCNC: 127 MG/DL (ref 70–110)
HCT VFR BLD AUTO: 30 % (ref 37–48.5)
HGB BLD-MCNC: 10.4 G/DL (ref 12–16)
MAGNESIUM SERPL-MCNC: 1.6 MG/DL (ref 1.6–2.6)
MCH RBC QN AUTO: 32.9 PG (ref 27–31)
MCHC RBC AUTO-ENTMCNC: 34.7 G/DL (ref 32–36)
MCV RBC AUTO: 95 FL (ref 82–98)
PLATELET # BLD AUTO: 204 K/UL (ref 150–350)
PMV BLD AUTO: 9 FL (ref 9.2–12.9)
POTASSIUM SERPL-SCNC: 3.4 MMOL/L (ref 3.5–5.1)
RBC # BLD AUTO: 3.16 M/UL (ref 4–5.4)
SODIUM SERPL-SCNC: 141 MMOL/L (ref 136–145)
WBC # BLD AUTO: 4.38 K/UL (ref 3.9–12.7)

## 2019-10-21 PROCEDURE — 36415 COLL VENOUS BLD VENIPUNCTURE: CPT

## 2019-10-21 PROCEDURE — 25000003 PHARM REV CODE 250: Performed by: STUDENT IN AN ORGANIZED HEALTH CARE EDUCATION/TRAINING PROGRAM

## 2019-10-21 PROCEDURE — 85027 COMPLETE CBC AUTOMATED: CPT

## 2019-10-21 PROCEDURE — 25000003 PHARM REV CODE 250

## 2019-10-21 PROCEDURE — 80048 BASIC METABOLIC PNL TOTAL CA: CPT

## 2019-10-21 PROCEDURE — 83735 ASSAY OF MAGNESIUM: CPT

## 2019-10-21 PROCEDURE — 25000003 PHARM REV CODE 250: Performed by: INTERNAL MEDICINE

## 2019-10-21 RX ORDER — POTASSIUM CHLORIDE 20 MEQ/1
40 TABLET, EXTENDED RELEASE ORAL ONCE
Status: COMPLETED | OUTPATIENT
Start: 2019-10-21 | End: 2019-10-21

## 2019-10-21 RX ORDER — LANOLIN ALCOHOL/MO/W.PET/CERES
400 CREAM (GRAM) TOPICAL EVERY 4 HOURS
Status: DISCONTINUED | OUTPATIENT
Start: 2019-10-21 | End: 2019-10-21 | Stop reason: HOSPADM

## 2019-10-21 RX ADMIN — TRIAMTERENE AND HYDROCHLOROTHIAZIDE 1 TABLET: 37.5; 25 TABLET ORAL at 08:10

## 2019-10-21 RX ADMIN — CETIRIZINE HYDROCHLORIDE 10 MG: 10 TABLET, FILM COATED ORAL at 08:10

## 2019-10-21 RX ADMIN — MUPIROCIN 1 G: 20 OINTMENT TOPICAL at 09:10

## 2019-10-21 RX ADMIN — MAGNESIUM OXIDE 400 MG: 400 TABLET ORAL at 08:10

## 2019-10-21 RX ADMIN — ACETAMINOPHEN 650 MG: 325 TABLET ORAL at 08:10

## 2019-10-21 RX ADMIN — POTASSIUM CHLORIDE 40 MEQ: 20 TABLET, EXTENDED RELEASE ORAL at 08:10

## 2019-10-21 NOTE — DISCHARGE SUMMARY
Date of admission is 01/16/2019    Date of discharge is  01/21/2019    Discharge diagnosis:  1.  Recurrent acute diverticulitis 2.  Postoperative hypokalemia    Hospital course:    If patient admitted to the hospital for a laparoscopic assisted sigmoid colectomy for above diagnosis.  Underwent uneventful surgery on the day of admission.  Postoperatively had an uneventful course.  Postoperative hypokalemia was noted and treated.  On the day of discharge patient was tolerating regular diet.  Full verbal and written instructions given to patient.  Follow-up appointment in my office 1 week post discharge.

## 2019-10-21 NOTE — CONSULTS
ELECTROLYTE MANAGEMENT PROGRESS NOTE    Objective:  54 y.o., female, Actual Body Weight = 86.5 kg (190 lb 11.2 oz)    Recent Labs     10/20/19  0355 10/20/19  1448 10/21/19  0420   CREATININE 0.6  --  0.6   K 2.4* 2.8* 3.4*   MG  --  1.6 1.6   CALCIUM 7.4*  --  8.3*   ALBUMIN 3.2*  --   --      Diet [place]    IV Access:  None    Assessment:  Electrolyte Deficiency:  Hypokalemia (K+ < 4.0) and Hypomagnesemia (Mg < 1.9)  Calcium corrected for low albumin = 8.94    Patient type:  Acute Care (not in an ICU)    Plan:  Replace magnesium with Magnesium oxide 400mg PO every 4 hours x  2 doses.  Replace potassium with Potassium chloride 40 mEq PO x 1 dose.     Will recheck serum potassium and magnesium levels later today after completion of ORAL administration of potassium and magnesium to determine need for further supplementation.  Will monitor electrolytes daily.    Thank you for allowing us to participate in this patient's care.     Macy Arredondo PharmD 10/21/2019 7:42 AM  ID Clinical Pharmacist, Ext [phone] or [phone]

## 2019-10-21 NOTE — DISCHARGE INSTRUCTIONS
Follow verbal and written instructions provided by Dr Thornton.    Return to emergency room for any chest pain, shortness of breath or dizziness.

## 2019-10-21 NOTE — PLAN OF CARE
Pt with adequate pain control with po medication, tolerating diet, ambulating without difficulty, dc complete, nad noted

## 2019-10-21 NOTE — PLAN OF CARE
Problem: Adult Inpatient Plan of Care  Goal: Plan of Care Review  Outcome: Ongoing, Progressing  Goal: Patient-Specific Goal (Individualization)  Outcome: Ongoing, Progressing  Goal: Absence of Hospital-Acquired Illness or Injury  Outcome: Ongoing, Progressing  Goal: Optimal Comfort and Wellbeing  Outcome: Ongoing, Progressing  Goal: Readiness for Transition of Care  Outcome: Ongoing, Progressing  Goal: Rounds/Family Conference  Outcome: Ongoing, Progressing

## 2019-10-24 ENCOUNTER — OFFICE VISIT (OUTPATIENT)
Dept: SURGERY | Facility: CLINIC | Age: 54
End: 2019-10-24
Payer: COMMERCIAL

## 2019-10-24 VITALS
TEMPERATURE: 98 F | SYSTOLIC BLOOD PRESSURE: 124 MMHG | HEIGHT: 64 IN | DIASTOLIC BLOOD PRESSURE: 86 MMHG | HEART RATE: 65 BPM | WEIGHT: 190 LBS | BODY MASS INDEX: 32.44 KG/M2

## 2019-10-24 DIAGNOSIS — K57.32 DIVERTICULITIS OF LARGE INTESTINE WITHOUT PERFORATION OR ABSCESS WITHOUT BLEEDING: Primary | ICD-10-CM

## 2019-10-24 PROCEDURE — 99024 POSTOP FOLLOW-UP VISIT: CPT | Mod: S$GLB,,, | Performed by: SURGERY

## 2019-10-24 PROCEDURE — 99024 PR POST-OP FOLLOW-UP VISIT: ICD-10-PCS | Mod: S$GLB,,, | Performed by: SURGERY

## 2019-10-24 NOTE — PROGRESS NOTES
Subjective:       Patient ID: Daniela Lawson is a 54 y.o. female.    Chief Complaint: Post-op Evaluation (FU DOS 10/16/19 L/S Sigmoid colectomy w/ low pelvic anastomosis )      HPI:  Daniela Lawson is here for post-op. Patient has no systemic complaints. Post operative   pain is under control.  Tolerating diet, no nausea/vomiting.  Having normal bowel movements.        Objective:      Physical Exam   Constitutional: She is oriented to person, place, and time. She is cooperative. No distress.   Abdominal: Soft. She exhibits no distension. There is no tenderness. There is no rebound and no guarding.   Neurological: She is oriented to person, place, and time.   Skin:   Incisions are clean, dry and intact  There is no evidence of infection, hematoma or seroma        Assessment/Plan:   Diverticulitis of large intestine without perforation or abscess without bleeding      Doing well post op      Follow up if symptoms worsen or fail to improve.

## 2019-10-24 NOTE — LETTER
October 24, 2019      Guy Clements MD  [address]  [place] [address]           [facility]  [address]  [place] [address]  Phone: [phone]  Fax: [phone]          Patient: Daniela Lawson   MR Number: [id number]   YOB: 1965   Date of Visit: 10/24/2019       Dear Dr. Guy Robersonor:    Thank you for referring Daniela Lawson to me for evaluation. Attached you will find relevant portions of my assessment and plan of care.    If you have questions, please do not hesitate to call me. I look forward to following Daniela Lawson along with you.    Sincerely,    Doug Gifford MD    Enclosure  CC:  No Recipients    If you would like to receive this communication electronically, please contact [email or web address] or [phone] to request more information on [facility] access.    For providers and/or their staff who would like to refer a patient to [facility], please contact us through our one-stop-shop provider referral line, [facility], at [phone].    If you feel you have received this communication in error or would no longer like to receive these types of communications, please e-mail [email or web address]

## 2019-10-28 ENCOUNTER — OFFICE VISIT (OUTPATIENT)
Dept: FAMILY MEDICINE | Facility: CLINIC | Age: 54
End: 2019-10-28
Payer: COMMERCIAL

## 2019-10-28 VITALS
RESPIRATION RATE: 18 BRPM | BODY MASS INDEX: 31.04 KG/M2 | WEIGHT: 181.81 LBS | HEIGHT: 64 IN | HEART RATE: 72 BPM | DIASTOLIC BLOOD PRESSURE: 70 MMHG | SYSTOLIC BLOOD PRESSURE: 116 MMHG | OXYGEN SATURATION: 97 %

## 2019-10-28 DIAGNOSIS — I10 HYPERTENSION, UNSPECIFIED TYPE: ICD-10-CM

## 2019-10-28 DIAGNOSIS — E78.1 PURE HYPERTRIGLYCERIDEMIA: ICD-10-CM

## 2019-10-28 DIAGNOSIS — E87.6 HYPOKALEMIA: ICD-10-CM

## 2019-10-28 DIAGNOSIS — Z09 HOSPITAL DISCHARGE FOLLOW-UP: Primary | ICD-10-CM

## 2019-10-28 DIAGNOSIS — D64.9 LOW HEMOGLOBIN AND LOW HEMATOCRIT: ICD-10-CM

## 2019-10-28 DIAGNOSIS — Z23 NEED FOR INFLUENZA VACCINATION: ICD-10-CM

## 2019-10-28 DIAGNOSIS — R79.89 LOW SERUM CALCIUM: ICD-10-CM

## 2019-10-28 PROCEDURE — 99214 PR OFFICE/OUTPT VISIT, EST, LEVL IV, 30-39 MIN: ICD-10-PCS | Mod: 25,S$GLB,, | Performed by: NURSE PRACTITIONER

## 2019-10-28 PROCEDURE — 3008F PR BODY MASS INDEX (BMI) DOCUMENTED: ICD-10-PCS | Mod: S$GLB,,, | Performed by: NURSE PRACTITIONER

## 2019-10-28 PROCEDURE — 90686 IIV4 VACC NO PRSV 0.5 ML IM: CPT | Mod: S$GLB,,, | Performed by: NURSE PRACTITIONER

## 2019-10-28 PROCEDURE — 1111F DSCHRG MED/CURRENT MED MERGE: CPT | Mod: S$GLB,,, | Performed by: NURSE PRACTITIONER

## 2019-10-28 PROCEDURE — 1111F PR DISCHARGE MEDS RECONCILED W/ CURRENT OUTPATIENT MED LIST: ICD-10-PCS | Mod: S$GLB,,, | Performed by: NURSE PRACTITIONER

## 2019-10-28 PROCEDURE — 3008F BODY MASS INDEX DOCD: CPT | Mod: S$GLB,,, | Performed by: NURSE PRACTITIONER

## 2019-10-28 PROCEDURE — 90471 FLU VACCINE (QUAD) GREATER THAN OR EQUAL TO 3YO PRESERVATIVE FREE IM: ICD-10-PCS | Mod: S$GLB,,, | Performed by: NURSE PRACTITIONER

## 2019-10-28 PROCEDURE — 90471 IMMUNIZATION ADMIN: CPT | Mod: S$GLB,,, | Performed by: NURSE PRACTITIONER

## 2019-10-28 PROCEDURE — 90686 FLU VACCINE (QUAD) GREATER THAN OR EQUAL TO 3YO PRESERVATIVE FREE IM: ICD-10-PCS | Mod: S$GLB,,, | Performed by: NURSE PRACTITIONER

## 2019-10-28 PROCEDURE — 99214 OFFICE O/P EST MOD 30 MIN: CPT | Mod: 25,S$GLB,, | Performed by: NURSE PRACTITIONER

## 2019-10-28 RX ORDER — ICOSAPENT ETHYL 1000 MG/1
2 CAPSULE ORAL 2 TIMES DAILY
Qty: 56 CAPSULE | Refills: 0 | COMMUNITY
Start: 2019-10-28 | End: 2020-04-22 | Stop reason: ALTCHOICE

## 2019-10-30 RX ORDER — ICOSAPENT ETHYL 1000 MG/1
2 CAPSULE ORAL 2 TIMES DAILY
Qty: 360 CAPSULE | Refills: 1 | Status: SHIPPED | OUTPATIENT
Start: 2019-10-30 | End: 2020-04-22 | Stop reason: SDUPTHER

## 2019-10-30 NOTE — PROGRESS NOTES
"    SUBJECTIVE:      Patient ID: Daniela Lawson is a 54 y.o. female.    Chief Complaint: Hospital Follow Up    Follow-up for lab review and hospital discharge - total cholesterol 143, trigs 341, HDL 27, LDL 47    10/16 5-night [facility] hospital stay for sigmoid colectomy r/t increased frequency of diverticulitis flares, she states 12" of colon removed, has since followed up with general surgery and GI, she has been released from care via gen surg and told she can resume a normal diet as tolerated, she states there are certain foods her stomach still cannot handle, she has been feeling fatigued as of late, labs reviewed from stay reveal low H & H, hypokalemia & hypocalcemia (will repeat labs)     present at visit and contributing somewhat to her hospital stay questions    Hypertension   This is a chronic problem. The current episode started more than 1 year ago. The problem is controlled. Pertinent negatives include no chest pain, headaches, neck pain, palpitations or shortness of breath. Risk factors for coronary artery disease include dyslipidemia, obesity, post-menopausal state, sedentary lifestyle and family history. Past treatments include beta blockers, calcium channel blockers and diuretics. The current treatment provides significant improvement. Compliance problems include exercise and diet.    Hyperlipidemia   This is a chronic problem. The current episode started more than 1 month ago. The problem is controlled. Recent lipid tests were reviewed and are high (high trigs). Exacerbating diseases include obesity. She has no history of diabetes. Factors aggravating her hyperlipidemia include beta blockers and thiazides. Pertinent negatives include no chest pain, myalgias or shortness of breath. Treatments tried: krill oil. The current treatment provides mild improvement of lipids.       Past Surgical History:   Procedure Laterality Date     SECTION  1990    CHOLECYSTECTOMY      HYSTERECTOMY  " 2003    INSERTION OF URETERAL CATHETER N/A 10/16/2019    Procedure: INSERTION, CATHETER, URETER;  Surgeon: Willard Ruffin MD;  Location: [facility] OR;  Service: Urology;  Laterality: N/A;    LAPAROSCOPIC SIGMOIDECTOMY N/A 10/16/2019    Procedure: COLECTOMY, SIGMOID, LAPAROSCOPIC;  Surgeon: Doug Gifford MD;  Location: [facility] OR;  Service: General;  Laterality: N/A;    SHOULDER ARTHROSCOPY Bilateral     STOMACH SURGERY      Possible Nissen per pt- To treat acid reflux     TUBAL LIGATION  07/27/1990     Family History   Problem Relation Age of Onset    Coronary artery disease Mother     Hyperlipidemia Mother     Other Mother         Breast Nodules    Arthritis Mother     Hypertension Father     Atrial fibrillation Father     Neuropathy Father     Other Father         Bypass    Diabetes Father     Hypertension Sister     Cancer Sister         Breast Cacner    Neuropathy Sister     Hypertension Daughter     No Known Problems Son     Diabetes Maternal Grandmother     Hypertension Maternal Grandmother     No Known Problems Maternal Grandfather     Coronary artery disease Paternal Grandmother     Cancer Paternal Grandmother         Breast Cancer    Heart attack Paternal Grandmother     Dementia Paternal Grandfather     Heart attack Paternal Grandfather     No Known Problems Sister     Other Daughter         Benign Hypertension(Brain)    Breast cancer Maternal Aunt     Breast cancer Paternal Aunt       Social History     Socioeconomic History    Marital status:      Spouse name: Not on file    Number of children: Not on file    Years of education: Not on file    Highest education level: Not on file   Occupational History    Not on file   Social Needs    Financial resource strain: Not very hard    Food insecurity:     Worry: Never true     Inability: Never true    Transportation needs:     Medical: No     Non-medical: No   Tobacco Use    Smoking status: Never Smoker     Smokeless tobacco: Never Used   Substance and Sexual Activity    Alcohol use: Yes     Alcohol/week: 3.0 standard drinks     Types: 3 Glasses of wine per week     Frequency: Monthly or less     Drinks per session: 1 or 2     Binge frequency: Never     Comment: 3 glasses of wine per week     Drug use: Never    Sexual activity: Not on file   Lifestyle    Physical activity:     Days per week: 2 days     Minutes per session: 30 min    Stress: Only a little   Relationships    Social connections:     Talks on phone: More than three times a week     Gets together: Once a week     Attends Denominational service: Not on file     Active member of club or organization: Yes     Attends meetings of clubs or organizations: 1 to 4 times per year     Relationship status:    Other Topics Concern    Not on file   Social History Narrative    Not on file     Current Outpatient Medications   Medication Sig Dispense Refill    ALBUTEROL INHL Inhale 2 puffs into the lungs once a week.      amLODIPine (NORVASC) 2.5 MG tablet Take 1 tablet (2.5 mg total) by mouth Daily. 90 tablet 3    cetirizine 10 mg chewable tablet Take 10 mg by mouth once daily.      cholecalciferol, vitamin D3, (VITAMIN D3) 5,000 unit Tab 5,000 Units once daily.       cyanocobalamin, vitamin B-12, 5,000 mcg TbDL Take 5,000 Units by mouth once daily.       FLAXSEED OIL ORAL Take 1,300 mg by mouth Daily.      glucosamine HCl/chondroitin pradhan (GLUCOSAMINE-CHONDROITIN ORAL) Take 1 tablet by mouth once daily.      krill oil 500 mg Cap Take 500 mg by mouth once daily.       magnesium oxide 500 mg Cap Take 1 capsule by mouth once daily.      multivit-min/iron/folic/lutein (MULTIVITAMIN WOMEN 50 PLUS ORAL) Take 1 tablet by mouth Daily.      nebivolol (BYSTOLIC) 5 MG Tab Take 1 tablet (5 mg total) by mouth Daily. 90 tablet 3    salmeterol (SEREVENT) 50 mcg/dose diskus inhaler Inhale 1 puff into the lungs once daily. Controller       triamterene-hydrochlorothiazide 37.5-25 mg (MAXZIDE-25MG) 37.5-25 mg per tablet Take 1 tablet by mouth once daily. 90 tablet 3    turmeric root extract 500 mg Cap Take 500 mg by mouth once daily.       icosapent ethyl (VASCEPA) 1 gram Cap Take 2 g by mouth 2 (two) times daily. 56 capsule 0     No current facility-administered medications for this visit.      Review of patient's allergies indicates:   Allergen Reactions    Lisinopril Other (See Comments)     Chest pain    Meloxicam     Meperidine     Oxycodone-acetaminophen       Past Medical History:   Diagnosis Date    Anesthesia 2009    Pt reports severe drop in BP when anesthesia given.    Asthma     Diverticulitis     GERD (gastroesophageal reflux disease)     Hypertension     Neuropathy      Past Surgical History:   Procedure Laterality Date     SECTION  1990    CHOLECYSTECTOMY      HYSTERECTOMY      INSERTION OF URETERAL CATHETER N/A 10/16/2019    Procedure: INSERTION, CATHETER, URETER;  Surgeon: Willard Ruffin MD;  Location: [facility] OR;  Service: Urology;  Laterality: N/A;    LAPAROSCOPIC SIGMOIDECTOMY N/A 10/16/2019    Procedure: COLECTOMY, SIGMOID, LAPAROSCOPIC;  Surgeon: Doug Gifford MD;  Location: [facility] OR;  Service: General;  Laterality: N/A;    SHOULDER ARTHROSCOPY Bilateral     STOMACH SURGERY      Possible Nissen per pt- To treat acid reflux     TUBAL LIGATION  1990       Review of Systems   Constitutional: Positive for appetite change (reduced) and fatigue. Negative for activity change and unexpected weight change.   HENT: Negative for congestion, ear pain, hearing loss, postnasal drip, rhinorrhea, sinus pressure, sinus pain, sneezing, sore throat and trouble swallowing.    Eyes: Negative for photophobia, pain, discharge and visual disturbance.   Respiratory: Negative for cough, chest tightness, shortness of breath and wheezing.    Cardiovascular: Negative for chest pain, palpitations and leg  "swelling.   Gastrointestinal: Positive for abdominal pain (intermittent with certain foods) and diarrhea (intermittent). Negative for abdominal distention, blood in stool, constipation, nausea and vomiting.   Endocrine: Negative for cold intolerance, heat intolerance, polydipsia and polyuria.   Genitourinary: Negative for difficulty urinating, dysuria, flank pain, frequency, hematuria, menstrual problem, pelvic pain and urgency.   Musculoskeletal: Negative for arthralgias, back pain, joint swelling, myalgias and neck pain.   Skin: Negative for pallor.   Allergic/Immunologic: Negative for environmental allergies and food allergies.   Neurological: Negative for dizziness, weakness, light-headedness, numbness and headaches.   Hematological: Does not bruise/bleed easily.   Psychiatric/Behavioral: Negative for agitation, confusion, decreased concentration, dysphoric mood and sleep disturbance. The patient is not nervous/anxious.       OBJECTIVE:      Vitals:    10/28/19 1325   BP: 116/70   Pulse: 72   Resp: 18   SpO2: 97%   Weight: 82.5 kg (181 lb 12.8 oz)   Height: 5' 4" (1.626 m)     Physical Exam   Constitutional: She is oriented to person, place, and time. Vital signs are normal. She appears well-developed and well-nourished. No distress.   Obese - 6# weight loss last visit 9/3   HENT:   Head: Normocephalic and atraumatic.   Right Ear: Hearing normal.   Left Ear: Hearing normal.   Nose: Nose normal. No rhinorrhea.   Mouth/Throat: Mucous membranes are normal.   Eyes: Pupils are equal, round, and reactive to light. Conjunctivae and lids are normal. Right eye exhibits no discharge. Left eye exhibits no discharge. Right conjunctiva is not injected. Left conjunctiva is not injected. Right pupil is round and reactive. Left pupil is round and reactive. Pupils are equal.   Neck: Trachea normal and normal range of motion. Neck supple. No JVD present. No tracheal deviation present. No thyromegaly present.   Cardiovascular: " Normal rate, regular rhythm, normal heart sounds and intact distal pulses. Exam reveals no gallop and no friction rub.   No murmur heard.  Pulses:       Radial pulses are 2+ on the right side, and 2+ on the left side.   Pulmonary/Chest: Effort normal and breath sounds normal. No stridor. No respiratory distress. She has no decreased breath sounds. She has no wheezes. She has no rhonchi. She has no rales.   Abdominal: Soft. Bowel sounds are normal. She exhibits no distension. There is no tenderness. There is no rigidity and no guarding.       Musculoskeletal: Normal range of motion. She exhibits no edema.   Lymphadenopathy:     She has no cervical adenopathy.   Neurological: She is alert and oriented to person, place, and time. She has normal strength. She displays no atrophy. She displays a negative Romberg sign. Coordination and gait normal.   Skin: Skin is warm and dry. Capillary refill takes less than 2 seconds. No lesion and no rash noted. No cyanosis. No pallor.   Psychiatric: She has a normal mood and affect. Her speech is normal and behavior is normal. Judgment and thought content normal. Cognition and memory are normal. She is attentive.   Nursing note and vitals reviewed.     Assessment:       1. Hospital discharge follow-up    2. Low hemoglobin and low hematocrit    3. Low serum calcium    4. Hyperlipidemia, unspecified hyperlipidemia type    5. Need for influenza vaccination    6. Hypokalemia    7. Hypertension, unspecified type        Plan:       Hospital discharge follow-up        - med reconciliation performed at this visit: norco 7.5 (taking at bedtime PRN), no other changes    Low hemoglobin and low hematocrit  -     CBC auto differential; Future; Expected date: 11/04/2019    Low serum calcium  -     Basic metabolic panel; Future; Expected date: 10/28/2019    Hyperlipidemia, unspecified hyperlipidemia type  -     icosapent ethyl (VASCEPA) 1 gram Cap; Take 2 g by mouth 2 (two) times daily.  Dispense:  56 capsule; Refill: 0  -     icosapent ethyl (VASCEPA) 1 gram Cap; Take 2 g by mouth 2 (two) times daily.  Dispense: 360 capsule; Refill: 1    Need for influenza vaccination  -     Influenza - Quadrivalent (PF)    Hypokalemia  -     Basic metabolic panel; Future; Expected date: 10/28/2019    Hypertension, unspecified type        - stable on current med        Follow up in about 6 months (around 4/28/2020) for annual wellness.      10/30/2019 CHITO Shea, FNP-C

## 2019-11-01 ENCOUNTER — LAB VISIT (OUTPATIENT)
Dept: LAB | Facility: HOSPITAL | Age: 54
End: 2019-11-01
Attending: NURSE PRACTITIONER
Payer: COMMERCIAL

## 2019-11-01 DIAGNOSIS — R79.89 LOW SERUM CALCIUM: ICD-10-CM

## 2019-11-01 DIAGNOSIS — E87.6 HYPOKALEMIA: ICD-10-CM

## 2019-11-01 DIAGNOSIS — D64.9 LOW HEMOGLOBIN AND LOW HEMATOCRIT: ICD-10-CM

## 2019-11-01 DIAGNOSIS — Z11.59 NEED FOR HEPATITIS C SCREENING TEST: ICD-10-CM

## 2019-11-01 LAB
ANION GAP SERPL CALC-SCNC: 11 MMOL/L (ref 8–16)
BASOPHILS # BLD AUTO: 0.02 K/UL (ref 0–0.2)
BASOPHILS NFR BLD: 0.5 % (ref 0–1.9)
BUN SERPL-MCNC: 15 MG/DL (ref 6–20)
CALCIUM SERPL-MCNC: 9.5 MG/DL (ref 8.7–10.5)
CHLORIDE SERPL-SCNC: 101 MMOL/L (ref 95–110)
CO2 SERPL-SCNC: 27 MMOL/L (ref 23–29)
CREAT SERPL-MCNC: 0.7 MG/DL (ref 0.5–1.4)
DIFFERENTIAL METHOD: ABNORMAL
EOSINOPHIL # BLD AUTO: 0.2 K/UL (ref 0–0.5)
EOSINOPHIL NFR BLD: 5.6 % (ref 0–8)
ERYTHROCYTE [DISTWIDTH] IN BLOOD BY AUTOMATED COUNT: 11.9 % (ref 11.5–14.5)
EST. GFR  (AFRICAN AMERICAN): >60 ML/MIN/1.73 M^2
EST. GFR  (NON AFRICAN AMERICAN): >60 ML/MIN/1.73 M^2
GLUCOSE SERPL-MCNC: 99 MG/DL (ref 70–110)
HCT VFR BLD AUTO: 37.4 % (ref 37–48.5)
HGB BLD-MCNC: 12.6 G/DL (ref 12–16)
IMM GRANULOCYTES # BLD AUTO: 0.01 K/UL (ref 0–0.04)
IMM GRANULOCYTES NFR BLD AUTO: 0.3 % (ref 0–0.5)
LYMPHOCYTES # BLD AUTO: 1.6 K/UL (ref 1–4.8)
LYMPHOCYTES NFR BLD: 40.2 % (ref 18–48)
MCH RBC QN AUTO: 32.5 PG (ref 27–31)
MCHC RBC AUTO-ENTMCNC: 33.7 G/DL (ref 32–36)
MCV RBC AUTO: 96 FL (ref 82–98)
MONOCYTES # BLD AUTO: 0.3 K/UL (ref 0.3–1)
MONOCYTES NFR BLD: 6.9 % (ref 4–15)
NEUTROPHILS # BLD AUTO: 1.8 K/UL (ref 1.8–7.7)
NEUTROPHILS NFR BLD: 46.5 % (ref 38–73)
NRBC BLD-RTO: 0 /100 WBC
PLATELET # BLD AUTO: 257 K/UL (ref 150–350)
PMV BLD AUTO: 9.4 FL (ref 9.2–12.9)
POTASSIUM SERPL-SCNC: 4 MMOL/L (ref 3.5–5.1)
RBC # BLD AUTO: 3.88 M/UL (ref 4–5.4)
SODIUM SERPL-SCNC: 139 MMOL/L (ref 136–145)
WBC # BLD AUTO: 3.91 K/UL (ref 3.9–12.7)

## 2019-11-01 PROCEDURE — 86803 HEPATITIS C AB TEST: CPT

## 2019-11-01 PROCEDURE — 36415 COLL VENOUS BLD VENIPUNCTURE: CPT

## 2019-11-01 PROCEDURE — 85025 COMPLETE CBC W/AUTO DIFF WBC: CPT

## 2019-11-01 PROCEDURE — 80048 BASIC METABOLIC PNL TOTAL CA: CPT

## 2019-11-02 LAB — HCV AB S/CO SERPL IA: <0.1 S/CO RATIO (ref 0–0.9)

## 2019-11-05 ENCOUNTER — TELEPHONE (OUTPATIENT)
Dept: FAMILY MEDICINE | Facility: CLINIC | Age: 54
End: 2019-11-05

## 2019-11-05 NOTE — TELEPHONE ENCOUNTER
The following medication needs a prior authorization:     Medication Name: Vascepa    Dosage: 2 g     Frequency: 2 times daily    Directions for use: Take 2 g by mouth two times daily    Diagnosis: Pure hypertriglyceridemia(E78.1)    Is the request for a reauthorization? no    Is the patient currently stable on therapy? yes    Please list all therapeutic alternatives previously used with start/end dates and outcome:    Pt is currently taking samples of Vascepa.

## 2020-03-03 ENCOUNTER — OFFICE VISIT (OUTPATIENT)
Dept: DERMATOLOGY | Facility: CLINIC | Age: 55
End: 2020-03-03
Payer: COMMERCIAL

## 2020-03-03 DIAGNOSIS — L81.4 SOLAR LENTIGO: ICD-10-CM

## 2020-03-03 DIAGNOSIS — D22.9 MULTIPLE BENIGN NEVI: ICD-10-CM

## 2020-03-03 DIAGNOSIS — L82.1 SEBORRHEIC KERATOSES: ICD-10-CM

## 2020-03-03 DIAGNOSIS — Z80.8 FAMILY HISTORY OF MELANOMA: ICD-10-CM

## 2020-03-03 DIAGNOSIS — D18.01 CHERRY ANGIOMA: ICD-10-CM

## 2020-03-03 DIAGNOSIS — L57.0 ACTINIC KERATOSES: Primary | ICD-10-CM

## 2020-03-03 PROCEDURE — 17000 DESTRUCT PREMALG LESION: CPT | Mod: S$GLB,,, | Performed by: DERMATOLOGY

## 2020-03-03 PROCEDURE — 17000 PR DESTRUCTION(LASER SURGERY,CRYOSURGERY,CHEMOSURGERY),PREMALIGNANT LESIONS,FIRST LESION: ICD-10-PCS | Mod: S$GLB,,, | Performed by: DERMATOLOGY

## 2020-03-03 PROCEDURE — 99999 PR PBB SHADOW E&M-EST. PATIENT-LVL II: CPT | Mod: PBBFAC,,, | Performed by: DERMATOLOGY

## 2020-03-03 PROCEDURE — 99999 PR PBB SHADOW E&M-EST. PATIENT-LVL II: ICD-10-PCS | Mod: PBBFAC,,, | Performed by: DERMATOLOGY

## 2020-03-03 PROCEDURE — 99202 PR OFFICE/OUTPT VISIT, NEW, LEVL II, 15-29 MIN: ICD-10-PCS | Mod: 25,S$GLB,, | Performed by: DERMATOLOGY

## 2020-03-03 PROCEDURE — 99202 OFFICE O/P NEW SF 15 MIN: CPT | Mod: 25,S$GLB,, | Performed by: DERMATOLOGY

## 2020-03-03 NOTE — PROGRESS NOTES
Subjective:       Patient ID:  Daniela Lawson is a 54 y.o. female who presents for No chief complaint on file.    54 y.o. female who presents for lesions on her face.     She has a spot on her R cheek, L nose (Rough in texture) more constant than the lesion on her L cheek. Gets red and irritated.   A red spot on her L cheek that has been present for about 3 or 4 months and does itch a little.   A brown spot on her bottom lip that has been present for over a year. The lesion itself doesn't bother her but the pigmentation (turning white) bothers her daughter.      Derm HX: PHX of Pre- CA treated with Cry by Dr. Ish Zepeda   Fhx of Skin CA: Dad - MM paternal uncle, MM  Mom - SCC       Review of Systems   Skin: Positive for daily sunscreen use and activity-related sunscreen use. Negative for itching, rash, dry skin and wears hat.   Hematologic/Lymphatic: Bruises/bleeds easily.       Past Medical History:   Diagnosis Date    Anesthesia 2009    Pt reports severe drop in BP when anesthesia given.    Asthma     Diverticulitis     GERD (gastroesophageal reflux disease)     Hypertension     Neuropathy 2007         Objective:    Physical Exam   Constitutional: She appears well-developed and well-nourished. No distress.   HENT:   Mouth/Throat: Lips normal.    Eyes: Lids are normal.    Neurological: She is alert and oriented to person, place, and time. She is not disoriented.   Psychiatric: She has a normal mood and affect. She is not agitated.   Skin:   Areas Examined (abnormalities noted in diagram):   Scalp / Hair Palpated and Inspected  Head / Face Inspection Performed  Neck Inspection Performed  Chest / Axilla Inspection Performed  Abdomen Inspection Performed  Back Inspection Performed  RUE Inspected  LUE Inspection Performed  Nails and Digits Inspection Performed                   Diagram Legend     Erythematous scaling macule/papule c/w actinic keratosis       Vascular papule c/w angioma      Pigmented  verrucoid papule/plaque c/w seborrheic keratosis      Yellow umbilicated papule c/w sebaceous hyperplasia      Irregularly shaped tan macule c/w lentigo     1-2 mm smooth white papules consistent with Milia      Movable subcutaneous cyst with punctum c/w epidermal inclusion cyst      Subcutaneous movable cyst c/w pilar cyst      Firm pink to brown papule c/w dermatofibroma      Pedunculated fleshy papule(s) c/w skin tag(s)      Evenly pigmented macule c/w junctional nevus     Mildly variegated pigmented, slightly irregular-bordered macule c/w mildly atypical nevus      Flesh colored to evenly pigmented papule c/w intradermal nevus       Pink pearly papule/plaque c/w basal cell carcinoma      Erythematous hyperkeratotic cursted plaque c/w SCC      Surgical scar with no sign of skin cancer recurrence      Open and closed comedones      Inflammatory papules and pustules      Verrucoid papule consistent consistent with wart     Erythematous eczematous patches and plaques     Dystrophic onycholytic nail with subungual debris c/w onychomycosis     Umbilicated papule    Erythematous-base heme-crusted tan verrucoid plaque consistent with inflamed seborrheic keratosis     Erythematous Silvery Scaling Plaque c/w Psoriasis     See annotation      Assessment / Plan:        Actinic keratoses, nose  Cryosurgery Procedure Note    Verbal consent from the patient is obtained and the patient is aware of the precancerous quality and need for treatment of these lesions. Liquid nitrogen cryosurgery is applied to the 1 actinic keratoses, as detailed in the physical exam, to produce a freeze injury. The patient is aware that blisters may form and is instructed on wound care with gentle cleansing and use of vaseline ointment to keep moist until healed. The patient is supplied a handout on cryosurgery and is instructed to call if lesions do not completely resolve.    Seborrheic keratoses  These are benign inherited growths without a  malignant potential. Reassurance given to patient. No treatment is necessary.     Solar lentigo  This is a benign hyperpigmented sun induced lesion. Daily sun protection will reduce the number of new lesions. Treatment of these benign lesions are considered cosmetic.    Multiple benign nevi  Discussed ABCDE's of nevi.  Monitor for new mole or moles that are becoming bigger, darker, irritated, or developing irregular borders. Brochure provided.    FH MM, father, paternal uncle  Upper body skin examination performed today including at least 6 points as noted in physical examination. No lesions suspicious for malignancy noted.  Strict sun protection  Self exams  TBSE at next visit    Patient instructed in importance in daily sun protection of at least spf 30. Mineral sunscreen ingredients preferred (Zinc +/- Titanium).   Recommend Elta MD for daily use on face and neck.  Patient encouraged to wear hat for all outdoor exposure.   Also discussed sun avoidance and use of protective clothing.           Follow up in about 1 year (around 3/3/2021), or if symptoms worsen or fail to improve.

## 2020-03-03 NOTE — PATIENT INSTRUCTIONS

## 2020-03-05 ENCOUNTER — PATIENT MESSAGE (OUTPATIENT)
Dept: FAMILY MEDICINE | Facility: CLINIC | Age: 55
End: 2020-03-05

## 2020-03-05 DIAGNOSIS — J45.20 MILD INTERMITTENT ASTHMA WITHOUT COMPLICATION: Primary | ICD-10-CM

## 2020-03-05 RX ORDER — ALBUTEROL SULFATE 90 UG/1
1-2 AEROSOL, METERED RESPIRATORY (INHALATION)
Qty: 18 G | Refills: 3 | Status: SHIPPED | OUTPATIENT
Start: 2020-03-05 | End: 2022-11-09

## 2020-03-09 DIAGNOSIS — J45.20 MILD INTERMITTENT ASTHMA WITHOUT COMPLICATION: ICD-10-CM

## 2020-03-09 NOTE — TELEPHONE ENCOUNTER
Pharmacy needs clarification on patients Serevent diskus. Do you want her to take one puff daily or two puffs?

## 2020-04-22 ENCOUNTER — OFFICE VISIT (OUTPATIENT)
Dept: FAMILY MEDICINE | Facility: CLINIC | Age: 55
End: 2020-04-22
Payer: COMMERCIAL

## 2020-04-22 DIAGNOSIS — E78.1 PURE HYPERTRIGLYCERIDEMIA: Primary | ICD-10-CM

## 2020-04-22 PROCEDURE — 99213 OFFICE O/P EST LOW 20 MIN: CPT | Mod: 95,,, | Performed by: NURSE PRACTITIONER

## 2020-04-22 PROCEDURE — 99213 PR OFFICE/OUTPT VISIT, EST, LEVL III, 20-29 MIN: ICD-10-PCS | Mod: 95,,, | Performed by: NURSE PRACTITIONER

## 2020-04-22 RX ORDER — ICOSAPENT ETHYL 1000 MG/1
2 CAPSULE ORAL 2 TIMES DAILY
Qty: 360 CAPSULE | Refills: 1 | Status: SHIPPED | OUTPATIENT
Start: 2020-04-22 | End: 2020-11-02

## 2020-04-22 NOTE — PROGRESS NOTES
Subjective:        The chief complaint leading to consultation is: trigs recheck  The patient location is:  Home  Visit type: Virtual visit with synchronous audio/video or audio only  This was a video visit in lieu of in-person visit due to the coronavirus emergency. Patient acknowledged and consented to the video visit encounter.     Patient presents for cholesterol recheck, in light of COVID-19 pandemic she has not had her lipids repeated, she is amenable to doing this in a month once social distancing has been relaxed, no physical complaints aside from some seasonal allergy symptoms      Past Surgical History:   Procedure Laterality Date     SECTION  1990    CHOLECYSTECTOMY      HYSTERECTOMY      INSERTION OF URETERAL CATHETER N/A 10/16/2019    Procedure: INSERTION, CATHETER, URETER;  Surgeon: Willard Ruffin MD;  Location: [facility] OR;  Service: Urology;  Laterality: N/A;    LAPAROSCOPIC SIGMOIDECTOMY N/A 10/16/2019    Procedure: COLECTOMY, SIGMOID, LAPAROSCOPIC;  Surgeon: Doug Gifford MD;  Location: [facility] OR;  Service: General;  Laterality: N/A;    SHOULDER ARTHROSCOPY Bilateral     STOMACH SURGERY      Possible Nissen per pt- To treat acid reflux     TUBAL LIGATION  1990     Past Medical History:   Diagnosis Date    Anesthesia 2009    Pt reports severe drop in BP when anesthesia given.    Asthma     Diverticulitis     GERD (gastroesophageal reflux disease)     Hypertension     Neuropathy 2007     Family History   Problem Relation Age of Onset    Coronary artery disease Mother     Hyperlipidemia Mother     Other Mother         Breast Nodules    Arthritis Mother     Hypertension Father     Atrial fibrillation Father     Neuropathy Father     Other Father         Bypass    Diabetes Father     Hypertension Sister     Cancer Sister         Breast Cacner    Neuropathy Sister     Hypertension Daughter     No Known Problems Son     Diabetes Maternal  Grandmother     Hypertension Maternal Grandmother     No Known Problems Maternal Grandfather     Coronary artery disease Paternal Grandmother     Cancer Paternal Grandmother         Breast Cancer    Heart attack Paternal Grandmother     Dementia Paternal Grandfather     Heart attack Paternal Grandfather     No Known Problems Sister     Other Daughter         Benign Hypertension(Brain)    Breast cancer Maternal Aunt     Breast cancer Paternal Aunt         Social History:   Marital Status:   Alcohol History:  reports that she drinks about 3.0 standard drinks of alcohol per week.  Tobacco History:  reports that she has never smoked. She has never used smokeless tobacco.  Drug History:  reports that she does not use drugs.    Review of patient's allergies indicates:   Allergen Reactions    Lisinopril Other (See Comments)     Chest pain    Meloxicam     Meperidine     Oxycodone-acetaminophen        Current Outpatient Medications   Medication Sig Dispense Refill    albuterol (PROVENTIL/VENTOLIN HFA) 90 mcg/actuation inhaler Inhale 1-2 puffs into the lungs every 4 to 6 hours as needed for Wheezing or Shortness of Breath. Rescue 18 g 3    amLODIPine (NORVASC) 2.5 MG tablet Take 1 tablet (2.5 mg total) by mouth Daily. 90 tablet 3    cetirizine 10 mg chewable tablet Take 10 mg by mouth once daily.      cholecalciferol, vitamin D3, (VITAMIN D3) 5,000 unit Tab 5,000 Units once daily.       cyanocobalamin, vitamin B-12, 5,000 mcg TbDL Take 5,000 Units by mouth once daily.       FLAXSEED OIL ORAL Take 1,300 mg by mouth Daily.      glucosamine HCl/chondroitin pradhan (GLUCOSAMINE-CHONDROITIN ORAL) Take 1 tablet by mouth once daily.      icosapent ethyL (VASCEPA) 1 gram Cap Take 2 g by mouth 2 (two) times daily. 360 capsule 1    magnesium oxide 500 mg Cap Take 1 capsule by mouth once daily.      multivit-min/iron/folic/lutein (MULTIVITAMIN WOMEN 50 PLUS ORAL) Take 1 tablet by mouth Daily.      nebivolol  (BYSTOLIC) 5 MG Tab Take 1 tablet (5 mg total) by mouth Daily. 90 tablet 3    salmeteroL (SEREVENT) 50 mcg/dose diskus inhaler Inhale 1 puff into the lungs 2 (two) times daily. Controller 3 each 3    triamterene-hydrochlorothiazide 37.5-25 mg (MAXZIDE-25MG) 37.5-25 mg per tablet Take 1 tablet by mouth once daily. 90 tablet 3    turmeric root extract 500 mg Cap Take 500 mg by mouth once daily.        No current facility-administered medications for this visit.        Review of Systems   Constitutional: Negative for activity change, appetite change, fatigue and unexpected weight change.   HENT: Positive for sneezing (seasonal). Negative for congestion, ear pain, hearing loss, postnasal drip, sinus pressure, sinus pain and sore throat.    Eyes: Positive for itching (seasonal). Negative for photophobia and pain.   Respiratory: Negative for cough, chest tightness, shortness of breath and wheezing.    Cardiovascular: Negative for chest pain, palpitations and leg swelling.   Gastrointestinal: Negative for abdominal distention, abdominal pain, blood in stool, constipation, diarrhea, nausea and vomiting.   Endocrine: Negative for cold intolerance, heat intolerance, polydipsia and polyuria.   Genitourinary: Negative for difficulty urinating, dysuria, flank pain, frequency, hematuria, pelvic pain and urgency.   Musculoskeletal: Negative for arthralgias, back pain, joint swelling, myalgias and neck pain.   Skin: Negative for pallor.   Allergic/Immunologic: Positive for environmental allergies. Negative for food allergies.   Neurological: Negative for dizziness, weakness, light-headedness and numbness.   Hematological: Does not bruise/bleed easily.   Psychiatric/Behavioral: Negative for agitation, confusion, decreased concentration and sleep disturbance. The patient is not nervous/anxious.          Objective:        Physical Exam:   Physical Exam   Constitutional: She is oriented to person, place, and time. She appears  well-developed and well-nourished. No distress.   overweight   HENT:   Head: Normocephalic and atraumatic.   Nose: Nose normal.   Eyes: Lids are normal.   Neck: Normal range of motion. No tracheal deviation present.   Pulmonary/Chest: Effort normal. No respiratory distress.   Neurological: She is alert and oriented to person, place, and time. GCS eye subscore is 4. GCS verbal subscore is 5. GCS motor subscore is 6.   Skin: No pallor.   Psychiatric: She has a normal mood and affect. Her speech is normal and behavior is normal. Judgment and thought content normal. Cognition and memory are normal.            Assessment:       1. Pure hypertriglyceridemia      Plan:   Pure hypertriglyceridemia  -     Lipid panel; Future; Expected date: 04/23/2020  -     icosapent ethyL (VASCEPA) 1 gram Cap; Take 2 g by mouth 2 (two) times daily.  Dispense: 360 capsule; Refill: 1      Follow up in about 6 months (around 10/22/2020) for wellness exam.    Total time spent with patient: 34 mins    Each patient to whom he or she provides medical services by telemedicine is:  (1) informed of the relationship between the physician and patient and the respective role of any other health care provider with respect to management of the patient; and (2) notified that he or she may decline to receive medical services by telemedicine and may withdraw from such care at any time.    This note was created using [employer] voice recognition software that occasionally misinterprets phrases or words.

## 2020-04-29 ENCOUNTER — LAB VISIT (OUTPATIENT)
Dept: LAB | Facility: HOSPITAL | Age: 55
End: 2020-04-29
Attending: NURSE PRACTITIONER
Payer: COMMERCIAL

## 2020-04-29 DIAGNOSIS — E78.1 PURE HYPERTRIGLYCERIDEMIA: ICD-10-CM

## 2020-04-29 LAB
CHOLEST SERPL-MCNC: 183 MG/DL (ref 120–199)
CHOLEST/HDLC SERPL: 4.7 {RATIO} (ref 2–5)
HDLC SERPL-MCNC: 39 MG/DL (ref 40–75)
HDLC SERPL: 21.3 % (ref 20–50)
LDLC SERPL CALC-MCNC: 88.8 MG/DL (ref 63–159)
NONHDLC SERPL-MCNC: 144 MG/DL
TRIGL SERPL-MCNC: 276 MG/DL (ref 30–150)

## 2020-04-29 PROCEDURE — 36415 COLL VENOUS BLD VENIPUNCTURE: CPT

## 2020-04-29 PROCEDURE — 80061 LIPID PANEL: CPT

## 2020-04-30 ENCOUNTER — PATIENT MESSAGE (OUTPATIENT)
Dept: FAMILY MEDICINE | Facility: CLINIC | Age: 55
End: 2020-04-30

## 2020-05-05 ENCOUNTER — PATIENT MESSAGE (OUTPATIENT)
Dept: FAMILY MEDICINE | Facility: CLINIC | Age: 55
End: 2020-05-05

## 2020-05-05 ENCOUNTER — TELEPHONE (OUTPATIENT)
Dept: FAMILY MEDICINE | Facility: CLINIC | Age: 55
End: 2020-05-05

## 2020-05-05 NOTE — TELEPHONE ENCOUNTER
----- Message from CHITO Malloy,FNP-C sent at 5/4/2020  3:52 PM CDT -----  Total cholesterol has increased about 40 points but remains within normal limits, triglycerides have decreased almost 70 points, good cholesterol has increased 12 points (likely from increased exercise), LDL (bad) cholesterol has increased almost 40 points.  Are you taking Vascepa 2 capsules twice daily with meals?

## 2020-06-17 ENCOUNTER — HOSPITAL ENCOUNTER (EMERGENCY)
Facility: HOSPITAL | Age: 55
Discharge: HOME | End: 2020-06-17
Attending: EMERGENCY MEDICINE
Payer: COMMERCIAL

## 2020-06-17 VITALS
HEIGHT: 64 IN | OXYGEN SATURATION: 98 % | WEIGHT: 184.31 LBS | HEART RATE: 71 BPM | TEMPERATURE: 98 F | DIASTOLIC BLOOD PRESSURE: 88 MMHG | RESPIRATION RATE: 16 BRPM | SYSTOLIC BLOOD PRESSURE: 144 MMHG | BODY MASS INDEX: 31.47 KG/M2

## 2020-06-17 DIAGNOSIS — T18.9XXA FOREIGN BODY IN DIGESTIVE TRACT, INITIAL ENCOUNTER: Primary | ICD-10-CM

## 2020-06-17 PROCEDURE — 99282 EMERGENCY DEPT VISIT SF MDM: CPT

## 2020-06-17 RX ORDER — LIDOCAINE HYDROCHLORIDE 20 MG/ML
5 SOLUTION OROPHARYNGEAL
Status: DISCONTINUED | OUTPATIENT
Start: 2020-06-17 | End: 2020-06-17

## 2020-06-18 NOTE — ED PROVIDER NOTES
Encounter Date: 2020    SCRIBE #1 NOTE: I, Kirti Borjas, am scribing for, and in the presence of, Salomon Lanza MD.       History     Chief Complaint   Patient presents with    Foreign Body     pill stuck in throat     Time seen by provider: 10:45 PM on 2020    Daniela Lawson is a 54 y.o. female who presents to the ED with an onset of a pill stuck in her throat PTA. She states that she is swallowing and drinking with no problem but feels like the pill is not moving. The patient denies trouble swallowing or any other symptoms at this time. Pertinent PMHx includes HTN, asthma, diverticulitis, and GERD. No pertinent PSHx. Known drug allergies include lisinopril, meloxicam, meperidine, and oxycodone.      The history is provided by the patient.     Review of patient's allergies indicates:   Allergen Reactions    Lisinopril Other (See Comments)     Chest pain    Meloxicam     Meperidine     Oxycodone-acetaminophen      Past Medical History:   Diagnosis Date    Anesthesia 2009    Pt reports severe drop in BP when anesthesia given.    Asthma     Diverticulitis     GERD (gastroesophageal reflux disease)     Hypertension     Neuropathy      Past Surgical History:   Procedure Laterality Date     SECTION  1990    CHOLECYSTECTOMY      HYSTERECTOMY      INSERTION OF URETERAL CATHETER N/A 10/16/2019    Procedure: INSERTION, CATHETER, URETER;  Surgeon: Willard Ruffin MD;  Location: [facility] OR;  Service: Urology;  Laterality: N/A;    LAPAROSCOPIC SIGMOIDECTOMY N/A 10/16/2019    Procedure: COLECTOMY, SIGMOID, LAPAROSCOPIC;  Surgeon: Doug Gifford MD;  Location: [facility] OR;  Service: General;  Laterality: N/A;    SHOULDER ARTHROSCOPY Bilateral     STOMACH SURGERY      Possible Nissen per pt- To treat acid reflux     TUBAL LIGATION  1990     Family History   Problem Relation Age of Onset    Coronary artery disease Mother     Hyperlipidemia Mother     Other Mother          Breast Nodules    Arthritis Mother     Hypertension Father     Atrial fibrillation Father     Neuropathy Father     Other Father         Bypass    Diabetes Father     Hypertension Sister     Cancer Sister         Breast Cacner    Neuropathy Sister     Hypertension Daughter     No Known Problems Son     Diabetes Maternal Grandmother     Hypertension Maternal Grandmother     No Known Problems Maternal Grandfather     Coronary artery disease Paternal Grandmother     Cancer Paternal Grandmother         Breast Cancer    Heart attack Paternal Grandmother     Dementia Paternal Grandfather     Heart attack Paternal Grandfather     No Known Problems Sister     Other Daughter         Benign Hypertension(Brain)    Breast cancer Maternal Aunt     Breast cancer Paternal Aunt      Social History     Tobacco Use    Smoking status: Never Smoker    Smokeless tobacco: Never Used   Substance Use Topics    Alcohol use: Yes     Alcohol/week: 3.0 standard drinks     Types: 3 Glasses of wine per week     Frequency: Monthly or less     Drinks per session: 1 or 2     Binge frequency: Never     Comment: 3 glasses of wine per week     Drug use: Never     Review of Systems   Constitutional: Negative for fever.   HENT: Negative for trouble swallowing.         Positive for foreign object stuck in throat.   Respiratory: Negative for shortness of breath.    Genitourinary: Negative for flank pain.   Musculoskeletal: Negative for gait problem.   Neurological: Negative for weakness.   Psychiatric/Behavioral: Negative for confusion.       Physical Exam     Initial Vitals [06/17/20 2231]   BP Pulse Resp Temp SpO2   (!) 144/88 71 16 98.1 °F (36.7 °C) 98 %      MAP       --         Physical Exam    Nursing note and vitals reviewed.  Constitutional: She appears well-developed and well-nourished.   HENT:   Head: Normocephalic and atraumatic.   Mouth/Throat: No posterior oropharyngeal edema or posterior oropharyngeal  erythema.   Eyes: Conjunctivae are normal.   Neck: Normal range of motion. Neck supple.   Cardiovascular: Normal rate, regular rhythm and normal heart sounds. Exam reveals no gallop and no friction rub.    No murmur heard.  Pulmonary/Chest: Effort normal and breath sounds normal. No respiratory distress. She has no wheezes. She has no rhonchi. She has no rales.   Abdominal: Soft. She exhibits no distension. There is no abdominal tenderness.   Musculoskeletal: Normal range of motion.   Neurological: She is alert and oriented to person, place, and time.   Skin: Skin is warm and dry. No erythema.   Psychiatric: She has a normal mood and affect.         ED Course   Procedures  Labs Reviewed - No data to display       Imaging Results    None          Medical Decision Making:   History:   Old Medical Records: I decided to obtain old medical records.  ED Management:  54-year-old female presents with a foreign body sensation in her throat after swallowing a pill and suspects that it is stuck in her throat.  She is given viscous lidocaine and reports that the pill has advanced with no further symptoms.  This may represent a transient esophageal foreign body versus a esophageal abrasion.  She does not mandate emergent endoscopy at present.  She is encouraged to return for difficulty swallowing or difficulty breathing.       APC / Resident Notes:   I, Dr. Salomon Lanza III, personally performed the services described in this documentation. All medical record entries made by the scribe were at my direction and in my presence.  I have reviewed the chart and agree that the record reflects my personal performance and is accurate and complete       Scribe Attestation:   Scribe #1: I performed the above scribed service and the documentation accurately describes the services I performed. I attest to the accuracy of the note.                          Clinical Impression:       ICD-10-CM ICD-9-CM   1. Foreign body in digestive tract,  initial encounter  T18.9XXA 938     [id number]         Disposition:   Disposition: Discharged  Condition: Stable                        Salomon Lanza III, MD  06/18/20 0034

## 2020-06-23 ENCOUNTER — PATIENT MESSAGE (OUTPATIENT)
Dept: FAMILY MEDICINE | Facility: CLINIC | Age: 55
End: 2020-06-23

## 2020-06-23 DIAGNOSIS — R13.10 DYSPHAGIA, UNSPECIFIED TYPE: Primary | ICD-10-CM

## 2020-09-14 ENCOUNTER — PATIENT MESSAGE (OUTPATIENT)
Dept: FAMILY MEDICINE | Facility: CLINIC | Age: 55
End: 2020-09-14

## 2020-09-14 DIAGNOSIS — Z12.31 SCREENING MAMMOGRAM, ENCOUNTER FOR: Primary | ICD-10-CM

## 2020-10-13 ENCOUNTER — HOSPITAL ENCOUNTER (OUTPATIENT)
Dept: RADIOLOGY | Facility: HOSPITAL | Age: 55
Discharge: HOME | End: 2020-10-13
Attending: NURSE PRACTITIONER
Payer: COMMERCIAL

## 2020-10-13 DIAGNOSIS — Z12.31 SCREENING MAMMOGRAM, ENCOUNTER FOR: ICD-10-CM

## 2020-10-13 PROCEDURE — 77067 SCR MAMMO BI INCL CAD: CPT | Mod: TC,PO

## 2020-10-14 ENCOUNTER — PATIENT MESSAGE (OUTPATIENT)
Dept: FAMILY MEDICINE | Facility: CLINIC | Age: 55
End: 2020-10-14

## 2020-10-14 DIAGNOSIS — M25.531 RIGHT WRIST PAIN: Primary | ICD-10-CM

## 2020-10-22 ENCOUNTER — OFFICE VISIT (OUTPATIENT)
Dept: ORTHOPEDICS | Facility: CLINIC | Age: 55
End: 2020-10-22
Payer: COMMERCIAL

## 2020-10-22 VITALS
SYSTOLIC BLOOD PRESSURE: 122 MMHG | OXYGEN SATURATION: 99 % | HEART RATE: 62 BPM | HEIGHT: 64 IN | WEIGHT: 184 LBS | BODY MASS INDEX: 31.41 KG/M2 | DIASTOLIC BLOOD PRESSURE: 70 MMHG

## 2020-10-22 DIAGNOSIS — M25.531 RIGHT WRIST PAIN: ICD-10-CM

## 2020-10-22 DIAGNOSIS — M19.031 ARTHRITIS OF RIGHT WRIST: Primary | ICD-10-CM

## 2020-10-22 PROCEDURE — 3078F DIAST BP <80 MM HG: CPT | Mod: S$GLB,,, | Performed by: ORTHOPAEDIC SURGERY

## 2020-10-22 PROCEDURE — 20605 DRAIN/INJ JOINT/BURSA W/O US: CPT | Mod: RT,S$GLB,, | Performed by: ORTHOPAEDIC SURGERY

## 2020-10-22 PROCEDURE — 3008F BODY MASS INDEX DOCD: CPT | Mod: S$GLB,,, | Performed by: ORTHOPAEDIC SURGERY

## 2020-10-22 PROCEDURE — 20605 INTERMEDIATE JOINT ASPIRATION/INJECTION: R RADIOCARPAL: ICD-10-PCS | Mod: RT,S$GLB,, | Performed by: ORTHOPAEDIC SURGERY

## 2020-10-22 PROCEDURE — 99244 OFF/OP CNSLTJ NEW/EST MOD 40: CPT | Mod: 25,S$GLB,, | Performed by: ORTHOPAEDIC SURGERY

## 2020-10-22 PROCEDURE — 3074F PR MOST RECENT SYSTOLIC BLOOD PRESSURE < 130 MM HG: ICD-10-PCS | Mod: S$GLB,,, | Performed by: ORTHOPAEDIC SURGERY

## 2020-10-22 PROCEDURE — 3078F PR MOST RECENT DIASTOLIC BLOOD PRESSURE < 80 MM HG: ICD-10-PCS | Mod: S$GLB,,, | Performed by: ORTHOPAEDIC SURGERY

## 2020-10-22 PROCEDURE — 99244 PR OFFICE CONSULTATION,LEVEL IV: ICD-10-PCS | Mod: 25,S$GLB,, | Performed by: ORTHOPAEDIC SURGERY

## 2020-10-22 PROCEDURE — 3008F PR BODY MASS INDEX (BMI) DOCUMENTED: ICD-10-PCS | Mod: S$GLB,,, | Performed by: ORTHOPAEDIC SURGERY

## 2020-10-22 PROCEDURE — 3074F SYST BP LT 130 MM HG: CPT | Mod: S$GLB,,, | Performed by: ORTHOPAEDIC SURGERY

## 2020-10-22 RX ORDER — METHYLPREDNISOLONE ACETATE 40 MG/ML
40 INJECTION, SUSPENSION INTRA-ARTICULAR; INTRALESIONAL; INTRAMUSCULAR; SOFT TISSUE
Status: DISCONTINUED | OUTPATIENT
Start: 2020-10-22 | End: 2020-10-22 | Stop reason: HOSPADM

## 2020-10-22 RX ADMIN — METHYLPREDNISOLONE ACETATE 40 MG: 40 INJECTION, SUSPENSION INTRA-ARTICULAR; INTRALESIONAL; INTRAMUSCULAR; SOFT TISSUE at 08:10

## 2020-10-22 NOTE — PROGRESS NOTES
[facility]    Subjective:     Chief Complaint:   Chief Complaint   Patient presents with    Right Wrist - Pain     Referred by Yoni Hogue NP for right wrist pain progressively worse x 3 months. No trauma or injury.       Past Medical History:   Diagnosis Date    Anesthesia 2009    Pt reports severe drop in BP when anesthesia given.    Asthma     Diverticulitis     GERD (gastroesophageal reflux disease)     Hypertension     Neuropathy        Past Surgical History:   Procedure Laterality Date     SECTION  1990    CHOLECYSTECTOMY      HYSTERECTOMY      INSERTION OF URETERAL CATHETER N/A 10/16/2019    Procedure: INSERTION, CATHETER, URETER;  Surgeon: Willard Ruffin MD;  Location: [facility] OR;  Service: Urology;  Laterality: N/A;    LAPAROSCOPIC SIGMOIDECTOMY N/A 10/16/2019    Procedure: COLECTOMY, SIGMOID, LAPAROSCOPIC;  Surgeon: Doug Gifford MD;  Location: [facility] OR;  Service: General;  Laterality: N/A;    SHOULDER ARTHROSCOPY Bilateral     STOMACH SURGERY      Possible Nissen per pt- To treat acid reflux     TUBAL LIGATION  1990       Current Outpatient Medications   Medication Sig    albuterol (PROVENTIL/VENTOLIN HFA) 90 mcg/actuation inhaler Inhale 1-2 puffs into the lungs every 4 to 6 hours as needed for Wheezing or Shortness of Breath. Rescue    amLODIPine (NORVASC) 2.5 MG tablet TAKE 1 TABLET DAILY    BYSTOLIC 5 mg Tab TAKE 1 TABLET DAILY    cetirizine 10 mg chewable tablet Take 10 mg by mouth once daily.    cholecalciferol, vitamin D3, (VITAMIN D3) 5,000 unit Tab 5,000 Units once daily.     cyanocobalamin, vitamin B-12, 5,000 mcg TbDL Take 5,000 Units by mouth once daily.     icosapent ethyL (VASCEPA) 1 gram Cap Take 2 g by mouth 2 (two) times daily.    magnesium oxide 500 mg Cap Take 1 capsule by mouth once daily.    multivit-min/iron/folic/lutein (MULTIVITAMIN WOMEN 50 PLUS ORAL) Take 1 tablet by mouth Daily.    salmeteroL (SEREVENT)  50 mcg/dose diskus inhaler Inhale 1 puff into the lungs 2 (two) times daily. Controller    triamterene-hydrochlorothiazide 37.5-25 mg (MAXZIDE-25) 37.5-25 mg per tablet TAKE 1 TABLET DAILY    turmeric root extract 500 mg Cap Take 500 mg by mouth once daily.     FLAXSEED OIL ORAL Take 1,300 mg by mouth Daily.    glucosamine HCl/chondroitin pradhan (GLUCOSAMINE-CHONDROITIN ORAL) Take 1 tablet by mouth once daily.     No current facility-administered medications for this visit.        Review of patient's allergies indicates:   Allergen Reactions    Lisinopril Other (See Comments)     Chest pain    Meloxicam     Meperidine     Oxycodone-acetaminophen        Family History   Problem Relation Age of Onset    Coronary artery disease Mother     Hyperlipidemia Mother     Other Mother         Breast Nodules    Arthritis Mother     Hypertension Father     Atrial fibrillation Father     Neuropathy Father     Other Father         Bypass    Diabetes Father     Hypertension Sister     Cancer Sister         Breast Cacner    Neuropathy Sister     Hypertension Daughter     No Known Problems Son     Diabetes Maternal Grandmother     Hypertension Maternal Grandmother     No Known Problems Maternal Grandfather     Coronary artery disease Paternal Grandmother     Cancer Paternal Grandmother         Breast Cancer    Heart attack Paternal Grandmother     Dementia Paternal Grandfather     Heart attack Paternal Grandfather     No Known Problems Sister     Other Daughter         Benign Hypertension(Brain)    Breast cancer Maternal Aunt     Breast cancer Paternal Aunt        Social History     Socioeconomic History    Marital status:      Spouse name: Not on file    Number of children: Not on file    Years of education: Not on file    Highest education level: Not on file   Occupational History    Not on file   Social Needs    Financial resource strain: Not very hard    Food insecurity     Worry: Never  true     Inability: Never true    Transportation needs     Medical: No     Non-medical: No   Tobacco Use    Smoking status: Never Smoker    Smokeless tobacco: Never Used   Substance and Sexual Activity    Alcohol use: Yes     Alcohol/week: 3.0 standard drinks     Types: 3 Glasses of wine per week     Frequency: Monthly or less     Drinks per session: 1 or 2     Binge frequency: Never     Comment: 3 glasses of wine per week     Drug use: Never    Sexual activity: Not on file   Lifestyle    Physical activity     Days per week: 2 days     Minutes per session: 30 min    Stress: Only a little   Relationships    Social connections     Talks on phone: More than three times a week     Gets together: Once a week     Attends Latter day service: Not on file     Active member of club or organization: Yes     Attends meetings of clubs or organizations: 1 to 4 times per year     Relationship status:    Other Topics Concern    Not on file   Social History Narrative    Not on file       History of present illness:  A 55-year-old grandmother who presents to the office for evaluation of right wrist pain.  Insidious onset 3 months ago roughly.  She states that she has pain when she picks up her grand kids and she has to scoop more with her arm than using her hand or wrist.  Her pain is vague, she localizes it to the volar and dorsal aspects of the wrist as well as to the radial and ulnar sides of the wrist.      Review of Systems:    Constitution: Negative for chills, fever, and sweats.  Negative for unexplained weight loss.    HENT:  Negative for headaches and blurry vision.    Cardiovascular:Negative for chest pain or irregular heart beat. Negative for hypertension.    Respiratory:  Negative for cough and shortness of breath.    Gastrointestinal: Negative for abdominal pain, heartburn, melena, nausea, and vomitting.    Genitourinary:  Negative bladder incontinence and dysuria.    Musculoskeletal:  See HPI for  "details.     Neurological: Negative for numbness.    Psychiatric/Behavioral: Negative for depression.  The patient is not nervous/anxious.      Endocrine: Negative for polyuria    Hematologic/Lymphatic: Negative for bleeding problem.  Does not bruise/bleed easily.    Skin: Negative for poor would healing and rash    Objective:      Physical Examination:    Vital Signs:  There were no vitals filed for this visit.    There is no height or weight on file to calculate BMI.    This a well-developed, well nourished patient in no acute distress.  They are alert and oriented and cooperative to examination.        Examination of the right wrist, negative Phalen sign, negative Tinel's at the wrist.  She does complain of some mild Tinel symptoms at the elbow in the little finger.  Negative Finkelstein.  No pain at the base of the thumb, no pain with CMC grind test.  When I really stressed the radiocarpal joint or the TFCC the patient pain symptoms are exacerbated.  Pertinent New Results:    XRAY Report / Interpretation:   AP and lateral views of the right hand taken today in the office show some mild subluxation of the 1st carpometacarpal joint with some mild arthritic changes.  There is also some mild radiocarpal arthritis.    Assessment/Plan:      Right wrist arthritis, we injected the right wrist today with lidocaine and Depo-Medrol.  Will check the patient back in 4 weeks to see how she responds to this.  If her pain returns, we will get an MRI    Je Theodore PA-C served as a scribe for this patient encounter. A "face to face" encounter occured with Dr. Andriy Schwartz on this date. The treatment plan and medical decision making are outlined as above:      This note was created using Dragon voice recognition software that occasionally misinterpreted phrases or words.          "

## 2020-10-22 NOTE — LETTER
October 22, 2020      Yoni Morelos, CHITO,FNP-C  [address] [facility] [address]           [facility]  [address]  [facility] [address]  Phone: [phone]  Fax: [phone]          Patient: Daniela Lawson   MR Number: [id number]   YOB: 1965   Date of Visit: 10/22/2020       Dear Yoni Morelos:    Thank you for referring Daniela Lawson to me for evaluation. Attached you will find relevant portions of my assessment and plan of care.    If you have questions, please do not hesitate to call me. I look forward to following Daniela Lawson along with you.    Sincerely,    Andriy Schwartz MD    Enclosure  CC:  No Recipients    If you would like to receive this communication electronically, please contact [email or web address] or [phone] to request more information on [employer] access.    For providers and/or their staff who would like to refer a patient to [facility], please contact us through our one-stop-shop provider referral line, [facility], at [phone].    If you feel you have received this communication in error or would no longer like to receive these types of communications, please e-mail [email or web address]

## 2020-10-22 NOTE — PROCEDURES
Intermediate Joint Aspiration/Injection: R radiocarpal    Date/Time: 10/22/2020 8:30 AM  Performed by: Andriy Schwartz MD  Authorized by: Andriy Schwartz MD     Consent Done?: Yes (Verbal)  Indications:  Pain  Site marked: The procedure site was marked   Timeout: Prior to procedure the correct patient, procedure, and site was verified      Location:  Wrist  Site:  R radiocarpal  Prep: Patient was prepped and draped in usual sterile fashion    Needle size:  25 G  Medications:  40 mg methylPREDNISolone acetate 40 mg/mL  Patient tolerance:  Patient tolerated the procedure well with no immediate complications

## 2020-11-02 ENCOUNTER — OFFICE VISIT (OUTPATIENT)
Dept: FAMILY MEDICINE | Facility: CLINIC | Age: 55
End: 2020-11-02
Payer: COMMERCIAL

## 2020-11-02 VITALS
OXYGEN SATURATION: 97 % | WEIGHT: 181.63 LBS | SYSTOLIC BLOOD PRESSURE: 130 MMHG | TEMPERATURE: 96 F | DIASTOLIC BLOOD PRESSURE: 68 MMHG | HEIGHT: 64 IN | BODY MASS INDEX: 31.01 KG/M2 | HEART RATE: 85 BPM

## 2020-11-02 DIAGNOSIS — Z00.00 ROUTINE HEALTH MAINTENANCE: Primary | ICD-10-CM

## 2020-11-02 DIAGNOSIS — Z11.4 SCREENING FOR HIV WITHOUT PRESENCE OF RISK FACTORS: ICD-10-CM

## 2020-11-02 DIAGNOSIS — Z23 NEED FOR INFLUENZA VACCINATION: ICD-10-CM

## 2020-11-02 DIAGNOSIS — Z23 NEED FOR TDAP VACCINATION: ICD-10-CM

## 2020-11-02 PROCEDURE — 99396 PR PREVENTIVE VISIT,EST,40-64: ICD-10-PCS | Mod: 25,S$GLB,, | Performed by: NURSE PRACTITIONER

## 2020-11-02 PROCEDURE — 3075F SYST BP GE 130 - 139MM HG: CPT | Mod: S$GLB,,, | Performed by: NURSE PRACTITIONER

## 2020-11-02 PROCEDURE — 90686 FLU VACCINE (QUAD) GREATER THAN OR EQUAL TO 3YO PRESERVATIVE FREE IM: ICD-10-PCS | Mod: S$GLB,,, | Performed by: NURSE PRACTITIONER

## 2020-11-02 PROCEDURE — 90715 TDAP VACCINE GREATER THAN OR EQUAL TO 7YO IM: ICD-10-PCS | Mod: S$GLB,,, | Performed by: NURSE PRACTITIONER

## 2020-11-02 PROCEDURE — 90472 TDAP VACCINE GREATER THAN OR EQUAL TO 7YO IM: ICD-10-PCS | Mod: S$GLB,,, | Performed by: NURSE PRACTITIONER

## 2020-11-02 PROCEDURE — 90471 IMMUNIZATION ADMIN: CPT | Mod: S$GLB,,, | Performed by: NURSE PRACTITIONER

## 2020-11-02 PROCEDURE — 90686 IIV4 VACC NO PRSV 0.5 ML IM: CPT | Mod: S$GLB,,, | Performed by: NURSE PRACTITIONER

## 2020-11-02 PROCEDURE — 90471 FLU VACCINE (QUAD) GREATER THAN OR EQUAL TO 3YO PRESERVATIVE FREE IM: ICD-10-PCS | Mod: S$GLB,,, | Performed by: NURSE PRACTITIONER

## 2020-11-02 PROCEDURE — 3078F DIAST BP <80 MM HG: CPT | Mod: S$GLB,,, | Performed by: NURSE PRACTITIONER

## 2020-11-02 PROCEDURE — 90472 IMMUNIZATION ADMIN EACH ADD: CPT | Mod: S$GLB,,, | Performed by: NURSE PRACTITIONER

## 2020-11-02 PROCEDURE — 90715 TDAP VACCINE 7 YRS/> IM: CPT | Mod: S$GLB,,, | Performed by: NURSE PRACTITIONER

## 2020-11-02 PROCEDURE — 99396 PREV VISIT EST AGE 40-64: CPT | Mod: 25,S$GLB,, | Performed by: NURSE PRACTITIONER

## 2020-11-02 PROCEDURE — 3008F PR BODY MASS INDEX (BMI) DOCUMENTED: ICD-10-PCS | Mod: S$GLB,,, | Performed by: NURSE PRACTITIONER

## 2020-11-02 PROCEDURE — 3008F BODY MASS INDEX DOCD: CPT | Mod: S$GLB,,, | Performed by: NURSE PRACTITIONER

## 2020-11-02 PROCEDURE — 3075F PR MOST RECENT SYSTOLIC BLOOD PRESS GE 130-139MM HG: ICD-10-PCS | Mod: S$GLB,,, | Performed by: NURSE PRACTITIONER

## 2020-11-02 PROCEDURE — 3078F PR MOST RECENT DIASTOLIC BLOOD PRESSURE < 80 MM HG: ICD-10-PCS | Mod: S$GLB,,, | Performed by: NURSE PRACTITIONER

## 2020-11-02 NOTE — PROGRESS NOTES
SUBJECTIVE:      Patient ID: Daniela Lawson is a 55 y.o. female.    Chief Complaint: Hypertension    Presents for well exam & lab review, no physical complaints this visit    Discussed outstanding health maintenance       Past Surgical History:   Procedure Laterality Date     SECTION  1990    CHOLECYSTECTOMY      HYSTERECTOMY      INSERTION OF URETERAL CATHETER N/A 10/16/2019    Procedure: INSERTION, CATHETER, URETER;  Surgeon: Willard Ruffin MD;  Location: [facility] OR;  Service: Urology;  Laterality: N/A;    LAPAROSCOPIC SIGMOIDECTOMY N/A 10/16/2019    Procedure: COLECTOMY, SIGMOID, LAPAROSCOPIC;  Surgeon: Doug Gifford MD;  Location: [facility] OR;  Service: General;  Laterality: N/A;    SHOULDER ARTHROSCOPY Bilateral     STOMACH SURGERY      Possible Nissen per pt- To treat acid reflux     TUBAL LIGATION  1990     Family History   Problem Relation Age of Onset    Coronary artery disease Mother     Hyperlipidemia Mother     Other Mother         Breast Nodules    Arthritis Mother     Hypertension Father     Atrial fibrillation Father     Neuropathy Father     Other Father         Bypass    Diabetes Father     Hypertension Sister     Cancer Sister         Breast Cacner    Neuropathy Sister     Hypertension Daughter     No Known Problems Son     Diabetes Maternal Grandmother     Hypertension Maternal Grandmother     No Known Problems Maternal Grandfather     Coronary artery disease Paternal Grandmother     Cancer Paternal Grandmother         Breast Cancer    Heart attack Paternal Grandmother     Dementia Paternal Grandfather     Heart attack Paternal Grandfather     No Known Problems Sister     Other Daughter         Benign Hypertension(Brain)    Breast cancer Maternal Aunt     Breast cancer Paternal Aunt       Social History     Socioeconomic History    Marital status:      Spouse name: Not on file    Number of children: Not on file    Years of  education: Not on file    Highest education level: Not on file   Occupational History    Not on file   Social Needs    Financial resource strain: Not very hard    Food insecurity     Worry: Never true     Inability: Never true    Transportation needs     Medical: No     Non-medical: No   Tobacco Use    Smoking status: Never Smoker    Smokeless tobacco: Never Used   Substance and Sexual Activity    Alcohol use: Yes     Alcohol/week: 3.0 standard drinks     Types: 3 Glasses of wine per week     Frequency: Monthly or less     Drinks per session: 1 or 2     Binge frequency: Never     Comment: 3 glasses of wine per week     Drug use: Never    Sexual activity: Not on file   Lifestyle    Physical activity     Days per week: 2 days     Minutes per session: 30 min    Stress: Only a little   Relationships    Social connections     Talks on phone: More than three times a week     Gets together: Once a week     Attends [employer] service: Not on file     Active member of club or organization: Yes     Attends meetings of clubs or organizations: 1 to 4 times per year     Relationship status:    Other Topics Concern    Not on file   Social History Narrative    Not on file     Current Outpatient Medications   Medication Sig Dispense Refill    albuterol (PROVENTIL/VENTOLIN HFA) 90 mcg/actuation inhaler Inhale 1-2 puffs into the lungs every 4 to 6 hours as needed for Wheezing or Shortness of Breath. Rescue 18 g 3    amLODIPine (NORVASC) 2.5 MG tablet TAKE 1 TABLET DAILY 90 tablet 3    BYSTOLIC 5 mg Tab TAKE 1 TABLET DAILY 90 tablet 3    cetirizine 10 mg chewable tablet Take 10 mg by mouth once daily.      cholecalciferol, vitamin D3, (VITAMIN D3) 5,000 unit Tab 5,000 Units once daily.       cyanocobalamin, vitamin B-12, 5,000 mcg TbDL Take 5,000 Units by mouth once daily.       magnesium oxide 500 mg Cap Take 1 capsule by mouth once daily.      salmeteroL (SEREVENT) 50 mcg/dose diskus inhaler Inhale 1  puff into the lungs 2 (two) times daily. Controller 3 each 3    triamterene-hydrochlorothiazide 37.5-25 mg (MAXZIDE-25) 37.5-25 mg per tablet TAKE 1 TABLET DAILY 90 tablet 3    turmeric root extract 500 mg Cap Take 500 mg by mouth once daily.       VASCEPA 1 gram Cap TAKE 2 CAPSULES (2 GRAMS) TWICE A  capsule 3     No current facility-administered medications for this visit.      Review of patient's allergies indicates:   Allergen Reactions    Adhesive Hives    Lisinopril Other (See Comments)     Chest pain    Meloxicam     Meperidine     Oxycodone-acetaminophen       Past Medical History:   Diagnosis Date    Anesthesia 2009    Pt reports severe drop in BP when anesthesia given.    Asthma     Diverticulitis     GERD (gastroesophageal reflux disease)     Hypertension     Neuropathy      Past Surgical History:   Procedure Laterality Date     SECTION  1990    CHOLECYSTECTOMY      HYSTERECTOMY      INSERTION OF URETERAL CATHETER N/A 10/16/2019    Procedure: INSERTION, CATHETER, URETER;  Surgeon: Willard Ruffin MD;  Location: [facility] OR;  Service: Urology;  Laterality: N/A;    LAPAROSCOPIC SIGMOIDECTOMY N/A 10/16/2019    Procedure: COLECTOMY, SIGMOID, LAPAROSCOPIC;  Surgeon: Doug Gifford MD;  Location: [facility] OR;  Service: General;  Laterality: N/A;    SHOULDER ARTHROSCOPY Bilateral     STOMACH SURGERY      Possible Nissen per pt- To treat acid reflux     TUBAL LIGATION  1990       Review of Systems   Constitutional: Negative for activity change, appetite change, fatigue and unexpected weight change.   HENT: Negative for congestion, ear pain, hearing loss, postnasal drip, rhinorrhea, sinus pressure, sinus pain, sneezing, sore throat and trouble swallowing.    Eyes: Negative for photophobia, pain, discharge and visual disturbance.   Respiratory: Negative for cough, chest tightness, shortness of breath and wheezing.    Cardiovascular: Negative for chest pain,  "palpitations and leg swelling.   Gastrointestinal: Positive for abdominal pain (intermittent with certain foods) and diarrhea (intermittent). Negative for abdominal distention, blood in stool, constipation, nausea and vomiting.        States food is still getting stuck at times despite esophageal stretching about 2 weeks ago with Dr. Clements, admits she needs to drink more fluids when eating. Trying to cut out fruit & artificial sweetener per GI in an effort to help with her gas.   Endocrine: Negative for cold intolerance, heat intolerance, polydipsia and polyuria.   Genitourinary: Negative for difficulty urinating, dysuria, flank pain, frequency, hematuria, menstrual problem, pelvic pain and urgency.   Musculoskeletal: Positive for arthralgias (R wrist at times, improving somewhat s/p injection with Dr. Schwartz). Negative for back pain, joint swelling, myalgias and neck pain.   Skin: Negative for pallor.   Allergic/Immunologic: Negative for environmental allergies and food allergies.   Neurological: Negative for dizziness, weakness, light-headedness, numbness and headaches.   Hematological: Does not bruise/bleed easily.   Psychiatric/Behavioral: Negative for agitation, confusion, decreased concentration, dysphoric mood and sleep disturbance. The patient is not nervous/anxious.       OBJECTIVE:      Vitals:    11/02/20 1339   BP: 130/68   BP Location: Right arm   Patient Position: Sitting   BP Method: Medium (Manual)   Pulse: 85   Temp: 96.4 °F (35.8 °C)   SpO2: 97%   Weight: 82.4 kg (181 lb 9.6 oz)   Height: 5' 4" (1.626 m)     Physical Exam  Vitals signs and nursing note reviewed.   Constitutional:       General: She is not in acute distress.     Appearance: Normal appearance. She is well-developed. She is obese.   HENT:      Head: Normocephalic and atraumatic.      Right Ear: Hearing normal.      Left Ear: Hearing normal.      Nose: Nose normal. No rhinorrhea.   Eyes:      General: Lids are normal.         Right " eye: No discharge.         Left eye: No discharge.      Conjunctiva/sclera: Conjunctivae normal.      Right eye: Right conjunctiva is not injected.      Left eye: Left conjunctiva is not injected.      Pupils: Pupils are equal, round, and reactive to light. Pupils are equal.      Right eye: Pupil is round and reactive.      Left eye: Pupil is round and reactive.   Neck:      Musculoskeletal: Normal range of motion and neck supple.      Thyroid: No thyromegaly.      Vascular: No JVD.      Trachea: Trachea normal. No tracheal deviation.   Cardiovascular:      Rate and Rhythm: Normal rate and regular rhythm.      Pulses:           Radial pulses are 2+ on the right side and 2+ on the left side.      Heart sounds: Normal heart sounds. No murmur. No friction rub. No gallop.    Pulmonary:      Effort: Pulmonary effort is normal. No respiratory distress.      Breath sounds: Normal breath sounds. No stridor. No decreased breath sounds, wheezing, rhonchi or rales.   Abdominal:      General: Bowel sounds are normal. There is no distension.      Palpations: Abdomen is soft. Abdomen is not rigid.      Tenderness: There is no abdominal tenderness. There is no guarding.   Musculoskeletal: Normal range of motion.      Comments: Velcro brace in place to R wrist   Lymphadenopathy:      Cervical: No cervical adenopathy.   Skin:     General: Skin is warm and dry.      Capillary Refill: Capillary refill takes less than 2 seconds.      Coloration: Skin is not pale.      Findings: No lesion or rash.   Neurological:      Mental Status: She is alert and oriented to person, place, and time.      Motor: No atrophy.      Coordination: Coordination normal.      Gait: Gait normal.   Psychiatric:         Attention and Perception: She is attentive.         Speech: Speech normal.         Behavior: Behavior normal.         Thought Content: Thought content normal.         Judgment: Judgment normal.        Assessment:       1. Routine health  maintenance    2. Need for influenza vaccination    3. Need for Tdap vaccination    4. Screening for HIV without presence of risk factors        Plan:       Routine health maintenance  -     CBC Auto Differential; Future; Expected date: 11/09/2020  -     Comprehensive Metabolic Panel; Future; Expected date: 11/02/2020  -     Lipid Panel; Future; Expected date: 11/02/2020  -     TSH; Future; Expected date: 11/02/2020    Need for influenza vaccination  -     Influenza - Quadrivalent (PF)    Need for Tdap vaccination  -     Tdap Vaccine    Screening for HIV without presence of risk factors  -     HIV 1/2 Ag/Ab (4th Gen); Future; Expected date: 11/02/2020            Follow up in about 1 year (around 11/2/2021) for well exam.      11/2/2020 CHITO Shea, FNP-C

## 2020-11-03 ENCOUNTER — LAB VISIT (OUTPATIENT)
Dept: LAB | Facility: HOSPITAL | Age: 55
End: 2020-11-03
Attending: NURSE PRACTITIONER
Payer: COMMERCIAL

## 2020-11-03 DIAGNOSIS — Z00.00 ROUTINE HEALTH MAINTENANCE: ICD-10-CM

## 2020-11-03 DIAGNOSIS — Z11.4 SCREENING FOR HIV WITHOUT PRESENCE OF RISK FACTORS: ICD-10-CM

## 2020-11-03 LAB
ALBUMIN SERPL BCP-MCNC: 4.5 G/DL (ref 3.5–5.2)
ALP SERPL-CCNC: 44 U/L (ref 55–135)
ALT SERPL W/O P-5'-P-CCNC: 30 U/L (ref 10–44)
ANION GAP SERPL CALC-SCNC: 12 MMOL/L (ref 8–16)
AST SERPL-CCNC: 40 U/L (ref 10–40)
BASOPHILS # BLD AUTO: 0.02 K/UL (ref 0–0.2)
BASOPHILS NFR BLD: 0.4 % (ref 0–1.9)
BILIRUB SERPL-MCNC: 1 MG/DL (ref 0.1–1)
BUN SERPL-MCNC: 21 MG/DL (ref 6–20)
CALCIUM SERPL-MCNC: 9.4 MG/DL (ref 8.7–10.5)
CHLORIDE SERPL-SCNC: 99 MMOL/L (ref 95–110)
CHOLEST SERPL-MCNC: 183 MG/DL (ref 120–199)
CHOLEST/HDLC SERPL: 4.8 {RATIO} (ref 2–5)
CO2 SERPL-SCNC: 27 MMOL/L (ref 23–29)
CREAT SERPL-MCNC: 0.7 MG/DL (ref 0.5–1.4)
DIFFERENTIAL METHOD: ABNORMAL
EOSINOPHIL # BLD AUTO: 0.1 K/UL (ref 0–0.5)
EOSINOPHIL NFR BLD: 2 % (ref 0–8)
ERYTHROCYTE [DISTWIDTH] IN BLOOD BY AUTOMATED COUNT: 11.7 % (ref 11.5–14.5)
EST. GFR  (AFRICAN AMERICAN): >60 ML/MIN/1.73 M^2
EST. GFR  (NON AFRICAN AMERICAN): >60 ML/MIN/1.73 M^2
GLUCOSE SERPL-MCNC: 101 MG/DL (ref 70–110)
HCT VFR BLD AUTO: 39.2 % (ref 37–48.5)
HDLC SERPL-MCNC: 38 MG/DL (ref 40–75)
HDLC SERPL: 20.8 % (ref 20–50)
HGB BLD-MCNC: 13.2 G/DL (ref 12–16)
IMM GRANULOCYTES # BLD AUTO: 0.01 K/UL (ref 0–0.04)
IMM GRANULOCYTES NFR BLD AUTO: 0.2 % (ref 0–0.5)
LDLC SERPL CALC-MCNC: 105.8 MG/DL (ref 63–159)
LYMPHOCYTES # BLD AUTO: 1.6 K/UL (ref 1–4.8)
LYMPHOCYTES NFR BLD: 34.7 % (ref 18–48)
MCH RBC QN AUTO: 32 PG (ref 27–31)
MCHC RBC AUTO-ENTMCNC: 33.7 G/DL (ref 32–36)
MCV RBC AUTO: 95 FL (ref 82–98)
MONOCYTES # BLD AUTO: 0.3 K/UL (ref 0.3–1)
MONOCYTES NFR BLD: 5.7 % (ref 4–15)
NEUTROPHILS # BLD AUTO: 2.6 K/UL (ref 1.8–7.7)
NEUTROPHILS NFR BLD: 57 % (ref 38–73)
NONHDLC SERPL-MCNC: 145 MG/DL
NRBC BLD-RTO: 0 /100 WBC
PLATELET # BLD AUTO: 193 K/UL (ref 150–350)
PMV BLD AUTO: 8.9 FL (ref 9.2–12.9)
POTASSIUM SERPL-SCNC: 3.8 MMOL/L (ref 3.5–5.1)
PROT SERPL-MCNC: 7.1 G/DL (ref 6–8.4)
RBC # BLD AUTO: 4.12 M/UL (ref 4–5.4)
SODIUM SERPL-SCNC: 138 MMOL/L (ref 136–145)
TRIGL SERPL-MCNC: 196 MG/DL (ref 30–150)
TSH SERPL DL<=0.005 MIU/L-ACNC: 2.15 UIU/ML (ref 0.34–5.6)
WBC # BLD AUTO: 4.55 K/UL (ref 3.9–12.7)

## 2020-11-03 PROCEDURE — 84443 ASSAY THYROID STIM HORMONE: CPT

## 2020-11-03 PROCEDURE — 80053 COMPREHEN METABOLIC PANEL: CPT

## 2020-11-03 PROCEDURE — 36415 COLL VENOUS BLD VENIPUNCTURE: CPT

## 2020-11-03 PROCEDURE — 85025 COMPLETE CBC W/AUTO DIFF WBC: CPT

## 2020-11-03 PROCEDURE — 87389 HIV-1 AG W/HIV-1&-2 AB AG IA: CPT

## 2020-11-03 PROCEDURE — 80061 LIPID PANEL: CPT

## 2020-11-04 LAB — HIV 1+2 AB+HIV1 P24 AG SERPL QL IA: NON REACTIVE

## 2021-01-24 ENCOUNTER — OFFICE VISIT (OUTPATIENT)
Dept: URGENT CARE | Facility: CLINIC | Age: 56
End: 2021-01-24
Payer: COMMERCIAL

## 2021-01-24 VITALS
RESPIRATION RATE: 16 BRPM | TEMPERATURE: 98 F | SYSTOLIC BLOOD PRESSURE: 120 MMHG | OXYGEN SATURATION: 96 % | HEART RATE: 58 BPM | DIASTOLIC BLOOD PRESSURE: 78 MMHG

## 2021-01-24 DIAGNOSIS — Z20.822 ENCOUNTER FOR LABORATORY TESTING FOR COVID-19 VIRUS: Primary | ICD-10-CM

## 2021-01-24 DIAGNOSIS — U07.1 COVID-19 VIRUS DETECTED: ICD-10-CM

## 2021-01-24 LAB
CTP QC/QA: YES
SARS-COV-2 RDRP RESP QL NAA+PROBE: POSITIVE

## 2021-01-24 PROCEDURE — 99203 OFFICE O/P NEW LOW 30 MIN: CPT | Mod: S$GLB,,, | Performed by: EMERGENCY MEDICINE

## 2021-01-24 PROCEDURE — U0002 COVID-19 LAB TEST NON-CDC: HCPCS | Mod: QW,S$GLB,, | Performed by: EMERGENCY MEDICINE

## 2021-01-24 PROCEDURE — 99203 PR OFFICE/OUTPT VISIT, NEW, LEVL III, 30-44 MIN: ICD-10-PCS | Mod: S$GLB,,, | Performed by: EMERGENCY MEDICINE

## 2021-01-24 PROCEDURE — U0002: ICD-10-PCS | Mod: QW,S$GLB,, | Performed by: EMERGENCY MEDICINE

## 2021-04-23 ENCOUNTER — TELEPHONE (OUTPATIENT)
Dept: FAMILY MEDICINE | Facility: CLINIC | Age: 56
End: 2021-04-23

## 2021-04-23 ENCOUNTER — OFFICE VISIT (OUTPATIENT)
Dept: URGENT CARE | Facility: CLINIC | Age: 56
End: 2021-04-23
Payer: COMMERCIAL

## 2021-04-23 VITALS
HEART RATE: 63 BPM | RESPIRATION RATE: 16 BRPM | WEIGHT: 181 LBS | TEMPERATURE: 98 F | SYSTOLIC BLOOD PRESSURE: 123 MMHG | HEIGHT: 64 IN | OXYGEN SATURATION: 97 % | DIASTOLIC BLOOD PRESSURE: 78 MMHG | BODY MASS INDEX: 30.9 KG/M2

## 2021-04-23 DIAGNOSIS — T78.40XA ALLERGIC DISORDER, INITIAL ENCOUNTER: ICD-10-CM

## 2021-04-23 DIAGNOSIS — H66.90 OTITIS MEDIA, UNSPECIFIED LATERALITY, UNSPECIFIED OTITIS MEDIA TYPE: Primary | ICD-10-CM

## 2021-04-23 PROCEDURE — 99214 OFFICE O/P EST MOD 30 MIN: CPT | Mod: S$GLB,,, | Performed by: NURSE PRACTITIONER

## 2021-04-23 PROCEDURE — 3008F BODY MASS INDEX DOCD: CPT | Mod: CPTII,S$GLB,, | Performed by: NURSE PRACTITIONER

## 2021-04-23 PROCEDURE — 3008F PR BODY MASS INDEX (BMI) DOCUMENTED: ICD-10-PCS | Mod: CPTII,S$GLB,, | Performed by: NURSE PRACTITIONER

## 2021-04-23 PROCEDURE — 99214 PR OFFICE/OUTPT VISIT, EST, LEVL IV, 30-39 MIN: ICD-10-PCS | Mod: S$GLB,,, | Performed by: NURSE PRACTITIONER

## 2021-04-23 RX ORDER — CEFDINIR 300 MG/1
300 CAPSULE ORAL 2 TIMES DAILY
Qty: 20 CAPSULE | Refills: 0 | Status: SHIPPED | OUTPATIENT
Start: 2021-04-23 | End: 2021-04-28 | Stop reason: ALTCHOICE

## 2021-04-23 RX ORDER — METHYLPREDNISOLONE 4 MG/1
TABLET ORAL
Qty: 1 PACKAGE | Refills: 0 | Status: SHIPPED | OUTPATIENT
Start: 2021-04-23 | End: 2021-04-28

## 2021-04-28 ENCOUNTER — OFFICE VISIT (OUTPATIENT)
Dept: FAMILY MEDICINE | Facility: CLINIC | Age: 56
End: 2021-04-28
Payer: COMMERCIAL

## 2021-04-28 VITALS
HEIGHT: 64 IN | TEMPERATURE: 98 F | DIASTOLIC BLOOD PRESSURE: 78 MMHG | BODY MASS INDEX: 30.87 KG/M2 | WEIGHT: 180.81 LBS | HEART RATE: 84 BPM | OXYGEN SATURATION: 98 % | SYSTOLIC BLOOD PRESSURE: 122 MMHG

## 2021-04-28 DIAGNOSIS — H66.005 RECURRENT ACUTE SUPPURATIVE OTITIS MEDIA WITHOUT SPONTANEOUS RUPTURE OF LEFT TYMPANIC MEMBRANE: Primary | ICD-10-CM

## 2021-04-28 DIAGNOSIS — J32.9 RECURRENT SINUS INFECTIONS: ICD-10-CM

## 2021-04-28 DIAGNOSIS — I10 HYPERTENSION, UNSPECIFIED TYPE: ICD-10-CM

## 2021-04-28 PROCEDURE — 3078F DIAST BP <80 MM HG: CPT | Mod: S$GLB,,, | Performed by: NURSE PRACTITIONER

## 2021-04-28 PROCEDURE — 3008F PR BODY MASS INDEX (BMI) DOCUMENTED: ICD-10-PCS | Mod: S$GLB,,, | Performed by: NURSE PRACTITIONER

## 2021-04-28 PROCEDURE — 1125F PR PAIN SEVERITY QUANTIFIED, PAIN PRESENT: ICD-10-PCS | Mod: S$GLB,,, | Performed by: NURSE PRACTITIONER

## 2021-04-28 PROCEDURE — 99214 OFFICE O/P EST MOD 30 MIN: CPT | Mod: S$GLB,,, | Performed by: NURSE PRACTITIONER

## 2021-04-28 PROCEDURE — 1125F AMNT PAIN NOTED PAIN PRSNT: CPT | Mod: S$GLB,,, | Performed by: NURSE PRACTITIONER

## 2021-04-28 PROCEDURE — 3074F SYST BP LT 130 MM HG: CPT | Mod: S$GLB,,, | Performed by: NURSE PRACTITIONER

## 2021-04-28 PROCEDURE — 99214 PR OFFICE/OUTPT VISIT, EST, LEVL IV, 30-39 MIN: ICD-10-PCS | Mod: S$GLB,,, | Performed by: NURSE PRACTITIONER

## 2021-04-28 PROCEDURE — 3078F PR MOST RECENT DIASTOLIC BLOOD PRESSURE < 80 MM HG: ICD-10-PCS | Mod: S$GLB,,, | Performed by: NURSE PRACTITIONER

## 2021-04-28 PROCEDURE — 3008F BODY MASS INDEX DOCD: CPT | Mod: S$GLB,,, | Performed by: NURSE PRACTITIONER

## 2021-04-28 PROCEDURE — 3074F PR MOST RECENT SYSTOLIC BLOOD PRESSURE < 130 MM HG: ICD-10-PCS | Mod: S$GLB,,, | Performed by: NURSE PRACTITIONER

## 2021-04-28 RX ORDER — PREDNISONE 20 MG/1
TABLET ORAL
Qty: 11 TABLET | Refills: 0 | Status: SHIPPED | OUTPATIENT
Start: 2021-04-28 | End: 2021-05-08

## 2021-04-28 RX ORDER — MONTELUKAST SODIUM 10 MG/1
10 TABLET ORAL NIGHTLY
Qty: 30 TABLET | Refills: 0 | Status: SHIPPED | OUTPATIENT
Start: 2021-04-28 | End: 2021-05-28

## 2021-04-28 RX ORDER — AMOXICILLIN AND CLAVULANATE POTASSIUM 875; 125 MG/1; MG/1
1 TABLET, FILM COATED ORAL EVERY 12 HOURS
Qty: 20 TABLET | Refills: 0 | Status: SHIPPED | OUTPATIENT
Start: 2021-04-28 | End: 2021-09-13

## 2021-09-03 ENCOUNTER — PATIENT MESSAGE (OUTPATIENT)
Dept: FAMILY MEDICINE | Facility: CLINIC | Age: 56
End: 2021-09-03

## 2021-09-03 DIAGNOSIS — M25.551 CHRONIC RIGHT HIP PAIN: Primary | ICD-10-CM

## 2021-09-03 DIAGNOSIS — G89.29 CHRONIC RIGHT HIP PAIN: Primary | ICD-10-CM

## 2021-09-07 ENCOUNTER — PATIENT MESSAGE (OUTPATIENT)
Dept: FAMILY MEDICINE | Facility: CLINIC | Age: 56
End: 2021-09-07

## 2021-09-07 DIAGNOSIS — B36.0 TINEA VERSICOLOR: Primary | ICD-10-CM

## 2021-09-08 ENCOUNTER — PATIENT MESSAGE (OUTPATIENT)
Dept: FAMILY MEDICINE | Facility: CLINIC | Age: 56
End: 2021-09-08

## 2021-09-08 RX ORDER — FLUCONAZOLE 150 MG/1
150 TABLET ORAL WEEKLY
Qty: 4 TABLET | Refills: 0 | Status: SHIPPED | OUTPATIENT
Start: 2021-09-08 | End: 2021-11-03 | Stop reason: ALTCHOICE

## 2021-09-13 ENCOUNTER — HOSPITAL ENCOUNTER (OUTPATIENT)
Dept: RADIOLOGY | Facility: HOSPITAL | Age: 56
Discharge: HOME | End: 2021-09-13
Attending: ORTHOPAEDIC SURGERY
Payer: COMMERCIAL

## 2021-09-13 ENCOUNTER — OFFICE VISIT (OUTPATIENT)
Dept: ORTHOPEDICS | Facility: CLINIC | Age: 56
End: 2021-09-13
Payer: COMMERCIAL

## 2021-09-13 VITALS — WEIGHT: 180 LBS | RESPIRATION RATE: 16 BRPM | BODY MASS INDEX: 30.73 KG/M2 | HEIGHT: 64 IN

## 2021-09-13 DIAGNOSIS — G57.01 PYRIFORMIS SYNDROME, RIGHT: Primary | ICD-10-CM

## 2021-09-13 DIAGNOSIS — M25.551 CHRONIC RIGHT HIP PAIN: ICD-10-CM

## 2021-09-13 DIAGNOSIS — G89.29 CHRONIC RIGHT HIP PAIN: ICD-10-CM

## 2021-09-13 DIAGNOSIS — M25.551 RIGHT HIP PAIN: Primary | ICD-10-CM

## 2021-09-13 DIAGNOSIS — M25.551 RIGHT HIP PAIN: ICD-10-CM

## 2021-09-13 PROCEDURE — 3008F BODY MASS INDEX DOCD: CPT | Mod: CPTII,S$GLB,, | Performed by: ORTHOPAEDIC SURGERY

## 2021-09-13 PROCEDURE — 73502 X-RAY EXAM HIP UNI 2-3 VIEWS: CPT | Mod: 26,RT,, | Performed by: RADIOLOGY

## 2021-09-13 PROCEDURE — 1160F RVW MEDS BY RX/DR IN RCRD: CPT | Mod: CPTII,S$GLB,, | Performed by: ORTHOPAEDIC SURGERY

## 2021-09-13 PROCEDURE — 99204 OFFICE O/P NEW MOD 45 MIN: CPT | Mod: S$GLB,,, | Performed by: ORTHOPAEDIC SURGERY

## 2021-09-13 PROCEDURE — 99204 PR OFFICE/OUTPT VISIT, NEW, LEVL IV, 45-59 MIN: ICD-10-PCS | Mod: S$GLB,,, | Performed by: ORTHOPAEDIC SURGERY

## 2021-09-13 PROCEDURE — 99999 PR PBB SHADOW E&M-EST. PATIENT-LVL V: CPT | Mod: PBBFAC,,, | Performed by: ORTHOPAEDIC SURGERY

## 2021-09-13 PROCEDURE — 1160F PR REVIEW ALL MEDS BY PRESCRIBER/CLIN PHARMACIST DOCUMENTED: ICD-10-PCS | Mod: CPTII,S$GLB,, | Performed by: ORTHOPAEDIC SURGERY

## 2021-09-13 PROCEDURE — 1159F PR MEDICATION LIST DOCUMENTED IN MEDICAL RECORD: ICD-10-PCS | Mod: CPTII,S$GLB,, | Performed by: ORTHOPAEDIC SURGERY

## 2021-09-13 PROCEDURE — 3008F PR BODY MASS INDEX (BMI) DOCUMENTED: ICD-10-PCS | Mod: CPTII,S$GLB,, | Performed by: ORTHOPAEDIC SURGERY

## 2021-09-13 PROCEDURE — 73502 X-RAY EXAM HIP UNI 2-3 VIEWS: CPT | Mod: TC,PN,RT

## 2021-09-13 PROCEDURE — 99999 PR PBB SHADOW E&M-EST. PATIENT-LVL V: ICD-10-PCS | Mod: PBBFAC,,, | Performed by: ORTHOPAEDIC SURGERY

## 2021-09-13 PROCEDURE — 73502 XR HIP WITH PELVIS WHEN PERFORMED, 2 OR 3  VIEWS RIGHT: ICD-10-PCS | Mod: 26,RT,, | Performed by: RADIOLOGY

## 2021-09-13 PROCEDURE — 1159F MED LIST DOCD IN RCRD: CPT | Mod: CPTII,S$GLB,, | Performed by: ORTHOPAEDIC SURGERY

## 2021-09-13 RX ORDER — METHYLPREDNISOLONE 4 MG/1
TABLET ORAL
Qty: 1 PACKAGE | Refills: 0 | Status: SHIPPED | OUTPATIENT
Start: 2021-09-13 | End: 2021-11-03 | Stop reason: ALTCHOICE

## 2021-09-13 RX ORDER — KETOROLAC TROMETHAMINE 10 MG/1
10 TABLET, FILM COATED ORAL EVERY 6 HOURS
Qty: 20 TABLET | Refills: 0 | Status: SHIPPED | OUTPATIENT
Start: 2021-09-13 | End: 2021-09-18

## 2021-09-16 ENCOUNTER — CLINICAL SUPPORT (OUTPATIENT)
Dept: REHABILITATION | Facility: HOSPITAL | Age: 56
End: 2021-09-16
Attending: ORTHOPAEDIC SURGERY
Payer: COMMERCIAL

## 2021-09-16 DIAGNOSIS — G57.01 PYRIFORMIS SYNDROME, RIGHT: ICD-10-CM

## 2021-09-16 DIAGNOSIS — G89.29 CHRONIC RIGHT HIP PAIN: ICD-10-CM

## 2021-09-16 DIAGNOSIS — M25.551 CHRONIC RIGHT HIP PAIN: ICD-10-CM

## 2021-09-16 PROCEDURE — 97161 PT EVAL LOW COMPLEX 20 MIN: CPT | Mod: PN

## 2021-09-16 PROCEDURE — 97110 THERAPEUTIC EXERCISES: CPT | Mod: PN

## 2021-09-17 PROBLEM — M25.551 CHRONIC RIGHT HIP PAIN: Status: ACTIVE | Noted: 2021-09-17

## 2021-09-17 PROBLEM — G89.29 CHRONIC RIGHT HIP PAIN: Status: ACTIVE | Noted: 2021-09-17

## 2021-09-21 ENCOUNTER — CLINICAL SUPPORT (OUTPATIENT)
Dept: REHABILITATION | Facility: HOSPITAL | Age: 56
End: 2021-09-21
Payer: COMMERCIAL

## 2021-09-21 DIAGNOSIS — M25.551 CHRONIC RIGHT HIP PAIN: ICD-10-CM

## 2021-09-21 DIAGNOSIS — G89.29 CHRONIC RIGHT HIP PAIN: ICD-10-CM

## 2021-09-21 PROCEDURE — 97110 THERAPEUTIC EXERCISES: CPT | Mod: PN,CQ

## 2021-09-21 PROCEDURE — 97140 MANUAL THERAPY 1/> REGIONS: CPT | Mod: PN,CQ

## 2021-09-23 ENCOUNTER — CLINICAL SUPPORT (OUTPATIENT)
Dept: REHABILITATION | Facility: HOSPITAL | Age: 56
End: 2021-09-23
Payer: COMMERCIAL

## 2021-09-23 DIAGNOSIS — G89.29 CHRONIC RIGHT HIP PAIN: ICD-10-CM

## 2021-09-23 DIAGNOSIS — M25.551 CHRONIC RIGHT HIP PAIN: ICD-10-CM

## 2021-09-23 PROCEDURE — 97140 MANUAL THERAPY 1/> REGIONS: CPT | Mod: PN,CQ

## 2021-09-23 PROCEDURE — 97110 THERAPEUTIC EXERCISES: CPT | Mod: PN,CQ

## 2021-09-28 ENCOUNTER — CLINICAL SUPPORT (OUTPATIENT)
Dept: REHABILITATION | Facility: HOSPITAL | Age: 56
End: 2021-09-28
Attending: ORTHOPAEDIC SURGERY
Payer: COMMERCIAL

## 2021-09-28 DIAGNOSIS — M25.551 CHRONIC RIGHT HIP PAIN: ICD-10-CM

## 2021-09-28 DIAGNOSIS — G89.29 CHRONIC RIGHT HIP PAIN: ICD-10-CM

## 2021-09-28 PROCEDURE — 97140 MANUAL THERAPY 1/> REGIONS: CPT | Mod: PN,CQ

## 2021-09-28 PROCEDURE — 97110 THERAPEUTIC EXERCISES: CPT | Mod: PN,CQ

## 2021-09-29 ENCOUNTER — DOCUMENTATION ONLY (OUTPATIENT)
Dept: REHABILITATION | Facility: HOSPITAL | Age: 56
End: 2021-09-29

## 2021-09-30 ENCOUNTER — CLINICAL SUPPORT (OUTPATIENT)
Dept: REHABILITATION | Facility: HOSPITAL | Age: 56
End: 2021-09-30
Attending: ORTHOPAEDIC SURGERY
Payer: COMMERCIAL

## 2021-09-30 DIAGNOSIS — M25.551 CHRONIC RIGHT HIP PAIN: ICD-10-CM

## 2021-09-30 DIAGNOSIS — G89.29 CHRONIC RIGHT HIP PAIN: ICD-10-CM

## 2021-09-30 PROCEDURE — 97140 MANUAL THERAPY 1/> REGIONS: CPT | Mod: PN

## 2021-09-30 PROCEDURE — 97110 THERAPEUTIC EXERCISES: CPT | Mod: PN

## 2021-10-13 DIAGNOSIS — Z12.31 ENCOUNTER FOR SCREENING MAMMOGRAM FOR MALIGNANT NEOPLASM OF BREAST: Primary | ICD-10-CM

## 2021-10-19 ENCOUNTER — CLINICAL SUPPORT (OUTPATIENT)
Dept: REHABILITATION | Facility: HOSPITAL | Age: 56
End: 2021-10-19
Attending: ORTHOPAEDIC SURGERY
Payer: COMMERCIAL

## 2021-10-19 DIAGNOSIS — M25.551 CHRONIC RIGHT HIP PAIN: ICD-10-CM

## 2021-10-19 DIAGNOSIS — G89.29 CHRONIC RIGHT HIP PAIN: ICD-10-CM

## 2021-10-19 PROCEDURE — 97140 MANUAL THERAPY 1/> REGIONS: CPT | Mod: PN

## 2021-10-19 PROCEDURE — 97110 THERAPEUTIC EXERCISES: CPT | Mod: PN

## 2021-10-21 ENCOUNTER — CLINICAL SUPPORT (OUTPATIENT)
Dept: REHABILITATION | Facility: HOSPITAL | Age: 56
End: 2021-10-21
Attending: ORTHOPAEDIC SURGERY
Payer: COMMERCIAL

## 2021-10-21 DIAGNOSIS — M25.551 CHRONIC RIGHT HIP PAIN: ICD-10-CM

## 2021-10-21 DIAGNOSIS — G89.29 CHRONIC RIGHT HIP PAIN: ICD-10-CM

## 2021-10-21 PROCEDURE — 97110 THERAPEUTIC EXERCISES: CPT | Mod: PN

## 2021-10-24 ENCOUNTER — PATIENT MESSAGE (OUTPATIENT)
Dept: FAMILY MEDICINE | Facility: CLINIC | Age: 56
End: 2021-10-24
Payer: COMMERCIAL

## 2021-10-25 ENCOUNTER — HOSPITAL ENCOUNTER (OUTPATIENT)
Dept: RADIOLOGY | Facility: HOSPITAL | Age: 56
Discharge: HOME | End: 2021-10-25
Attending: NURSE PRACTITIONER
Payer: COMMERCIAL

## 2021-10-25 DIAGNOSIS — Z12.31 ENCOUNTER FOR SCREENING MAMMOGRAM FOR MALIGNANT NEOPLASM OF BREAST: ICD-10-CM

## 2021-10-25 PROCEDURE — 77067 SCR MAMMO BI INCL CAD: CPT | Mod: TC,PO

## 2021-11-03 ENCOUNTER — OFFICE VISIT (OUTPATIENT)
Dept: FAMILY MEDICINE | Facility: CLINIC | Age: 56
End: 2021-11-03
Payer: COMMERCIAL

## 2021-11-03 VITALS
DIASTOLIC BLOOD PRESSURE: 80 MMHG | HEART RATE: 58 BPM | SYSTOLIC BLOOD PRESSURE: 122 MMHG | OXYGEN SATURATION: 98 % | HEIGHT: 64 IN | BODY MASS INDEX: 30.13 KG/M2 | WEIGHT: 176.5 LBS | TEMPERATURE: 98 F

## 2021-11-03 DIAGNOSIS — Z00.00 ROUTINE HEALTH MAINTENANCE: Primary | ICD-10-CM

## 2021-11-03 DIAGNOSIS — I10 HYPERTENSION, UNSPECIFIED TYPE: Primary | ICD-10-CM

## 2021-11-03 PROCEDURE — 1160F PR REVIEW ALL MEDS BY PRESCRIBER/CLIN PHARMACIST DOCUMENTED: ICD-10-PCS | Mod: S$GLB,,, | Performed by: NURSE PRACTITIONER

## 2021-11-03 PROCEDURE — 3079F PR MOST RECENT DIASTOLIC BLOOD PRESSURE 80-89 MM HG: ICD-10-PCS | Mod: S$GLB,,, | Performed by: NURSE PRACTITIONER

## 2021-11-03 PROCEDURE — 3008F PR BODY MASS INDEX (BMI) DOCUMENTED: ICD-10-PCS | Mod: S$GLB,,, | Performed by: NURSE PRACTITIONER

## 2021-11-03 PROCEDURE — 1160F RVW MEDS BY RX/DR IN RCRD: CPT | Mod: S$GLB,,, | Performed by: NURSE PRACTITIONER

## 2021-11-03 PROCEDURE — 3074F SYST BP LT 130 MM HG: CPT | Mod: S$GLB,,, | Performed by: NURSE PRACTITIONER

## 2021-11-03 PROCEDURE — 3079F DIAST BP 80-89 MM HG: CPT | Mod: S$GLB,,, | Performed by: NURSE PRACTITIONER

## 2021-11-03 PROCEDURE — 3008F BODY MASS INDEX DOCD: CPT | Mod: S$GLB,,, | Performed by: NURSE PRACTITIONER

## 2021-11-03 PROCEDURE — 99396 PR PREVENTIVE VISIT,EST,40-64: ICD-10-PCS | Mod: S$GLB,,, | Performed by: NURSE PRACTITIONER

## 2021-11-03 PROCEDURE — 3074F PR MOST RECENT SYSTOLIC BLOOD PRESSURE < 130 MM HG: ICD-10-PCS | Mod: S$GLB,,, | Performed by: NURSE PRACTITIONER

## 2021-11-03 PROCEDURE — 99396 PREV VISIT EST AGE 40-64: CPT | Mod: S$GLB,,, | Performed by: NURSE PRACTITIONER

## 2021-11-03 RX ORDER — HYDROCHLOROTHIAZIDE 12.5 MG/1
12.5 TABLET ORAL DAILY PRN
Qty: 30 TABLET | Refills: 5 | Status: SHIPPED | OUTPATIENT
Start: 2021-11-03 | End: 2022-11-09

## 2021-11-11 LAB
ALBUMIN SERPL-MCNC: 4.2 G/DL (ref 3.6–5.1)
ALBUMIN/GLOB SERPL: 1.8 (CALC) (ref 1–2.5)
ALP SERPL-CCNC: 45 U/L (ref 37–153)
ALT SERPL-CCNC: 23 U/L (ref 6–29)
AST SERPL-CCNC: 36 U/L (ref 10–35)
BASOPHILS # BLD AUTO: 21 CELLS/UL (ref 0–200)
BASOPHILS NFR BLD AUTO: 0.6 %
BILIRUB SERPL-MCNC: 0.7 MG/DL (ref 0.2–1.2)
BUN SERPL-MCNC: 21 MG/DL (ref 7–25)
BUN/CREAT SERPL: ABNORMAL (CALC) (ref 6–22)
CALCIUM SERPL-MCNC: 9.1 MG/DL (ref 8.6–10.4)
CHLORIDE SERPL-SCNC: 104 MMOL/L (ref 98–110)
CHOLEST SERPL-MCNC: 155 MG/DL
CHOLEST/HDLC SERPL: 3.8 (CALC)
CO2 SERPL-SCNC: 28 MMOL/L (ref 20–32)
CREAT SERPL-MCNC: 0.75 MG/DL (ref 0.5–1.05)
EOSINOPHIL # BLD AUTO: 70 CELLS/UL (ref 15–500)
EOSINOPHIL NFR BLD AUTO: 2 %
ERYTHROCYTE [DISTWIDTH] IN BLOOD BY AUTOMATED COUNT: 11.9 % (ref 11–15)
GLOBULIN SER CALC-MCNC: 2.3 G/DL (CALC) (ref 1.9–3.7)
GLUCOSE SERPL-MCNC: 98 MG/DL (ref 65–99)
HCT VFR BLD AUTO: 40.1 % (ref 35–45)
HDLC SERPL-MCNC: 41 MG/DL
HGB BLD-MCNC: 14.1 G/DL (ref 11.7–15.5)
LDLC SERPL CALC-MCNC: 89 MG/DL (CALC)
LYMPHOCYTES # BLD AUTO: 1670 CELLS/UL (ref 850–3900)
LYMPHOCYTES NFR BLD AUTO: 47.7 %
MCH RBC QN AUTO: 33.9 PG (ref 27–33)
MCHC RBC AUTO-ENTMCNC: 35.2 G/DL (ref 32–36)
MCV RBC AUTO: 96.4 FL (ref 80–100)
MONOCYTES # BLD AUTO: 210 CELLS/UL (ref 200–950)
MONOCYTES NFR BLD AUTO: 6 %
NEUTROPHILS # BLD AUTO: 1530 CELLS/UL (ref 1500–7800)
NEUTROPHILS NFR BLD AUTO: 43.7 %
NONHDLC SERPL-MCNC: 114 MG/DL (CALC)
PLATELET # BLD AUTO: 198 THOUSAND/UL (ref 140–400)
PMV BLD REES-ECKER: 9.3 FL (ref 7.5–12.5)
POTASSIUM SERPL-SCNC: 4.2 MMOL/L (ref 3.5–5.3)
PROT SERPL-MCNC: 6.5 G/DL (ref 6.1–8.1)
RBC # BLD AUTO: 4.16 MILLION/UL (ref 3.8–5.1)
SODIUM SERPL-SCNC: 139 MMOL/L (ref 135–146)
TRIGL SERPL-MCNC: 158 MG/DL
TSH SERPL-ACNC: 1.43 MIU/L (ref 0.4–4.5)
WBC # BLD AUTO: 3.5 THOUSAND/UL (ref 3.8–10.8)

## 2021-12-23 ENCOUNTER — TELEPHONE (OUTPATIENT)
Dept: FAMILY MEDICINE | Facility: CLINIC | Age: 56
End: 2021-12-23
Payer: COMMERCIAL

## 2021-12-28 PROBLEM — M25.551 CHRONIC RIGHT HIP PAIN: Status: RESOLVED | Noted: 2021-09-17 | Resolved: 2021-12-28

## 2021-12-28 PROBLEM — G89.29 CHRONIC RIGHT HIP PAIN: Status: RESOLVED | Noted: 2021-09-17 | Resolved: 2021-12-28

## 2022-01-12 ENCOUNTER — PATIENT MESSAGE (OUTPATIENT)
Dept: FAMILY MEDICINE | Facility: CLINIC | Age: 57
End: 2022-01-12
Payer: COMMERCIAL

## 2022-01-12 DIAGNOSIS — J01.00 ACUTE NON-RECURRENT MAXILLARY SINUSITIS: Primary | ICD-10-CM

## 2022-01-13 RX ORDER — AMOXICILLIN 500 MG/1
500 CAPSULE ORAL EVERY 12 HOURS
Qty: 14 CAPSULE | Refills: 0 | Status: SHIPPED | OUTPATIENT
Start: 2022-01-13 | End: 2022-01-20

## 2022-01-19 ENCOUNTER — PATIENT MESSAGE (OUTPATIENT)
Dept: FAMILY MEDICINE | Facility: CLINIC | Age: 57
End: 2022-01-19
Payer: COMMERCIAL

## 2022-01-19 DIAGNOSIS — J01.40 ACUTE NON-RECURRENT PANSINUSITIS: Primary | ICD-10-CM

## 2022-01-20 RX ORDER — DOXYCYCLINE 100 MG/1
100 CAPSULE ORAL 2 TIMES DAILY
Qty: 20 CAPSULE | Refills: 0 | Status: SHIPPED | OUTPATIENT
Start: 2022-01-20 | End: 2022-01-30

## 2022-03-19 ENCOUNTER — OFFICE VISIT (OUTPATIENT)
Dept: URGENT CARE | Facility: CLINIC | Age: 57
End: 2022-03-19
Payer: COMMERCIAL

## 2022-03-19 VITALS
HEART RATE: 60 BPM | HEIGHT: 64 IN | SYSTOLIC BLOOD PRESSURE: 126 MMHG | BODY MASS INDEX: 30.42 KG/M2 | TEMPERATURE: 97 F | WEIGHT: 178.19 LBS | OXYGEN SATURATION: 98 % | RESPIRATION RATE: 16 BRPM | DIASTOLIC BLOOD PRESSURE: 80 MMHG

## 2022-03-19 DIAGNOSIS — Z13.9 ENCOUNTER FOR SCREENING: Primary | ICD-10-CM

## 2022-03-19 DIAGNOSIS — Z11.52 ENCOUNTER FOR SCREENING FOR COVID-19: ICD-10-CM

## 2022-03-19 LAB
CTP QC/QA: YES
SARS-COV-2 AG RESP QL IA.RAPID: NEGATIVE

## 2022-03-19 PROCEDURE — 3008F PR BODY MASS INDEX (BMI) DOCUMENTED: ICD-10-PCS | Mod: CPTII,S$GLB,, | Performed by: NURSE PRACTITIONER

## 2022-03-19 PROCEDURE — 1160F RVW MEDS BY RX/DR IN RCRD: CPT | Mod: CPTII,S$GLB,, | Performed by: NURSE PRACTITIONER

## 2022-03-19 PROCEDURE — 87811 SARS CORONAVIRUS 2 ANTIGEN POCT, MANUAL READ: ICD-10-PCS | Mod: S$GLB,,, | Performed by: NURSE PRACTITIONER

## 2022-03-19 PROCEDURE — 87811 SARS-COV-2 COVID19 W/OPTIC: CPT | Mod: S$GLB,,, | Performed by: NURSE PRACTITIONER

## 2022-03-19 PROCEDURE — 1159F PR MEDICATION LIST DOCUMENTED IN MEDICAL RECORD: ICD-10-PCS | Mod: CPTII,S$GLB,, | Performed by: NURSE PRACTITIONER

## 2022-03-19 PROCEDURE — 3079F DIAST BP 80-89 MM HG: CPT | Mod: CPTII,S$GLB,, | Performed by: NURSE PRACTITIONER

## 2022-03-19 PROCEDURE — 3074F PR MOST RECENT SYSTOLIC BLOOD PRESSURE < 130 MM HG: ICD-10-PCS | Mod: CPTII,S$GLB,, | Performed by: NURSE PRACTITIONER

## 2022-03-19 PROCEDURE — 3008F BODY MASS INDEX DOCD: CPT | Mod: CPTII,S$GLB,, | Performed by: NURSE PRACTITIONER

## 2022-03-19 PROCEDURE — 3079F PR MOST RECENT DIASTOLIC BLOOD PRESSURE 80-89 MM HG: ICD-10-PCS | Mod: CPTII,S$GLB,, | Performed by: NURSE PRACTITIONER

## 2022-03-19 PROCEDURE — 3074F SYST BP LT 130 MM HG: CPT | Mod: CPTII,S$GLB,, | Performed by: NURSE PRACTITIONER

## 2022-03-19 PROCEDURE — 1159F MED LIST DOCD IN RCRD: CPT | Mod: CPTII,S$GLB,, | Performed by: NURSE PRACTITIONER

## 2022-03-19 PROCEDURE — 99213 PR OFFICE/OUTPT VISIT, EST, LEVL III, 20-29 MIN: ICD-10-PCS | Mod: S$GLB,,, | Performed by: NURSE PRACTITIONER

## 2022-03-19 PROCEDURE — 1160F PR REVIEW ALL MEDS BY PRESCRIBER/CLIN PHARMACIST DOCUMENTED: ICD-10-PCS | Mod: CPTII,S$GLB,, | Performed by: NURSE PRACTITIONER

## 2022-03-19 PROCEDURE — 99213 OFFICE O/P EST LOW 20 MIN: CPT | Mod: S$GLB,,, | Performed by: NURSE PRACTITIONER

## 2022-03-19 NOTE — PROGRESS NOTES
"Subjective:       Patient ID: Daniela Lawson is a 56 y.o. female.    Vitals:  height is 5' 4" (1.626 m) and weight is 80.8 kg (178 lb 3.2 oz). Her oral temperature is 97.3 °F (36.3 °C). Her blood pressure is 126/80 and her pulse is 60. Her respiration is 16 and oxygen saturation is 98%.     Chief Complaint: covid screen (Covid screen for travel)    Patient is here for covid 19 screening prior to travel. No symptoms or exposure.  Past Medical History:  2009: Anesthesia      Comment:  Pt reports severe drop in BP when anesthesia given.  No date: Asthma  No date: Diverticulitis  No date: GERD (gastroesophageal reflux disease)  No date: Hypertension  2007: Neuropathy    Past Surgical History:  1990:  SECTION  No date: CHOLECYSTECTOMY  2003: HYSTERECTOMY  10/16/2019: INSERTION OF URETERAL CATHETER; N/A      Comment:  Procedure: INSERTION, CATHETER, URETER;  Surgeon: Willard Ruffin MD;  Location: [facility];  Service: Urology;                 Laterality: N/A;  10/16/2019: LAPAROSCOPIC SIGMOIDECTOMY; N/A      Comment:  Procedure: COLECTOMY, SIGMOID, LAPAROSCOPIC;  Surgeon:                Doug Gifford MD;  Location: [facility];  Service:                General;  Laterality: N/A;  No date: SHOULDER ARTHROSCOPY; Bilateral  No date: STOMACH SURGERY      Comment:  Possible Nissen per pt- To treat acid reflux   1990: TUBAL LIGATION    Review of patient's family history indicates:  Problem: Coronary artery disease      Relation: Mother          Age of Onset: (Not Specified)  Problem: Hyperlipidemia      Relation: Mother          Age of Onset: (Not Specified)  Problem: Other      Relation: Mother          Age of Onset: (Not Specified)          Comment: Breast Nodules  Problem: Arthritis      Relation: Mother          Age of Onset: (Not Specified)  Problem: Hypertension      Relation: Father          Age of Onset: (Not Specified)  Problem: Atrial fibrillation      Relation: Father          " Age of Onset: (Not Specified)  Problem: Neuropathy      Relation: Father          Age of Onset: (Not Specified)  Problem: Other      Relation: Father          Age of Onset: (Not Specified)          Comment: Bypass  Problem: Diabetes      Relation: Father          Age of Onset: (Not Specified)  Problem: Hypertension      Relation: Sister          Age of Onset: (Not Specified)  Problem: Cancer      Relation: Sister          Age of Onset: (Not Specified)          Comment: Breast Cacner  Problem: Neuropathy      Relation: Sister          Age of Onset: (Not Specified)  Problem: Breast cancer      Relation: Sister          Age of Onset: (Not Specified)  Problem: Hypertension      Relation: Daughter          Age of Onset: (Not Specified)  Problem: No Known Problems      Relation: Son          Age of Onset: (Not Specified)  Problem: Diabetes      Relation: Maternal Grandmother          Age of Onset: (Not Specified)  Problem: Hypertension      Relation: Maternal Grandmother          Age of Onset: (Not Specified)  Problem: No Known Problems      Relation: Maternal Grandfather          Age of Onset: (Not Specified)  Problem: Coronary artery disease      Relation: Paternal Grandmother          Age of Onset: (Not Specified)  Problem: Cancer      Relation: Paternal Grandmother          Age of Onset: (Not Specified)          Comment: Breast Cancer  Problem: Heart attack      Relation: Paternal Grandmother          Age of Onset: (Not Specified)  Problem: Breast cancer      Relation: Paternal Grandmother          Age of Onset: (Not Specified)  Problem: Dementia      Relation: Paternal Grandfather          Age of Onset: (Not Specified)  Problem: Heart attack      Relation: Paternal Grandfather          Age of Onset: (Not Specified)  Problem: No Known Problems      Relation: Sister          Age of Onset: (Not Specified)  Problem: Other      Relation: Daughter          Age of Onset: (Not Specified)          Comment: Benign  Hypertension(Brain)  Problem: Breast cancer      Relation: Maternal Aunt          Age of Onset: (Not Specified)  Problem: Breast cancer      Relation: Paternal Aunt          Age of Onset: (Not Specified)      Social History    Socioeconomic History      Marital status:       Number of children: 3    Tobacco Use      Smoking status: Never Smoker      Smokeless tobacco: Never Used    Substance and Sexual Activity      Alcohol use: Yes        Alcohol/week: 3.0 standard drinks        Types: 3 Glasses of wine per week        Comment: 3 glasses of wine per week       Drug use: Never    Social Determinants of Health  Financial Resource Strain: Low Risk       Difficulty of Paying Living Expenses: Not very hard  Food Insecurity: No Food Insecurity      Worried About Running Out of Food in the Last Year: Never true      Ran Out of Food in the Last Year: Never true  Transportation Needs: No Transportation Needs      Lack of Transportation (Medical): No      Lack of Transportation (Non-Medical): No  Physical Activity: Sufficiently Active      Days of Exercise per Week: 4 days      Minutes of Exercise per Session: 50 min  Stress: No Stress Concern Present      Feeling of Stress : Only a little  Social Connections: Unknown      Frequency of Communication with Friends and Family: More than three times a week      Frequency of Social Gatherings with Friends and Family: Three times a week      Active Member of Clubs or Organizations: Yes      Attends Club or Organization Meetings: More than 4 times per year      Marital Status:   Housing Stability: Low Risk       Unable to Pay for Housing in the Last Year: No      Number of Places Lived in the Last Year: 1      Unstable Housing in the Last Year: No    Current Outpatient Medications:  albuterol (PROVENTIL/VENTOLIN HFA) 90 mcg/actuation inhaler, Inhale 1-2 puffs into the lungs every 4 to 6 hours as needed for Wheezing or Shortness of Breath. Rescue, Disp: 18 g, Rfl:  3  amLODIPine (NORVASC) 2.5 MG tablet, TAKE 1 TABLET DAILY, Disp: 90 tablet, Rfl: 3  BYSTOLIC 5 mg Tab, TAKE 1 TABLET DAILY, Disp: 90 tablet, Rfl: 3  cetirizine 10 mg chewable tablet, Take 10 mg by mouth once daily., Disp: , Rfl:   cholecalciferol, vitamin D3, 125 mcg (5,000 unit) Tab, 5,000 Units once daily. , Disp: , Rfl:   cyanocobalamin, vitamin B-12, 5,000 mcg TbDL, Take 5,000 Units by mouth once daily. , Disp: , Rfl:   hydroCHLOROthiazide (HYDRODIURIL) 12.5 MG Tab, Take 1 tablet (12.5 mg total) by mouth daily as needed (leg swelling)., Disp: 30 tablet, Rfl: 5  magnesium oxide 500 mg Cap, Take 1 capsule by mouth once daily., Disp: , Rfl:   salmeteroL (SEREVENT) 50 mcg/dose diskus inhaler, Inhale 1 puff into the lungs 2 (two) times daily. Controller, Disp: 3 each, Rfl: 3  triamterene-hydrochlorothiazide 37.5-25 mg (MAXZIDE-25) 37.5-25 mg per tablet, TAKE 1 TABLET DAILY, Disp: 90 tablet, Rfl: 3  turmeric root extract 500 mg Cap, Take 500 mg by mouth once daily. , Disp: , Rfl:   VASCEPA 1 gram Cap, TAKE 2 CAPSULES (2 GRAMS) TWICE A DAY, Disp: 360 capsule, Rfl: 3    No current facility-administered medications for this visit.      Review of patient's allergies indicates:   -- Adhesive -- Hives   -- Lisinopril -- Other (See Comments)    --  Chest pain   -- Meloxicam    -- Meperidine    -- Oxycodone-acetaminophen       Constitution: Negative.   HENT: Negative.    Neck: neck negative.   Cardiovascular: Negative.    Respiratory: Negative.    Gastrointestinal: Negative.    Neurological: Negative.        Objective:      Physical Exam   Constitutional: She is oriented to person, place, and time.   HENT:   Head: Normocephalic and atraumatic.   Cardiovascular: Normal rate.   Pulmonary/Chest: Effort normal. No respiratory distress.   Abdominal: Normal appearance.   Neurological: She is alert and oriented to person, place, and time.   Psychiatric: Her behavior is normal. Mood normal.         Assessment:       1. Encounter for  screening    2. Encounter for screening for COVID-19          Plan:     Rapid Covid 19 test collected and resulted negative. Results discussed with patient, advised to follow up for any further concerns.         Encounter for screening    Encounter for screening for COVID-19  -     SARS Coronavirus 2 Antigen, POCT Manual Read

## 2022-06-14 ENCOUNTER — OFFICE VISIT (OUTPATIENT)
Dept: ORTHOPEDICS | Facility: CLINIC | Age: 57
End: 2022-06-14
Payer: COMMERCIAL

## 2022-06-14 VITALS — HEIGHT: 64 IN | WEIGHT: 178 LBS | BODY MASS INDEX: 30.39 KG/M2

## 2022-06-14 DIAGNOSIS — M77.02 MEDIAL EPICONDYLITIS OF LEFT ELBOW: Primary | ICD-10-CM

## 2022-06-14 PROCEDURE — 20551 NJX 1 TENDON ORIGIN/INSJ: CPT | Mod: LT,S$GLB,, | Performed by: ORTHOPAEDIC SURGERY

## 2022-06-14 PROCEDURE — 20551 TENDON ORIGIN: L ELBOW: ICD-10-PCS | Mod: LT,S$GLB,, | Performed by: ORTHOPAEDIC SURGERY

## 2022-06-14 PROCEDURE — 1159F PR MEDICATION LIST DOCUMENTED IN MEDICAL RECORD: ICD-10-PCS | Mod: CPTII,S$GLB,, | Performed by: ORTHOPAEDIC SURGERY

## 2022-06-14 PROCEDURE — 3008F PR BODY MASS INDEX (BMI) DOCUMENTED: ICD-10-PCS | Mod: CPTII,S$GLB,, | Performed by: ORTHOPAEDIC SURGERY

## 2022-06-14 PROCEDURE — 1159F MED LIST DOCD IN RCRD: CPT | Mod: CPTII,S$GLB,, | Performed by: ORTHOPAEDIC SURGERY

## 2022-06-14 PROCEDURE — 1160F PR REVIEW ALL MEDS BY PRESCRIBER/CLIN PHARMACIST DOCUMENTED: ICD-10-PCS | Mod: CPTII,S$GLB,, | Performed by: ORTHOPAEDIC SURGERY

## 2022-06-14 PROCEDURE — 1160F RVW MEDS BY RX/DR IN RCRD: CPT | Mod: CPTII,S$GLB,, | Performed by: ORTHOPAEDIC SURGERY

## 2022-06-14 PROCEDURE — 99213 PR OFFICE/OUTPT VISIT, EST, LEVL III, 20-29 MIN: ICD-10-PCS | Mod: 25,S$GLB,, | Performed by: ORTHOPAEDIC SURGERY

## 2022-06-14 PROCEDURE — 99213 OFFICE O/P EST LOW 20 MIN: CPT | Mod: 25,S$GLB,, | Performed by: ORTHOPAEDIC SURGERY

## 2022-06-14 PROCEDURE — 3008F BODY MASS INDEX DOCD: CPT | Mod: CPTII,S$GLB,, | Performed by: ORTHOPAEDIC SURGERY

## 2022-06-14 RX ORDER — TRIAMCINOLONE ACETONIDE 40 MG/ML
40 INJECTION, SUSPENSION INTRA-ARTICULAR; INTRAMUSCULAR
Status: DISCONTINUED | OUTPATIENT
Start: 2022-06-14 | End: 2022-06-14 | Stop reason: HOSPADM

## 2022-06-14 RX ORDER — ASPIRIN 81 MG/1
81 TABLET ORAL DAILY
COMMUNITY

## 2022-06-14 RX ADMIN — TRIAMCINOLONE ACETONIDE 40 MG: 40 INJECTION, SUSPENSION INTRA-ARTICULAR; INTRAMUSCULAR at 12:06

## 2022-06-14 NOTE — PROGRESS NOTES
[facility]    Subjective:     Chief Complaint:   Chief Complaint   Patient presents with    Left Elbow - Pain     Pt is here with complaints of Left Elbow pain x 2 months has pain when picking up grand kids, gripping and pull weights, Pushing and pulling , pain goes away when she doesn't use it.        Past Medical History:   Diagnosis Date    Anesthesia     Pt reports severe drop in BP when anesthesia given.    Asthma     Diverticulitis     GERD (gastroesophageal reflux disease)     Hypertension     Neuropathy        Past Surgical History:   Procedure Laterality Date     SECTION  1990    CHOLECYSTECTOMY      HYSTERECTOMY      INSERTION OF URETERAL CATHETER N/A 10/16/2019    Procedure: INSERTION, CATHETER, URETER;  Surgeon: Willard Ruffin MD;  Location: [facility];  Service: Urology;  Laterality: N/A;    LAPAROSCOPIC SIGMOIDECTOMY N/A 10/16/2019    Procedure: COLECTOMY, SIGMOID, LAPAROSCOPIC;  Surgeon: Doug Gifford MD;  Location: [facility] OR;  Service: General;  Laterality: N/A;    SHOULDER ARTHROSCOPY Bilateral     STOMACH SURGERY      Possible Nissen per pt- To treat acid reflux     TUBAL LIGATION  1990       Current Outpatient Medications   Medication Sig    albuterol (PROVENTIL/VENTOLIN HFA) 90 mcg/actuation inhaler Inhale 1-2 puffs into the lungs every 4 to 6 hours as needed for Wheezing or Shortness of Breath. Rescue    amLODIPine (NORVASC) 2.5 MG tablet TAKE 1 TABLET DAILY    aspirin (ECOTRIN) 81 MG EC tablet Take 81 mg by mouth once daily.    BYSTOLIC 5 mg Tab TAKE 1 TABLET DAILY    cetirizine 10 mg chewable tablet Take 10 mg by mouth once daily.    cholecalciferol, vitamin D3, 125 mcg (5,000 unit) Tab 5,000 Units once daily.     cyanocobalamin, vitamin B-12, 5,000 mcg TbDL Take 5,000 Units by mouth once daily.     hydroCHLOROthiazide (HYDRODIURIL) 12.5 MG Tab Take 1 tablet (12.5 mg total) by mouth daily as needed (leg swelling).     magnesium oxide 500 mg Cap Take 1 capsule by mouth once daily.    salmeteroL (SEREVENT) 50 mcg/dose diskus inhaler Inhale 1 puff into the lungs 2 (two) times daily. Controller    triamterene-hydrochlorothiazide 37.5-25 mg (MAXZIDE-25) 37.5-25 mg per tablet TAKE 1 TABLET DAILY    turmeric root extract 500 mg Cap Take 500 mg by mouth once daily.     VASCEPA 1 gram Cap TAKE 2 CAPSULES (2 GRAMS) TWICE A DAY     No current facility-administered medications for this visit.       Review of patient's allergies indicates:   Allergen Reactions    Adhesive Hives    Lisinopril Other (See Comments)     Chest pain    Meloxicam     Meperidine     Oxycodone-acetaminophen        Family History   Problem Relation Age of Onset    Coronary artery disease Mother     Hyperlipidemia Mother     Other Mother         Breast Nodules    Arthritis Mother     Hypertension Father     Atrial fibrillation Father     Neuropathy Father     Other Father         Bypass    Diabetes Father     Hypertension Sister     Cancer Sister         Breast Cacner    Neuropathy Sister     Breast cancer Sister     Hypertension Daughter     No Known Problems Son     Diabetes Maternal Grandmother     Hypertension Maternal Grandmother     No Known Problems Maternal Grandfather     Coronary artery disease Paternal Grandmother     Cancer Paternal Grandmother         Breast Cancer    Heart attack Paternal Grandmother     Breast cancer Paternal Grandmother     Dementia Paternal Grandfather     Heart attack Paternal Grandfather     No Known Problems Sister     Other Daughter         Benign Hypertension(Brain)    Breast cancer Maternal Aunt     Breast cancer Paternal Aunt        Social History     Socioeconomic History    Marital status:     Number of children: 3   Tobacco Use    Smoking status: Never Smoker    Smokeless tobacco: Never Used   Substance and Sexual Activity    Alcohol use: Yes     Alcohol/week: 3.0 standard  drinks     Types: 3 Glasses of wine per week     Comment: 3 glasses of wine per week     Drug use: Never     Social Determinants of Health     Financial Resource Strain: Low Risk     Difficulty of Paying Living Expenses: Not very hard   Food Insecurity: No Food Insecurity    Worried About Running Out of Food in the Last Year: Never true    Ran Out of Food in the Last Year: Never true   Transportation Needs: No Transportation Needs    Lack of Transportation (Medical): No    Lack of Transportation (Non-Medical): No   Physical Activity: Sufficiently Active    Days of Exercise per Week: 4 days    Minutes of Exercise per Session: 50 min   Stress: No Stress Concern Present    Feeling of Stress : Only a little   Social Connections: Unknown    Frequency of Communication with Friends and Family: More than three times a week    Frequency of Social Gatherings with Friends and Family: Three times a week    Active Member of Clubs or Organizations: Yes    Attends Club or Organization Meetings: More than 4 times per year    Marital Status:    Housing Stability: Low Risk     Unable to Pay for Housing in the Last Year: No    Number of Places Lived in the Last Year: 1    Unstable Housing in the Last Year: No       History of present illness:  Daniela presents to the clinic today with chief complaint of left medial elbow pain that has been going on for several months.  She denies any specific injury or trauma.  She does report she feels exacerbation of the pain as she picks up her grandkids.  She has not tried any formal treatment.    Review of Systems:    Constitution: Negative for chills, fever, and sweats.  Negative for unexplained weight loss.    HENT:  Negative for headaches and blurry vision.    Cardiovascular:Negative for chest pain or irregular heart beat. Negative for hypertension.    Respiratory:  Negative for cough and shortness of breath.    Gastrointestinal: Negative for abdominal pain, heartburn,  melena, nausea, and vomitting.    Genitourinary:  Negative bladder incontinence and dysuria.    Musculoskeletal:  See HPI for details.     Neurological: Negative for numbness.    Psychiatric/Behavioral: Negative for depression.  The patient is not nervous/anxious.      Endocrine: Negative for polyuria    Hematologic/Lymphatic: Negative for bleeding problem.  Does not bruise/bleed easily.    Skin: Negative for poor would healing and rash    Objective:      Physical Examination:    Vital Signs:  There were no vitals filed for this visit.    Body mass index is 30.55 kg/m².    This a well-developed, well nourished patient in no acute distress.  They are alert and oriented and cooperative to examination.        Left elbow exam:  Skin left elbow is clean dry and intact.  There is no erythema or ecchymosis.  There are no signs or symptoms of infection.  Patient is neurovascularly intact throughout the left upper extremity.  She has full active range of motion of left elbow with flexion/extension and left forearm pronation/supination.   strength in her left hand with grade 5/5 in is equal bilaterally.  She can oppose her left thumb to all digits in her left hand without difficulty.  She is nontender over her left lateral epicondyle.  She is most tender to palpation and she localizes this as her point of maximal tenderness over her medial epicondyle.    Pertinent New Results:    XRAY Report / Interpretation:   Two views were taken of the left elbow today:  AP and lateral views.  They reveal no acute fractures or dislocations.  Visualized soft tissues are unremarkable.    Assessment/Plan:      1.  Left medial epicondylitis.    Injected the patient's left medial epicondylar region today with 40 mg of Kenalog and lidocaine.  She tolerated this well.  Advised use an over-the-counter NSAID of her choice as an adjunct an to obtain rehabilitation type exercises online for medial epicondylitis as a treatment adjuncts.  We will  "see her back in 6 weeks to see how she is doing.  I did advise her that this can be belligerent at times and require multiple injections to resolve.  It is extremely rare that this is a surgical condition.    Gee Obrien, Physician Assistant, served in the capacity as a "scribe" for this patient encounter.  A "face-to-face" encounter occurred with Dr. Andriy Schwartz on this date.  The treatment plan and medical decision-making is outlined above. Patient was seen and examined with a chaperone.       This note was created using Dragon voice recognition software that occasionally misinterpreted phrases or words.          "

## 2022-06-14 NOTE — PROCEDURES
Tendon Origin: L elbow    Date/Time: 6/14/2022 12:00 PM  Performed by: Andriy Schwartz MD  Authorized by: Andriy Schwartz MD     Consent Done?:  Yes (Verbal)  Timeout: prior to procedure the correct patient, procedure, and site was verified    Indications:  Pain  Site marked: the procedure site was marked    Timeout: prior to procedure the correct patient, procedure, and site was verified    Location:  Elbow  Site:  L elbow  Prep: patient was prepped and draped in usual sterile fashion    Needle size:  25 G  Medications:  40 mg triamcinolone acetonide 40 mg/mL  Patient tolerance:  Patient tolerated the procedure well with no immediate complications

## 2022-07-25 ENCOUNTER — PATIENT MESSAGE (OUTPATIENT)
Dept: ORTHOPEDICS | Facility: CLINIC | Age: 57
End: 2022-07-25

## 2022-07-26 ENCOUNTER — OFFICE VISIT (OUTPATIENT)
Dept: ORTHOPEDICS | Facility: CLINIC | Age: 57
End: 2022-07-26
Payer: COMMERCIAL

## 2022-07-26 VITALS — WEIGHT: 178 LBS | BODY MASS INDEX: 30.39 KG/M2 | HEIGHT: 64 IN

## 2022-07-26 DIAGNOSIS — M65.9 SYNOVITIS OF RIGHT KNEE: ICD-10-CM

## 2022-07-26 DIAGNOSIS — S83.241A ACUTE MEDIAL MENISCUS TEAR OF RIGHT KNEE, INITIAL ENCOUNTER: ICD-10-CM

## 2022-07-26 DIAGNOSIS — M77.02 MEDIAL EPICONDYLITIS OF LEFT ELBOW: Primary | ICD-10-CM

## 2022-07-26 PROCEDURE — 20610 LARGE JOINT ASPIRATION/INJECTION: R KNEE: ICD-10-PCS | Mod: RT,S$GLB,, | Performed by: ORTHOPAEDIC SURGERY

## 2022-07-26 PROCEDURE — 1159F PR MEDICATION LIST DOCUMENTED IN MEDICAL RECORD: ICD-10-PCS | Mod: CPTII,S$GLB,, | Performed by: ORTHOPAEDIC SURGERY

## 2022-07-26 PROCEDURE — 1159F MED LIST DOCD IN RCRD: CPT | Mod: CPTII,S$GLB,, | Performed by: ORTHOPAEDIC SURGERY

## 2022-07-26 PROCEDURE — 99213 PR OFFICE/OUTPT VISIT, EST, LEVL III, 20-29 MIN: ICD-10-PCS | Mod: 25,S$GLB,, | Performed by: ORTHOPAEDIC SURGERY

## 2022-07-26 PROCEDURE — 3008F PR BODY MASS INDEX (BMI) DOCUMENTED: ICD-10-PCS | Mod: CPTII,S$GLB,, | Performed by: ORTHOPAEDIC SURGERY

## 2022-07-26 PROCEDURE — 1160F PR REVIEW ALL MEDS BY PRESCRIBER/CLIN PHARMACIST DOCUMENTED: ICD-10-PCS | Mod: CPTII,S$GLB,, | Performed by: ORTHOPAEDIC SURGERY

## 2022-07-26 PROCEDURE — 20610 DRAIN/INJ JOINT/BURSA W/O US: CPT | Mod: RT,S$GLB,, | Performed by: ORTHOPAEDIC SURGERY

## 2022-07-26 PROCEDURE — 3008F BODY MASS INDEX DOCD: CPT | Mod: CPTII,S$GLB,, | Performed by: ORTHOPAEDIC SURGERY

## 2022-07-26 PROCEDURE — 1160F RVW MEDS BY RX/DR IN RCRD: CPT | Mod: CPTII,S$GLB,, | Performed by: ORTHOPAEDIC SURGERY

## 2022-07-26 PROCEDURE — 99213 OFFICE O/P EST LOW 20 MIN: CPT | Mod: 25,S$GLB,, | Performed by: ORTHOPAEDIC SURGERY

## 2022-07-26 RX ORDER — RANIBIZUMAB 10 MG/ML
INJECTION, SOLUTION INTRAVITREAL
COMMUNITY
Start: 2022-07-15

## 2022-07-26 RX ORDER — TRIAMCINOLONE ACETONIDE 40 MG/ML
40 INJECTION, SUSPENSION INTRA-ARTICULAR; INTRAMUSCULAR
Status: DISCONTINUED | OUTPATIENT
Start: 2022-07-26 | End: 2022-07-26 | Stop reason: HOSPADM

## 2022-07-26 RX ADMIN — TRIAMCINOLONE ACETONIDE 40 MG: 40 INJECTION, SUSPENSION INTRA-ARTICULAR; INTRAMUSCULAR at 07:07

## 2022-07-26 NOTE — PROCEDURES
Large Joint Aspiration/Injection: R knee    Date/Time: 7/26/2022 7:45 AM  Performed by: Andriy Schwartz MD  Authorized by: Andriy Schwartz MD     Consent Done?:  Yes (Verbal)  Indications:  Pain  Site marked: the procedure site was marked    Timeout: prior to procedure the correct patient, procedure, and site was verified    Prep: patient was prepped and draped in usual sterile fashion      Local anesthesia used?: Yes    Local anesthetic:  Lidocaine 1% without epinephrine  Ultrasonic Guidance for needle placement?: No    Location:  Knee  Site:  R knee  Medications:  40 mg triamcinolone acetonide 40 mg/mL  Patient tolerance:  Patient tolerated the procedure well with no immediate complications

## 2022-07-26 NOTE — PROGRESS NOTES
[facility]    Subjective:     Chief Complaint:   Chief Complaint   Patient presents with    Left Elbow - Pain     Left elbow pain follow up. Received inj 22 which offered relief.    Right Knee - Pain     Right knee pain that started about 3 weeks ago after she stretched while getting up. Complains that the pain is intermediate, but occurs in different areas of the knee       Past Medical History:   Diagnosis Date    Anesthesia 2009    Pt reports severe drop in BP when anesthesia given.    Asthma     Diverticulitis     GERD (gastroesophageal reflux disease)     Hypertension     Neuropathy     Stroke     RETINACULAR        Past Surgical History:   Procedure Laterality Date     SECTION  1990    CHOLECYSTECTOMY      HYSTERECTOMY      INSERTION OF URETERAL CATHETER N/A 10/16/2019    Procedure: INSERTION, CATHETER, URETER;  Surgeon: Willard Ruffin MD;  Location: [facility] OR;  Service: Urology;  Laterality: N/A;    LAPAROSCOPIC SIGMOIDECTOMY N/A 10/16/2019    Procedure: COLECTOMY, SIGMOID, LAPAROSCOPIC;  Surgeon: Doug Gifford MD;  Location: [facility] OR;  Service: General;  Laterality: N/A;    SHOULDER ARTHROSCOPY Bilateral     STOMACH SURGERY      Possible Nissen per pt- To treat acid reflux     TUBAL LIGATION  1990       Current Outpatient Medications   Medication Sig    amLODIPine (NORVASC) 2.5 MG tablet TAKE 1 TABLET DAILY    aspirin (ECOTRIN) 81 MG EC tablet Take 81 mg by mouth once daily.    BYSTOLIC 5 mg Tab TAKE 1 TABLET DAILY    cetirizine 10 mg chewable tablet Take 10 mg by mouth once daily.    cholecalciferol, vitamin D3, 125 mcg (5,000 unit) Tab 5,000 Units once daily.     cyanocobalamin, vitamin B-12, 5,000 mcg TbDL Take 5,000 Units by mouth once daily.     hydroCHLOROthiazide (HYDRODIURIL) 12.5 MG Tab Take 1 tablet (12.5 mg total) by mouth daily as needed (leg swelling).    magnesium oxide 500 mg Cap Take 1 capsule by mouth once daily.     salmeteroL (SEREVENT) 50 mcg/dose diskus inhaler Inhale 1 puff into the lungs 2 (two) times daily. Controller    triamterene-hydrochlorothiazide 37.5-25 mg (MAXZIDE-25) 37.5-25 mg per tablet TAKE 1 TABLET DAILY    turmeric root extract 500 mg Cap Take 500 mg by mouth once daily.     VASCEPA 1 gram Cap TAKE 2 CAPSULES (2 GRAMS) TWICE A DAY    albuterol (PROVENTIL/VENTOLIN HFA) 90 mcg/actuation inhaler Inhale 1-2 puffs into the lungs every 4 to 6 hours as needed for Wheezing or Shortness of Breath. Rescue    LUCENTIS 0.5 mg/0.05 mL Syrg injection      No current facility-administered medications for this visit.       Review of patient's allergies indicates:   Allergen Reactions    Adhesive Hives    Lisinopril Other (See Comments)     Chest pain    Meloxicam     Meperidine     Oxycodone-acetaminophen        Family History   Problem Relation Age of Onset    Coronary artery disease Mother     Hyperlipidemia Mother     Other Mother         Breast Nodules    Arthritis Mother     Hypertension Father     Atrial fibrillation Father     Neuropathy Father     Other Father         Bypass    Diabetes Father     Hypertension Sister     Cancer Sister         Breast Cacner    Neuropathy Sister     Breast cancer Sister     Hypertension Daughter     No Known Problems Son     Diabetes Maternal Grandmother     Hypertension Maternal Grandmother     No Known Problems Maternal Grandfather     Coronary artery disease Paternal Grandmother     Cancer Paternal Grandmother         Breast Cancer    Heart attack Paternal Grandmother     Breast cancer Paternal Grandmother     Dementia Paternal Grandfather     Heart attack Paternal Grandfather     No Known Problems Sister     Other Daughter         Benign Hypertension(Brain)    Breast cancer Maternal Aunt     Breast cancer Paternal Aunt        Social History     Socioeconomic History    Marital status:     Number of children: 3   Tobacco Use     Smoking status: Never Smoker    Smokeless tobacco: Never Used   Substance and Sexual Activity    Alcohol use: Yes     Alcohol/week: 3.0 standard drinks     Types: 3 Glasses of wine per week     Comment: 3 glasses of wine per week     Drug use: Never     Social Determinants of Health     Financial Resource Strain: Low Risk     Difficulty of Paying Living Expenses: Not very hard   Food Insecurity: No Food Insecurity    Worried About Running Out of Food in the Last Year: Never true    Ran Out of Food in the Last Year: Never true   Transportation Needs: No Transportation Needs    Lack of Transportation (Medical): No    Lack of Transportation (Non-Medical): No   Physical Activity: Sufficiently Active    Days of Exercise per Week: 4 days    Minutes of Exercise per Session: 50 min   Stress: No Stress Concern Present    Feeling of Stress : Only a little   Social Connections: Unknown    Frequency of Communication with Friends and Family: More than three times a week    Frequency of Social Gatherings with Friends and Family: Three times a week    Active Member of Clubs or Organizations: Yes    Attends Club or Organization Meetings: More than 4 times per year    Marital Status:    Housing Stability: Low Risk     Unable to Pay for Housing in the Last Year: No    Number of Places Lived in the Last Year: 1    Unstable Housing in the Last Year: No       History of present illness:  Daniela comes in today for follow-up left elbow medial epicondylitis and a new complaint of right knee pain, medially.  She was last seen and injected on her left elbow medial epicondyle approximately 6 weeks ago with great relief of her symptoms.  She continues to be asymptomatic and is pleased with the result from the injection.  Unfortunately, she has begun to experience right medial knee pain for approximately 3 weeks that began after a pivoting/twisting injury while walking.  She denies any trip a fall.  She has not had  any formal treatment yet.    Review of Systems:    Constitution: Negative for chills, fever, and sweats.  Negative for unexplained weight loss.    HENT:  Negative for headaches and blurry vision.    Cardiovascular:Negative for chest pain or irregular heart beat. Negative for hypertension.    Respiratory:  Negative for cough and shortness of breath.    Gastrointestinal: Negative for abdominal pain, heartburn, melena, nausea, and vomitting.    Genitourinary:  Negative bladder incontinence and dysuria.    Musculoskeletal:  See HPI for details.     Neurological: Negative for numbness.    Psychiatric/Behavioral: Negative for depression.  The patient is not nervous/anxious.      Endocrine: Negative for polyuria    Hematologic/Lymphatic: Negative for bleeding problem.  Does not bruise/bleed easily.    Skin: Negative for poor would healing and rash    Objective:      Physical Examination:    Vital Signs:  There were no vitals filed for this visit.    Body mass index is 30.55 kg/m².    This a well-developed, well nourished patient in no acute distress.  They are alert and oriented and cooperative to examination.        Left elbow exam:  Skin left elbow is clean dry and intact.  There is no erythema or ecchymosis.  There are no signs or symptoms of infection.  Patient is neurovascularly intact throughout the left upper extremity.  She has full active range of motion left elbow with flexion/extension of left forearm pronation/supination.  She is nontender about the medial and lateral epicondyles.  She can fully open and close her left hand into a fist.  She can oppose her left thumb to all digits in the left hand without difficulty or pain.    Right knee exam:  Skin to right knee is clean dry and intact.  There is no erythema or ecchymosis.  There are no signs or symptoms of infection.  Patient is neurovascularly intact throughout the right lower extremity.  Right calf is soft and nontender.  Right knee active range of motion  "is approximately 0-110 degrees.  She does have a mild effusion of the right knee.  Her right knee is stable to varus and valgus stresses while held in extension.  She has a negative straight leg raise on the right.  She has well-preserved internal/external rotation of her right hip without pain.    Pertinent New Results:    XRAY Report / Interpretation:   Three views were taken of the right knee today:  AP, lateral, and sunrise views.  They reveal no acute fractures or dislocations.  Visualized soft tissues are unremarkable.    Assessment/Plan:      1.  Right knee medial meniscus tear.    I injected the patient's right knee today via an anterolateral approach with 40 mg of Kenalog and lidocaine.  She tolerated this well.  Like to have her follow-up in 6 weeks to see how she is doing with this injection.  Also encouraged her to take an over-the-counter NSAID of her choice as directed.  She is not much improved with these treatment modalities, consideration of advanced imaging with an MRI may be warranted to evaluate for medial meniscus tear.  She may simply have synovitis in the knee.  If so, the cortisone shot should be very efficacious for her.    Gee Obrien, Physician Assistant, served in the capacity as a "scribe" for this patient encounter.  A "face-to-face" encounter occurred with Dr. Andriy Schwartz on this date.  The treatment plan and medical decision-making is outlined above. Patient was seen and examined with a chaperone.       This note was created using Dragon voice recognition software that occasionally misinterpreted phrases or words.        "

## 2022-08-03 ENCOUNTER — PATIENT MESSAGE (OUTPATIENT)
Dept: FAMILY MEDICINE | Facility: CLINIC | Age: 57
End: 2022-08-03

## 2022-08-17 ENCOUNTER — TELEPHONE (OUTPATIENT)
Dept: FAMILY MEDICINE | Facility: CLINIC | Age: 57
End: 2022-08-17

## 2022-08-17 NOTE — TELEPHONE ENCOUNTER
Patient left message about follow up appointment regarding blood pressure medication.   Message sent through portal to patient.

## 2022-08-22 ENCOUNTER — OFFICE VISIT (OUTPATIENT)
Dept: FAMILY MEDICINE | Facility: CLINIC | Age: 57
End: 2022-08-22
Payer: COMMERCIAL

## 2022-08-22 VITALS
DIASTOLIC BLOOD PRESSURE: 76 MMHG | SYSTOLIC BLOOD PRESSURE: 126 MMHG | HEIGHT: 64 IN | WEIGHT: 176 LBS | BODY MASS INDEX: 30.05 KG/M2 | OXYGEN SATURATION: 97 % | HEART RATE: 62 BPM

## 2022-08-22 DIAGNOSIS — M79.672 ACUTE FOOT PAIN, LEFT: ICD-10-CM

## 2022-08-22 DIAGNOSIS — I10 HYPERTENSION, UNSPECIFIED TYPE: Primary | ICD-10-CM

## 2022-08-22 PROCEDURE — 3074F PR MOST RECENT SYSTOLIC BLOOD PRESSURE < 130 MM HG: ICD-10-PCS | Mod: CPTII,S$GLB,, | Performed by: NURSE PRACTITIONER

## 2022-08-22 PROCEDURE — 1160F PR REVIEW ALL MEDS BY PRESCRIBER/CLIN PHARMACIST DOCUMENTED: ICD-10-PCS | Mod: CPTII,S$GLB,, | Performed by: NURSE PRACTITIONER

## 2022-08-22 PROCEDURE — 3008F PR BODY MASS INDEX (BMI) DOCUMENTED: ICD-10-PCS | Mod: CPTII,S$GLB,, | Performed by: NURSE PRACTITIONER

## 2022-08-22 PROCEDURE — 99214 PR OFFICE/OUTPT VISIT, EST, LEVL IV, 30-39 MIN: ICD-10-PCS | Mod: S$GLB,,, | Performed by: NURSE PRACTITIONER

## 2022-08-22 PROCEDURE — 1160F RVW MEDS BY RX/DR IN RCRD: CPT | Mod: CPTII,S$GLB,, | Performed by: NURSE PRACTITIONER

## 2022-08-22 PROCEDURE — 3078F PR MOST RECENT DIASTOLIC BLOOD PRESSURE < 80 MM HG: ICD-10-PCS | Mod: CPTII,S$GLB,, | Performed by: NURSE PRACTITIONER

## 2022-08-22 PROCEDURE — 3008F BODY MASS INDEX DOCD: CPT | Mod: CPTII,S$GLB,, | Performed by: NURSE PRACTITIONER

## 2022-08-22 PROCEDURE — 99214 OFFICE O/P EST MOD 30 MIN: CPT | Mod: S$GLB,,, | Performed by: NURSE PRACTITIONER

## 2022-08-22 PROCEDURE — 1159F PR MEDICATION LIST DOCUMENTED IN MEDICAL RECORD: ICD-10-PCS | Mod: CPTII,S$GLB,, | Performed by: NURSE PRACTITIONER

## 2022-08-22 PROCEDURE — 3078F DIAST BP <80 MM HG: CPT | Mod: CPTII,S$GLB,, | Performed by: NURSE PRACTITIONER

## 2022-08-22 PROCEDURE — 3074F SYST BP LT 130 MM HG: CPT | Mod: CPTII,S$GLB,, | Performed by: NURSE PRACTITIONER

## 2022-08-22 PROCEDURE — 1159F MED LIST DOCD IN RCRD: CPT | Mod: CPTII,S$GLB,, | Performed by: NURSE PRACTITIONER

## 2022-08-22 RX ORDER — NEBIVOLOL 5 MG/1
5 TABLET ORAL DAILY
Qty: 90 TABLET | Refills: 0 | Status: SHIPPED | OUTPATIENT
Start: 2022-08-22 | End: 2022-11-09 | Stop reason: SDUPTHER

## 2022-08-22 RX ORDER — AMLODIPINE BESYLATE 2.5 MG/1
2.5 TABLET ORAL DAILY
Qty: 90 TABLET | Refills: 0 | Status: SHIPPED | OUTPATIENT
Start: 2022-08-22 | End: 2022-11-09 | Stop reason: SDUPTHER

## 2022-08-22 NOTE — PROGRESS NOTES
SUBJECTIVE:      Patient ID: Daniela Lawson is a 57 y.o. female.    Chief Complaint: Medication Adjustment (Adjust -- Blood Pressure medication ) and Foot Pain (X-ray on the left foot if possible - missed a step on the stairs yesterday )    Presents for problem visit - states she recently had stroke in L eye & was told by Dr. Sheridan that it was directly r/t her BP, she states she is well-controlled on her current regimen & is concerned about exercising/strenous activity, she is currently getting injections in her L eye but is worried she may not be able to continue them d/t cost    Foot Pain  This is a new problem. The current episode started in the past 7 days. The problem occurs constantly. The problem has been unchanged. Associated symptoms include arthralgias, joint swelling and myalgias. Pertinent negatives include no abdominal pain, chest pain, congestion, coughing, fatigue, nausea, neck pain, numbness, sore throat, vomiting or weakness. The symptoms are aggravated by exertion and standing. She has tried ice, lying down, rest, position changes, NSAIDs, relaxation and acetaminophen (elevation) for the symptoms. The treatment provided mild relief.   Hypertension  This is a chronic problem. The current episode started more than 1 year ago. The problem has been gradually improving since onset. The problem is controlled. Associated symptoms include blurred vision. Pertinent negatives include no chest pain, neck pain, palpitations or shortness of breath. Risk factors for coronary artery disease include family history, obesity, post-menopausal state, stress and sedentary lifestyle. Past treatments include beta blockers, diuretics, lifestyle changes and calcium channel blockers. The current treatment provides moderate improvement. Compliance problems include diet and exercise.        Past Surgical History:   Procedure Laterality Date     SECTION  1990    CHOLECYSTECTOMY      HYSTERECTOMY       INSERTION OF URETERAL CATHETER N/A 10/16/2019    Procedure: INSERTION, CATHETER, URETER;  Surgeon: Willard Ruffin MD;  Location: [facility] OR;  Service: Urology;  Laterality: N/A;    LAPAROSCOPIC SIGMOIDECTOMY N/A 10/16/2019    Procedure: COLECTOMY, SIGMOID, LAPAROSCOPIC;  Surgeon: Doug Gifford MD;  Location: [facility] OR;  Service: General;  Laterality: N/A;    SHOULDER ARTHROSCOPY Bilateral     STOMACH SURGERY      Possible Nissen per pt- To treat acid reflux     TUBAL LIGATION  07/27/1990     Family History   Problem Relation Age of Onset    Coronary artery disease Mother     Hyperlipidemia Mother     Other Mother         Breast Nodules    Arthritis Mother     Hypertension Father     Atrial fibrillation Father     Neuropathy Father     Other Father         Bypass    Diabetes Father     Hypertension Sister     Cancer Sister         Breast Cacner    Neuropathy Sister     Breast cancer Sister     Hypertension Daughter     No Known Problems Son     Diabetes Maternal Grandmother     Hypertension Maternal Grandmother     No Known Problems Maternal Grandfather     Coronary artery disease Paternal Grandmother     Cancer Paternal Grandmother         Breast Cancer    Heart attack Paternal Grandmother     Breast cancer Paternal Grandmother     Dementia Paternal Grandfather     Heart attack Paternal Grandfather     No Known Problems Sister     Other Daughter         Benign Hypertension(Brain)    Breast cancer Maternal Aunt     Breast cancer Paternal Aunt       Social History     Socioeconomic History    Marital status:     Number of children: 3   Tobacco Use    Smoking status: Never Smoker    Smokeless tobacco: Never Used   Substance and Sexual Activity    Alcohol use: Yes     Alcohol/week: 3.0 standard drinks     Types: 3 Glasses of wine per week     Comment: 3 glasses of wine per week     Drug use: Never     Social Determinants of Health     Financial Resource Strain: Low  Risk     Difficulty of Paying Living Expenses: Not very hard   Food Insecurity: No Food Insecurity    Worried About Running Out of Food in the Last Year: Never true    Ran Out of Food in the Last Year: Never true   Transportation Needs: No Transportation Needs    Lack of Transportation (Medical): No    Lack of Transportation (Non-Medical): No   Physical Activity: Sufficiently Active    Days of Exercise per Week: 5 days    Minutes of Exercise per Session: 60 min   Stress: No Stress Concern Present    Feeling of Stress : Only a little   Social Connections: Unknown    Frequency of Communication with Friends and Family: More than three times a week    Frequency of Social Gatherings with Friends and Family: Twice a week    Active Member of Clubs or Organizations: Yes    Attends Club or Organization Meetings: More than 4 times per year    Marital Status:    Housing Stability: Low Risk     Unable to Pay for Housing in the Last Year: No    Number of Places Lived in the Last Year: 1    Unstable Housing in the Last Year: No     Current Outpatient Medications   Medication Sig Dispense Refill    aspirin (ECOTRIN) 81 MG EC tablet Take 81 mg by mouth once daily.      cetirizine 10 mg chewable tablet Take 10 mg by mouth once daily.      cholecalciferol, vitamin D3, 125 mcg (5,000 unit) Tab 5,000 Units once daily.       cyanocobalamin, vitamin B-12, 5,000 mcg TbDL Take 5,000 Units by mouth once daily.       hydroCHLOROthiazide (HYDRODIURIL) 12.5 MG Tab Take 1 tablet (12.5 mg total) by mouth daily as needed (leg swelling). 30 tablet 5    LUCENTIS 0.5 mg/0.05 mL Syrg injection       magnesium oxide 500 mg Cap Take 1 capsule by mouth once daily.      salmeteroL (SEREVENT) 50 mcg/dose diskus inhaler Inhale 1 puff into the lungs 2 (two) times daily. Controller 3 each 3    triamterene-hydrochlorothiazide 37.5-25 mg (MAXZIDE-25) 37.5-25 mg per tablet TAKE 1 TABLET DAILY 90 tablet 3    turmeric root extract  500 mg Cap Take 500 mg by mouth once daily.       VASCEPA 1 gram Cap TAKE 2 CAPSULES (2 GRAMS) TWICE A  capsule 3    albuterol (PROVENTIL/VENTOLIN HFA) 90 mcg/actuation inhaler Inhale 1-2 puffs into the lungs every 4 to 6 hours as needed for Wheezing or Shortness of Breath. Rescue 18 g 3    amLODIPine (NORVASC) 2.5 MG tablet Take 1 tablet (2.5 mg total) by mouth once daily. 90 tablet 0    nebivoloL (BYSTOLIC) 5 MG Tab Take 1 tablet (5 mg total) by mouth once daily. 90 tablet 0     No current facility-administered medications for this visit.     Review of patient's allergies indicates:   Allergen Reactions    Adhesive Hives    Lisinopril Other (See Comments)     Chest pain    Meloxicam     Meperidine     Oxycodone-acetaminophen       Past Medical History:   Diagnosis Date    Anesthesia 2009    Pt reports severe drop in BP when anesthesia given.    Asthma     Diverticulitis     GERD (gastroesophageal reflux disease)     Hypertension     Neuropathy     Stroke     RETINACULAR      Past Surgical History:   Procedure Laterality Date     SECTION  1990    CHOLECYSTECTOMY      HYSTERECTOMY      INSERTION OF URETERAL CATHETER N/A 10/16/2019    Procedure: INSERTION, CATHETER, URETER;  Surgeon: Willard Ruffin MD;  Location: [facility] OR;  Service: Urology;  Laterality: N/A;    LAPAROSCOPIC SIGMOIDECTOMY N/A 10/16/2019    Procedure: COLECTOMY, SIGMOID, LAPAROSCOPIC;  Surgeon: Doug Gifford MD;  Location: [facility] OR;  Service: General;  Laterality: N/A;    SHOULDER ARTHROSCOPY Bilateral     STOMACH SURGERY      Possible Nissen per pt- To treat acid reflux     TUBAL LIGATION  1990       Review of Systems   Constitutional: Negative for activity change, appetite change, fatigue and unexpected weight change.   HENT: Negative for congestion, ear pain, hearing loss, postnasal drip, sinus pressure, sinus pain, sneezing and sore throat.    Eyes: Positive for blurred vision.  "Negative for photophobia and pain.   Respiratory: Negative for cough, chest tightness, shortness of breath and wheezing.    Cardiovascular: Negative for chest pain, palpitations and leg swelling.   Gastrointestinal: Negative for abdominal distention, abdominal pain, blood in stool, constipation, diarrhea, nausea and vomiting.   Endocrine: Negative for cold intolerance, heat intolerance, polydipsia and polyuria.   Genitourinary: Negative for difficulty urinating, dysuria, flank pain, frequency, hematuria, pelvic pain and urgency.   Musculoskeletal: Positive for arthralgias, joint swelling and myalgias. Negative for back pain and neck pain.   Skin: Negative for pallor.   Allergic/Immunologic: Negative for environmental allergies and food allergies.   Neurological: Negative for dizziness, weakness, light-headedness and numbness.   Hematological: Does not bruise/bleed easily.   Psychiatric/Behavioral: Negative for agitation, confusion, decreased concentration and sleep disturbance. The patient is not nervous/anxious.       OBJECTIVE:      Vitals:    08/22/22 1034   BP: 126/76   BP Location: Right arm   Patient Position: Sitting   BP Method: Medium (Manual)   Pulse: 62   SpO2: 97%   Weight: 79.8 kg (176 lb)   Height: 5' 4" (1.626 m)     Physical Exam  Vitals and nursing note reviewed.   Constitutional:       General: She is not in acute distress.     Appearance: Normal appearance. She is well-developed. She is obese.   HENT:      Head: Normocephalic and atraumatic.      Right Ear: Hearing normal.      Left Ear: Hearing normal.      Nose: Nose normal. No rhinorrhea.   Eyes:      General: Lids are normal.         Right eye: No discharge.         Left eye: No discharge.      Conjunctiva/sclera: Conjunctivae normal.      Right eye: Right conjunctiva is not injected.      Left eye: Left conjunctiva is not injected.      Pupils: Pupils are equal, round, and reactive to light. Pupils are equal.      Right eye: Pupil is round " and reactive.      Left eye: Pupil is round and reactive.   Neck:      Thyroid: No thyromegaly.      Vascular: No JVD.      Trachea: Trachea normal. No tracheal deviation.   Cardiovascular:      Rate and Rhythm: Normal rate and regular rhythm.      Pulses:           Radial pulses are 2+ on the right side and 2+ on the left side.        Dorsalis pedis pulses are 2+ on the right side and 2+ on the left side.        Posterior tibial pulses are 2+ on the right side and 2+ on the left side.      Heart sounds: Normal heart sounds. No murmur heard.    No friction rub. No gallop.   Pulmonary:      Effort: Pulmonary effort is normal. No respiratory distress.      Breath sounds: Normal breath sounds. No stridor. No decreased breath sounds, wheezing, rhonchi or rales.   Abdominal:      General: Bowel sounds are normal. There is no distension.      Palpations: Abdomen is soft. Abdomen is not rigid.      Tenderness: There is no abdominal tenderness. There is no guarding.   Musculoskeletal:         General: Normal range of motion.      Cervical back: Normal range of motion and neck supple.        Feet:    Lymphadenopathy:      Cervical: No cervical adenopathy.   Skin:     General: Skin is warm and dry.      Capillary Refill: Capillary refill takes less than 2 seconds.      Coloration: Skin is not pale.      Findings: No lesion or rash.   Neurological:      Mental Status: She is alert and oriented to person, place, and time.      GCS: GCS eye subscore is 4. GCS verbal subscore is 5. GCS motor subscore is 6.      Motor: Motor function is intact. No atrophy.      Coordination: Coordination is intact. Coordination normal.      Gait: Gait abnormal.   Psychiatric:         Attention and Perception: Attention normal. She is attentive.         Mood and Affect: Affect is blunt and flat.         Speech: Speech normal.         Behavior: Behavior normal.         Thought Content: Thought content normal.         Cognition and Memory: Cognition  and memory normal.         Judgment: Judgment normal.        Assessment:       1. Hypertension, unspecified type    2. Acute foot pain, left        Plan:       Hypertension, unspecified type  -     amLODIPine (NORVASC) 2.5 MG tablet; Take 1 tablet (2.5 mg total) by mouth once daily.  Dispense: 90 tablet; Refill: 0  -     nebivoloL (BYSTOLIC) 5 MG Tab; Take 1 tablet (5 mg total) by mouth once daily.  Dispense: 90 tablet; Refill: 0    Acute foot pain, left  -     Ambulatory referral/consult to Podiatry; Future; Expected date: 08/29/2022  -     X-Ray Foot Complete Left; Future; Expected date: 08/22/2022        Follow up if symptoms worsen or fail to improve.      8/22/2022 CHITO Shea, FNP-C

## 2022-08-23 ENCOUNTER — HOSPITAL ENCOUNTER (OUTPATIENT)
Dept: RADIOLOGY | Facility: CLINIC | Age: 57
Discharge: HOME | End: 2022-08-23
Attending: PODIATRIST
Payer: COMMERCIAL

## 2022-08-23 ENCOUNTER — OFFICE VISIT (OUTPATIENT)
Dept: PODIATRY | Facility: CLINIC | Age: 57
End: 2022-08-23
Payer: COMMERCIAL

## 2022-08-23 VITALS — WEIGHT: 176 LBS | HEIGHT: 64 IN | BODY MASS INDEX: 30.05 KG/M2 | OXYGEN SATURATION: 95 % | HEART RATE: 68 BPM

## 2022-08-23 DIAGNOSIS — M79.672 ACUTE FOOT PAIN, LEFT: ICD-10-CM

## 2022-08-23 DIAGNOSIS — S92.502A CLOSED FRACTURE OF PHALANX OF LEFT FOURTH TOE, INITIAL ENCOUNTER: ICD-10-CM

## 2022-08-23 DIAGNOSIS — S92.425A CLOSED NONDISPLACED FRACTURE OF DISTAL PHALANX OF LEFT GREAT TOE, INITIAL ENCOUNTER: Primary | ICD-10-CM

## 2022-08-23 PROCEDURE — 1160F RVW MEDS BY RX/DR IN RCRD: CPT | Mod: CPTII,S$GLB,, | Performed by: PODIATRIST

## 2022-08-23 PROCEDURE — 1160F PR REVIEW ALL MEDS BY PRESCRIBER/CLIN PHARMACIST DOCUMENTED: ICD-10-PCS | Mod: CPTII,S$GLB,, | Performed by: PODIATRIST

## 2022-08-23 PROCEDURE — 3008F PR BODY MASS INDEX (BMI) DOCUMENTED: ICD-10-PCS | Mod: CPTII,S$GLB,, | Performed by: PODIATRIST

## 2022-08-23 PROCEDURE — 99203 OFFICE O/P NEW LOW 30 MIN: CPT | Mod: S$GLB,,, | Performed by: PODIATRIST

## 2022-08-23 PROCEDURE — 3008F BODY MASS INDEX DOCD: CPT | Mod: CPTII,S$GLB,, | Performed by: PODIATRIST

## 2022-08-23 PROCEDURE — 99203 PR OFFICE/OUTPT VISIT, NEW, LEVL III, 30-44 MIN: ICD-10-PCS | Mod: S$GLB,,, | Performed by: PODIATRIST

## 2022-08-23 PROCEDURE — 73630 X-RAY EXAM OF FOOT: CPT | Mod: LT,S$GLB,, | Performed by: RADIOLOGY

## 2022-08-23 PROCEDURE — 73630 XR FOOT COMPLETE 3 VIEW LEFT: ICD-10-PCS | Mod: LT,S$GLB,, | Performed by: RADIOLOGY

## 2022-08-23 PROCEDURE — 1159F MED LIST DOCD IN RCRD: CPT | Mod: CPTII,S$GLB,, | Performed by: PODIATRIST

## 2022-08-23 PROCEDURE — 1159F PR MEDICATION LIST DOCUMENTED IN MEDICAL RECORD: ICD-10-PCS | Mod: CPTII,S$GLB,, | Performed by: PODIATRIST

## 2022-08-23 NOTE — PATIENT INSTRUCTIONS
Closed Toe Fracture  Your toe is broken (fractured). This causes local pain, swelling, and sometimes bruising. This injury usually takes about 4 to 6 weeks to heal, but can sometimes take longer. Toe injuries are often treated by taping the injured toe to the next one (buddy taping). This protects the injured toe and holds it in position.     If the toenail has been severely injured, it may fall off in 1 to 2 weeks. It takes up to 12 months for a new toenail to grow back.  Home care  Follow these guidelines when caring for yourself at home:  You may be given a cast shoe to wear to keep your toe from moving. If not, you can use a sandal or any shoe that doesnt put pressure on the injured toe until the swelling and pain go away. If using a sandal, be careful not to strike your foot against anything. Another injury could make the fracture worse. If you were given crutches, dont put full weight on the injured foot until you can do so without pain, or as directed by your healthcare provider.  Keep your foot elevated to reduce pain and swelling. When sleeping, put a pillow under the injured leg. When sitting, support the injured leg so it is above your waist. This is very important during the first 2 days (48 hours).  Put an ice pack on the injured area. Do this for 20 minutes every 1 to 2 hours the first day for pain relief. You can make an ice pack by wrapping a plastic bag of ice cubes in a thin towel. As the ice melts, be careful that any cloth or paper tape doesnt get wet. Continue using the ice pack 3 to 4 times a day for the next 2 days. Then use the ice pack as needed to ease pain and swelling.  If buddy tape was used and it becomes wet or dirty, change it. You may replace it with paper, plastic, or cloth tape. Cloth tape and paper tapes must be kept dry.  You may use acetaminophen or ibuprofen to control pain, unless another pain medicine was prescribed. If you have chronic liver or kidney disease, talk with  your healthcare provider before using these medicines. Also talk with your provider if youve had a stomach ulcer or gastrointestinal bleeding.  You may return to sports or physical education activities after 4 weeks when you can run without pain, or as directed by your healthcare provider.  Follow-up care  Follow up with your healthcare provider in 1 week, or as advised. This is to make sure the bone is healing the way it should.  X-rays may be taken. You will be told of any new findings that may affect your care.  When to seek medical advice  Call your healthcare provider right away if any of these occur:  Pain or swelling gets worse  The cast/splint cracks  The cast and padding get wet and stays wet more than 24 hours  Bad odor from the cast/splint or wound fluid stains the cast  Tightness or pressure under the cast/splint gets worse  Toe becomes cold, blue, numb, or tingly  You cant move the toe  Signs of infection: fever, redness, warmth, swelling, or drainage from the wound or cast  Fever of 100.4ºF (38ºC) or higher, or as directed by your healthcare provider  Date Last Reviewed: 2/1/2017  © 4591-3872 [employer]. [address] [place] [address]. All rights reserved. This information is not intended as a substitute for professional medical care. Always follow your healthcare professional's instructions.

## 2022-08-23 NOTE — PROGRESS NOTES
"  [address] [facility] [address]  [place] [address]  Phone: [phone]   [phone]    Patient's PCP:CHITO Shea,KDP-C  Referring Provider: Yoni Morelos    Subjective:      Chief Complaint:: Foot Pain (Left ) and Toe Pain (Left )    HPI  Daniela Lawson is a 57 y.o. female who presents today with a complaint of left foot great toe and 4th toe pain lasting since 2022 . Onset of symptoms was coming down stairs and missed the last step and kicked the floor reports trauma.  Current symptoms include pain, swelling and discoloration.  Aggravating factors are stepping on it or putting any pressure. Symptoms have gotten better. Treatment to date have included went to see the PCP 2022 who sent me to podiatry.        Vitals:    22 0958   Pulse: 68   SpO2: 95%   Weight: 79.8 kg (176 lb)   Height: 5' 4" (1.626 m)   PainSc:   2      Shoe Size: 8-8.5    Past Surgical History:   Procedure Laterality Date     SECTION  1990    CHOLECYSTECTOMY      HYSTERECTOMY      INSERTION OF URETERAL CATHETER N/A 10/16/2019    Procedure: INSERTION, CATHETER, URETER;  Surgeon: Willard Ruffin MD;  Location: [facility] OR;  Service: Urology;  Laterality: N/A;    LAPAROSCOPIC SIGMOIDECTOMY N/A 10/16/2019    Procedure: COLECTOMY, SIGMOID, LAPAROSCOPIC;  Surgeon: Doug Gifford MD;  Location: [facility] OR;  Service: General;  Laterality: N/A;    SHOULDER ARTHROSCOPY Bilateral     STOMACH SURGERY      Possible Nissen per pt- To treat acid reflux     TUBAL LIGATION  1990     Past Medical History:   Diagnosis Date    Anesthesia 2009    Pt reports severe drop in BP when anesthesia given.    Asthma     Diverticulitis     GERD (gastroesophageal reflux disease)     Hypertension     Neuropathy 2007    Stroke     RETINACULAR      Family History   Problem Relation Age of Onset    Coronary artery disease Mother     Hyperlipidemia Mother     Other Mother         Breast Nodules    " Arthritis Mother     Hypertension Father     Atrial fibrillation Father     Neuropathy Father     Other Father         Bypass    Diabetes Father     Hypertension Sister     Cancer Sister         Breast Cacner    Neuropathy Sister     Breast cancer Sister     Hypertension Daughter     No Known Problems Son     Diabetes Maternal Grandmother     Hypertension Maternal Grandmother     No Known Problems Maternal Grandfather     Coronary artery disease Paternal Grandmother     Cancer Paternal Grandmother         Breast Cancer    Heart attack Paternal Grandmother     Breast cancer Paternal Grandmother     Dementia Paternal Grandfather     Heart attack Paternal Grandfather     No Known Problems Sister     Other Daughter         Benign Hypertension(Brain)    Breast cancer Maternal Aunt     Breast cancer Paternal Aunt         Social History:   Marital Status:   Alcohol History:  reports current alcohol use of about 3.0 standard drinks of alcohol per week.  Tobacco History:  reports that she has never smoked. She has never used smokeless tobacco.  Drug History:  reports no history of drug use.    Review of patient's allergies indicates:   Allergen Reactions    Adhesive Hives    Lisinopril Other (See Comments)     Chest pain    Meloxicam     Meperidine     Oxycodone-acetaminophen        Current Outpatient Medications   Medication Sig Dispense Refill    amLODIPine (NORVASC) 2.5 MG tablet Take 1 tablet (2.5 mg total) by mouth once daily. 90 tablet 0    aspirin (ECOTRIN) 81 MG EC tablet Take 81 mg by mouth once daily.      cetirizine 10 mg chewable tablet Take 10 mg by mouth once daily.      cholecalciferol, vitamin D3, 125 mcg (5,000 unit) Tab 5,000 Units once daily.       cyanocobalamin, vitamin B-12, 5,000 mcg TbDL Take 5,000 Units by mouth once daily.       hydroCHLOROthiazide (HYDRODIURIL) 12.5 MG Tab Take 1 tablet (12.5 mg total) by mouth daily as needed (leg swelling). 30 tablet 5     LUCENTIS 0.5 mg/0.05 mL Syrg injection       magnesium oxide 500 mg Cap Take 1 capsule by mouth once daily.      nebivoloL (BYSTOLIC) 5 MG Tab Take 1 tablet (5 mg total) by mouth once daily. 90 tablet 0    salmeteroL (SEREVENT) 50 mcg/dose diskus inhaler Inhale 1 puff into the lungs 2 (two) times daily. Controller 3 each 3    triamterene-hydrochlorothiazide 37.5-25 mg (MAXZIDE-25) 37.5-25 mg per tablet TAKE 1 TABLET DAILY 90 tablet 0    turmeric root extract 500 mg Cap Take 500 mg by mouth once daily.       VASCEPA 1 gram Cap TAKE 2 CAPSULES (2 GRAMS) TWICE A  capsule 3    albuterol (PROVENTIL/VENTOLIN HFA) 90 mcg/actuation inhaler Inhale 1-2 puffs into the lungs every 4 to 6 hours as needed for Wheezing or Shortness of Breath. Rescue 18 g 3     No current facility-administered medications for this visit.       Review of Systems   Constitutional: Negative for chills, fatigue, fever and unexpected weight change.   HENT: Negative for hearing loss and trouble swallowing.    Eyes: Negative for photophobia and visual disturbance.   Respiratory: Negative for cough, shortness of breath and wheezing.    Cardiovascular: Negative for chest pain, palpitations and leg swelling.   Gastrointestinal: Negative for abdominal pain and nausea.   Genitourinary: Negative for dysuria and frequency.   Musculoskeletal: Positive for arthralgias and back pain. Negative for gait problem, joint swelling and myalgias.   Skin: Negative for rash and wound.   Neurological: Positive for numbness. Negative for seizures, weakness and headaches.   Hematological: Does not bruise/bleed easily.         Objective:        Physical Exam:   Foot Exam    General  General Appearance: appears stated age and healthy   Orientation: alert and oriented to person, place, and time   Affect: appropriate   Gait: antalgic       Left Foot/Ankle      Inspection and Palpation  Ecchymosis: first toe and fourth toe (Dorsal forefoot)  Tenderness: lesser  metatarsophalangeal joints (1st and 4th toe)  Swelling: dorsum and lesser metatarsophalangeal joints (1st and 4th toe)  Arch: normal  Skin Exam: skin intact;   Neurovascular  Dorsalis pedis: 2+  Capillary refill: 2+  Saphenous nerve sensation: normal  Tibial nerve sensation: normal  Superficial peroneal nerve sensation: normal  Deep peroneal nerve sensation: normal  Sural nerve sensation: normal          Physical Exam  Cardiovascular:      Pulses:           Dorsalis pedis pulses are 2+ on the left side.               Left Ankle/Foot Exam     Swelling   The patient is swollen on the lesser metatarsophalangeal joints.    Tenderness   The patient is tender to palpation of the lesser metatarsophalangeal joints.      Vascular Exam       Left Pulses  Dorsalis Pedis:      2+               Imaging:   AP, lateral, lateral oblique weight-bearing x-rays left foot:  Fracture of distal head of proximal phalanx 4th toe left with minimal to no displacement.  Avulsion fracture dorsal base of distal phalanx 1st toe with no displacement.         Assessment:       1. Closed nondisplaced fracture of distal phalanx of left great toe, initial encounter    2. Closed fracture of phalanx of left fourth toe, initial encounter    3. Acute foot pain, left      Plan:   Closed nondisplaced fracture of distal phalanx of left great toe, initial encounter    Closed fracture of phalanx of left fourth toe, initial encounter    Acute foot pain, left  -     Ambulatory referral/consult to Podiatry  -     X-Ray Foot Complete Left    Evaluated patient discussed with the patient her  clinical findings and radiological findings of fracture 4th toe left foot and distal phalanx dorsal base great toe left foot.  I discussed surgical shoe and fracture shoe dima splinting icing anti-inflammatories it will take 4-6 weeks to heal.  I explained to him I can not see any soft tissue damage if there is any tendon or ligament damage done to the foot.  I  demonstrated how to dima splint the toes were placing her surgical/fracture shoe no barefoot.  She return to see me in 4 weeks for x-rays and possibility of transitioning into running shoe.      Procedures          Counseling:     I provided patient education verbally regarding:   Patient diagnosis, treatment options, as well as alternatives, risks, and benefits.   I discussed with the pt. the type of fracture and the treatment and time required for healing of the the specific type fracture.  This note was created using Dragon voice recognition software that occasionally misinterpreted phrases or words.

## 2022-09-20 ENCOUNTER — HOSPITAL ENCOUNTER (OUTPATIENT)
Dept: RADIOLOGY | Facility: CLINIC | Age: 57
Discharge: HOME | End: 2022-09-20
Attending: PODIATRIST
Payer: COMMERCIAL

## 2022-09-20 ENCOUNTER — OFFICE VISIT (OUTPATIENT)
Dept: PODIATRY | Facility: CLINIC | Age: 57
End: 2022-09-20
Payer: COMMERCIAL

## 2022-09-20 VITALS — OXYGEN SATURATION: 96 % | HEIGHT: 64 IN | HEART RATE: 63 BPM | WEIGHT: 176 LBS | BODY MASS INDEX: 30.05 KG/M2

## 2022-09-20 DIAGNOSIS — S92.425D CLOSED NONDISPLACED FRACTURE OF DISTAL PHALANX OF LEFT GREAT TOE WITH ROUTINE HEALING, SUBSEQUENT ENCOUNTER: Primary | ICD-10-CM

## 2022-09-20 DIAGNOSIS — S92.502D CLOSED FRACTURE OF PHALANX OF LEFT FOURTH TOE WITH ROUTINE HEALING, SUBSEQUENT ENCOUNTER: ICD-10-CM

## 2022-09-20 PROCEDURE — 1160F PR REVIEW ALL MEDS BY PRESCRIBER/CLIN PHARMACIST DOCUMENTED: ICD-10-PCS | Mod: CPTII,S$GLB,, | Performed by: PODIATRIST

## 2022-09-20 PROCEDURE — 99213 OFFICE O/P EST LOW 20 MIN: CPT | Mod: S$GLB,,, | Performed by: PODIATRIST

## 2022-09-20 PROCEDURE — 3008F PR BODY MASS INDEX (BMI) DOCUMENTED: ICD-10-PCS | Mod: CPTII,S$GLB,, | Performed by: PODIATRIST

## 2022-09-20 PROCEDURE — 99213 PR OFFICE/OUTPT VISIT, EST, LEVL III, 20-29 MIN: ICD-10-PCS | Mod: S$GLB,,, | Performed by: PODIATRIST

## 2022-09-20 PROCEDURE — 73630 XR FOOT COMPLETE 3 VIEW LEFT: ICD-10-PCS | Mod: LT,S$GLB,, | Performed by: RADIOLOGY

## 2022-09-20 PROCEDURE — 1159F PR MEDICATION LIST DOCUMENTED IN MEDICAL RECORD: ICD-10-PCS | Mod: CPTII,S$GLB,, | Performed by: PODIATRIST

## 2022-09-20 PROCEDURE — 1160F RVW MEDS BY RX/DR IN RCRD: CPT | Mod: CPTII,S$GLB,, | Performed by: PODIATRIST

## 2022-09-20 PROCEDURE — 3008F BODY MASS INDEX DOCD: CPT | Mod: CPTII,S$GLB,, | Performed by: PODIATRIST

## 2022-09-20 PROCEDURE — 1159F MED LIST DOCD IN RCRD: CPT | Mod: CPTII,S$GLB,, | Performed by: PODIATRIST

## 2022-09-20 PROCEDURE — 73630 X-RAY EXAM OF FOOT: CPT | Mod: LT,S$GLB,, | Performed by: RADIOLOGY

## 2022-09-20 NOTE — PROGRESS NOTES
"  [address] [facility] [address]  [place] [address]  Phone: [phone]   [phone]    Patient's PCP:CHITO Shea,FNP-C  Referring Provider: Yoni Morelos    Subjective:      Chief Complaint:: Follow-up (closed nondisplaced fracture of distal Phalanx of left great toe, Closed fracture of phalanx of left fourth toe.)    HPI  Daniela Lawson is a 57 y.o. female who presents today with a follow up complaint of closed nondisplaced fracture of distal Phalanx of left great toe, Closed fracture of phalanx of left fourth toe. Patient is hoping to transition into a tennis shoe.  Vitals:    22 0904   Pulse: 63   SpO2: 96%   Weight: 79.8 kg (176 lb)   Height: 5' 4" (1.626 m)   PainSc: 0-No pain      Shoe Size: 8-8.5    Past Surgical History:   Procedure Laterality Date     SECTION  1990    CHOLECYSTECTOMY      HYSTERECTOMY      INSERTION OF URETERAL CATHETER N/A 10/16/2019    Procedure: INSERTION, CATHETER, URETER;  Surgeon: Willard Ruffin MD;  Location: [facility] OR;  Service: Urology;  Laterality: N/A;    LAPAROSCOPIC SIGMOIDECTOMY N/A 10/16/2019    Procedure: COLECTOMY, SIGMOID, LAPAROSCOPIC;  Surgeon: Doug Gifford MD;  Location: [facility] OR;  Service: General;  Laterality: N/A;    SHOULDER ARTHROSCOPY Bilateral     STOMACH SURGERY      Possible Nissen per pt- To treat acid reflux     TUBAL LIGATION  1990     Past Medical History:   Diagnosis Date    Anesthesia 2009    Pt reports severe drop in BP when anesthesia given.    Asthma     Diverticulitis     GERD (gastroesophageal reflux disease)     Hypertension     Neuropathy 2007    Stroke     RETINACULAR      Family History   Problem Relation Age of Onset    Coronary artery disease Mother     Hyperlipidemia Mother     Other Mother         Breast Nodules    Arthritis Mother     Hypertension Father     Atrial fibrillation Father     Neuropathy Father     Other Father         Bypass    Diabetes Father     Hypertension Sister "     Cancer Sister         Breast Cacner    Neuropathy Sister     Breast cancer Sister     Hypertension Daughter     No Known Problems Son     Diabetes Maternal Grandmother     Hypertension Maternal Grandmother     No Known Problems Maternal Grandfather     Coronary artery disease Paternal Grandmother     Cancer Paternal Grandmother         Breast Cancer    Heart attack Paternal Grandmother     Breast cancer Paternal Grandmother     Dementia Paternal Grandfather     Heart attack Paternal Grandfather     No Known Problems Sister     Other Daughter         Benign Hypertension(Brain)    Breast cancer Maternal Aunt     Breast cancer Paternal Aunt         Social History:   Marital Status:   Alcohol History:  reports current alcohol use of about 3.0 standard drinks per week.  Tobacco History:  reports that she has never smoked. She has never used smokeless tobacco.  Drug History:  reports no history of drug use.    Review of patient's allergies indicates:   Allergen Reactions    Adhesive Hives    Lisinopril Other (See Comments)     Chest pain    Meloxicam     Meperidine     Oxycodone-acetaminophen        Current Outpatient Medications   Medication Sig Dispense Refill    amLODIPine (NORVASC) 2.5 MG tablet Take 1 tablet (2.5 mg total) by mouth once daily. 90 tablet 0    aspirin (ECOTRIN) 81 MG EC tablet Take 81 mg by mouth once daily.      cetirizine 10 mg chewable tablet Take 10 mg by mouth once daily.      cholecalciferol, vitamin D3, 125 mcg (5,000 unit) Tab 5,000 Units once daily.       cyanocobalamin, vitamin B-12, 5,000 mcg TbDL Take 5,000 Units by mouth once daily.       hydroCHLOROthiazide (HYDRODIURIL) 12.5 MG Tab Take 1 tablet (12.5 mg total) by mouth daily as needed (leg swelling). 30 tablet 5    LUCENTIS 0.5 mg/0.05 mL Syrg injection       magnesium oxide 500 mg Cap Take 1 capsule by mouth once daily.      nebivoloL (BYSTOLIC) 5 MG Tab Take 1 tablet (5 mg total) by mouth once daily. 90 tablet 0     salmeteroL (SEREVENT) 50 mcg/dose diskus inhaler Inhale 1 puff into the lungs 2 (two) times daily. Controller 3 each 3    triamterene-hydrochlorothiazide 37.5-25 mg (MAXZIDE-25) 37.5-25 mg per tablet TAKE 1 TABLET DAILY 90 tablet 0    turmeric root extract 500 mg Cap Take 500 mg by mouth once daily.       VASCEPA 1 gram Cap TAKE 2 CAPSULES (2 GRAMS) TWICE A  capsule 3    albuterol (PROVENTIL/VENTOLIN HFA) 90 mcg/actuation inhaler Inhale 1-2 puffs into the lungs every 4 to 6 hours as needed for Wheezing or Shortness of Breath. Rescue 18 g 3     No current facility-administered medications for this visit.       Review of Systems   Constitutional:  Negative for chills, fatigue, fever and unexpected weight change.   HENT:  Negative for hearing loss and trouble swallowing.    Eyes:  Negative for photophobia and visual disturbance.   Respiratory:  Negative for cough, shortness of breath and wheezing.    Cardiovascular:  Negative for chest pain, palpitations and leg swelling.   Gastrointestinal:  Negative for abdominal pain and nausea.   Genitourinary:  Negative for dysuria and frequency.   Musculoskeletal:  Positive for arthralgias and back pain. Negative for gait problem, joint swelling and myalgias.   Skin:  Negative for rash and wound.   Neurological:  Positive for numbness. Negative for seizures, weakness and headaches.   Hematological:  Does not bruise/bleed easily.       Objective:        Physical Exam:   Foot Exam    General  General Appearance: appears stated age and healthy   Orientation: alert and oriented to person, place, and time   Affect: appropriate   Gait: unimpaired       Left Foot/Ankle      Inspection and Palpation  Ecchymosis: none  Tenderness: great toe interphalangeal joint (Minimal pain 1st toe and 4th)  Swelling: (Minimal swelling 1st toe and 4th)  Skin Exam: skin intact;   Neurovascular  Dorsalis pedis: 2+  Posterior tibial: 2+  Capillary refill: 2+  Saphenous nerve sensation:  normal  Tibial nerve sensation: normal  Superficial peroneal nerve sensation: normal  Deep peroneal nerve sensation: normal  Sural nerve sensation: normal      Physical Exam  Cardiovascular:      Pulses:           Dorsalis pedis pulses are 2+ on the left side.        Posterior tibial pulses are 2+ on the left side.             Left Ankle/Foot Exam     Tenderness   The patient is tender to palpation of the great toe interphalangeal joint.      Vascular Exam       Left Pulses  Dorsalis Pedis:      2+  Posterior Tibial:      2+         Imaging:   AP, lateral, lateral oblique weight-bearing x-rays left foot:  Normal healing fracture head of proximal phalanx 4th toe and base of distal phalanx 1st toe with no malalignment.  Not completely healed.         Assessment:       1. Closed nondisplaced fracture of distal phalanx of left great toe, initial encounter    2. Closed fracture of phalanx of left fourth toe, initial encounter      Plan:   Closed nondisplaced fracture of distal phalanx of left great toe, initial encounter    Closed fracture of phalanx of left fourth toe, initial encounter    Patient can transition running shoe no barefoot no impact exercising for 4 weeks.  If she is doing well in 4 weeks she will gradually increase activity level as tolerated.  She will not return to see me unless there is problems after 4 weeks.  Explained her that she is healing normally.      Procedures          Counseling:     I provided patient education verbally regarding:   Patient diagnosis, treatment options, as well as alternatives, risks, and benefits.     I discussed with the pt. the type of fracture and the treatment and time required for healing of the the specific type fracture.   This note was created using Dragon voice recognition software that occasionally misinterpreted phrases or words.

## 2022-09-20 NOTE — PATIENT INSTRUCTIONS
Closed Toe Fracture  Your toe is broken (fractured). This causes local pain, swelling, and sometimes bruising. This injury usually takes about 4 to 6 weeks to heal, but can sometimes take longer. Toe injuries are often treated by taping the injured toe to the next one (buddy taping). This protects the injured toe and holds it in position.     If the toenail has been severely injured, it may fall off in 1 to 2 weeks. It takes up to 12 months for a new toenail to grow back.  Home care  Follow these guidelines when caring for yourself at home:  You may be given a cast shoe to wear to keep your toe from moving. If not, you can use a sandal or any shoe that doesnt put pressure on the injured toe until the swelling and pain go away. If using a sandal, be careful not to strike your foot against anything. Another injury could make the fracture worse. If you were given crutches, dont put full weight on the injured foot until you can do so without pain, or as directed by your healthcare provider.  Keep your foot elevated to reduce pain and swelling. When sleeping, put a pillow under the injured leg. When sitting, support the injured leg so it is above your waist. This is very important during the first 2 days (48 hours).  Put an ice pack on the injured area. Do this for 20 minutes every 1 to 2 hours the first day for pain relief. You can make an ice pack by wrapping a plastic bag of ice cubes in a thin towel. As the ice melts, be careful that any cloth or paper tape doesnt get wet. Continue using the ice pack 3 to 4 times a day for the next 2 days. Then use the ice pack as needed to ease pain and swelling.  If buddy tape was used and it becomes wet or dirty, change it. You may replace it with paper, plastic, or cloth tape. Cloth tape and paper tapes must be kept dry.  You may use acetaminophen or ibuprofen to control pain, unless another pain medicine was prescribed. If you have chronic liver or kidney disease, talk with  your healthcare provider before using these medicines. Also talk with your provider if youve had a stomach ulcer or gastrointestinal bleeding.  You may return to sports or physical education activities after 4 weeks when you can run without pain, or as directed by your healthcare provider.  Follow-up care  Follow up with your healthcare provider in 1 week, or as advised. This is to make sure the bone is healing the way it should.  X-rays may be taken. You will be told of any new findings that may affect your care.  When to seek medical advice  Call your healthcare provider right away if any of these occur:  Pain or swelling gets worse  The cast/splint cracks  The cast and padding get wet and stays wet more than 24 hours  Bad odor from the cast/splint or wound fluid stains the cast  Tightness or pressure under the cast/splint gets worse  Toe becomes cold, blue, numb, or tingly  You cant move the toe  Signs of infection: fever, redness, warmth, swelling, or drainage from the wound or cast  Fever of 100.4ºF (38ºC) or higher, or as directed by your healthcare provider  Date Last Reviewed: 2/1/2017  © 7544-2423 [employer]. [address] [place] [address]. All rights reserved. This information is not intended as a substitute for professional medical care. Always follow your healthcare professional's instructions.

## 2022-10-18 DIAGNOSIS — Z12.31 ENCOUNTER FOR SCREENING MAMMOGRAM FOR MALIGNANT NEOPLASM OF BREAST: Primary | ICD-10-CM

## 2022-10-31 ENCOUNTER — HOSPITAL ENCOUNTER (OUTPATIENT)
Dept: RADIOLOGY | Facility: HOSPITAL | Age: 57
Discharge: HOME | End: 2022-10-31
Attending: NURSE PRACTITIONER
Payer: COMMERCIAL

## 2022-10-31 DIAGNOSIS — Z12.31 ENCOUNTER FOR SCREENING MAMMOGRAM FOR MALIGNANT NEOPLASM OF BREAST: ICD-10-CM

## 2022-10-31 PROCEDURE — 77067 SCR MAMMO BI INCL CAD: CPT | Mod: TC,PO

## 2022-10-31 PROCEDURE — 77063 BREAST TOMOSYNTHESIS BI: CPT | Mod: TC,PO

## 2022-11-09 ENCOUNTER — OFFICE VISIT (OUTPATIENT)
Dept: FAMILY MEDICINE | Facility: CLINIC | Age: 57
End: 2022-11-09
Payer: COMMERCIAL

## 2022-11-09 VITALS
HEART RATE: 67 BPM | OXYGEN SATURATION: 98 % | HEIGHT: 64 IN | BODY MASS INDEX: 30.92 KG/M2 | WEIGHT: 181.13 LBS | DIASTOLIC BLOOD PRESSURE: 84 MMHG | SYSTOLIC BLOOD PRESSURE: 122 MMHG

## 2022-11-09 DIAGNOSIS — R53.82 CHRONIC FATIGUE: ICD-10-CM

## 2022-11-09 DIAGNOSIS — Z00.00 ROUTINE HEALTH MAINTENANCE: Primary | ICD-10-CM

## 2022-11-09 DIAGNOSIS — I10 HYPERTENSION, UNSPECIFIED TYPE: ICD-10-CM

## 2022-11-09 DIAGNOSIS — R06.02 SOB (SHORTNESS OF BREATH): Primary | ICD-10-CM

## 2022-11-09 DIAGNOSIS — I49.8 PERIODIC HEART FLUTTER: ICD-10-CM

## 2022-11-09 DIAGNOSIS — E78.1 PURE HYPERTRIGLYCERIDEMIA: ICD-10-CM

## 2022-11-09 PROCEDURE — 99396 PR PREVENTIVE VISIT,EST,40-64: ICD-10-PCS | Mod: S$GLB,,, | Performed by: NURSE PRACTITIONER

## 2022-11-09 PROCEDURE — 1160F PR REVIEW ALL MEDS BY PRESCRIBER/CLIN PHARMACIST DOCUMENTED: ICD-10-PCS | Mod: CPTII,S$GLB,, | Performed by: NURSE PRACTITIONER

## 2022-11-09 PROCEDURE — 1159F MED LIST DOCD IN RCRD: CPT | Mod: CPTII,S$GLB,, | Performed by: NURSE PRACTITIONER

## 2022-11-09 PROCEDURE — 3008F BODY MASS INDEX DOCD: CPT | Mod: CPTII,S$GLB,, | Performed by: NURSE PRACTITIONER

## 2022-11-09 PROCEDURE — 1160F RVW MEDS BY RX/DR IN RCRD: CPT | Mod: CPTII,S$GLB,, | Performed by: NURSE PRACTITIONER

## 2022-11-09 PROCEDURE — 3079F PR MOST RECENT DIASTOLIC BLOOD PRESSURE 80-89 MM HG: ICD-10-PCS | Mod: CPTII,S$GLB,, | Performed by: NURSE PRACTITIONER

## 2022-11-09 PROCEDURE — 3079F DIAST BP 80-89 MM HG: CPT | Mod: CPTII,S$GLB,, | Performed by: NURSE PRACTITIONER

## 2022-11-09 PROCEDURE — 3074F SYST BP LT 130 MM HG: CPT | Mod: CPTII,S$GLB,, | Performed by: NURSE PRACTITIONER

## 2022-11-09 PROCEDURE — 1159F PR MEDICATION LIST DOCUMENTED IN MEDICAL RECORD: ICD-10-PCS | Mod: CPTII,S$GLB,, | Performed by: NURSE PRACTITIONER

## 2022-11-09 PROCEDURE — 3074F PR MOST RECENT SYSTOLIC BLOOD PRESSURE < 130 MM HG: ICD-10-PCS | Mod: CPTII,S$GLB,, | Performed by: NURSE PRACTITIONER

## 2022-11-09 PROCEDURE — 3008F PR BODY MASS INDEX (BMI) DOCUMENTED: ICD-10-PCS | Mod: CPTII,S$GLB,, | Performed by: NURSE PRACTITIONER

## 2022-11-09 PROCEDURE — 99396 PREV VISIT EST AGE 40-64: CPT | Mod: S$GLB,,, | Performed by: NURSE PRACTITIONER

## 2022-11-09 RX ORDER — TRIAMTERENE/HYDROCHLOROTHIAZID 37.5-25 MG
1 TABLET ORAL DAILY
Qty: 90 TABLET | Refills: 1 | Status: SHIPPED | OUTPATIENT
Start: 2022-11-09 | End: 2023-05-11

## 2022-11-09 RX ORDER — AMLODIPINE BESYLATE 2.5 MG/1
2.5 TABLET ORAL DAILY
Qty: 90 TABLET | Refills: 1 | Status: SHIPPED | OUTPATIENT
Start: 2022-11-09 | End: 2023-07-06

## 2022-11-09 RX ORDER — ICOSAPENT ETHYL 1000 MG/1
CAPSULE ORAL
Qty: 360 CAPSULE | Refills: 1 | Status: SHIPPED | OUTPATIENT
Start: 2022-11-09 | End: 2023-08-14 | Stop reason: SDUPTHER

## 2022-11-09 RX ORDER — NEBIVOLOL 5 MG/1
5 TABLET ORAL DAILY
Qty: 90 TABLET | Refills: 1 | Status: SHIPPED | OUTPATIENT
Start: 2022-11-09 | End: 2023-07-06

## 2022-11-09 NOTE — PROGRESS NOTES
SUBJECTIVE:      Patient ID: Daniela Lawson is a 57 y.o. female.    Chief Complaint: Annual Exam (Annual/Well Exam)    Presents for well exam & lab orders    Discussed outstanding health maintenance      Past Surgical History:   Procedure Laterality Date     SECTION  1990    CHOLECYSTECTOMY      HYSTERECTOMY      INSERTION OF URETERAL CATHETER N/A 10/16/2019    Procedure: INSERTION, CATHETER, URETER;  Surgeon: Willard Ruffin MD;  Location: [facility] OR;  Service: Urology;  Laterality: N/A;    LAPAROSCOPIC SIGMOIDECTOMY N/A 10/16/2019    Procedure: COLECTOMY, SIGMOID, LAPAROSCOPIC;  Surgeon: Doug Gifford MD;  Location: [facility] OR;  Service: General;  Laterality: N/A;    SHOULDER ARTHROSCOPY Bilateral     STOMACH SURGERY      Possible Nissen per pt- To treat acid reflux     TUBAL LIGATION  1990     Family History   Problem Relation Age of Onset    Coronary artery disease Mother     Hyperlipidemia Mother     Other Mother         Breast Nodules    Arthritis Mother     Hypertension Father     Atrial fibrillation Father     Neuropathy Father     Other Father         Bypass    Diabetes Father     Hypertension Sister     Cancer Sister         Breast Cacner    Neuropathy Sister     Breast cancer Sister     Hypertension Daughter     No Known Problems Son     Diabetes Maternal Grandmother     Hypertension Maternal Grandmother     No Known Problems Maternal Grandfather     Coronary artery disease Paternal Grandmother     Cancer Paternal Grandmother         Breast Cancer    Heart attack Paternal Grandmother     Breast cancer Paternal Grandmother     Dementia Paternal Grandfather     Heart attack Paternal Grandfather     No Known Problems Sister     Other Daughter         Benign Hypertension(Brain)    Breast cancer Maternal Aunt     Breast cancer Paternal Aunt       Social History     Socioeconomic History    Marital status:     Number of children: 3   Tobacco Use    Smoking status: Never     Smokeless tobacco: Never   Substance and Sexual Activity    Alcohol use: Yes     Alcohol/week: 3.0 standard drinks     Types: 3 Glasses of wine per week     Comment: 3 glasses of wine per week     Drug use: Never     Social Determinants of Health     Financial Resource Strain: Low Risk     Difficulty of Paying Living Expenses: Not very hard   Food Insecurity: No Food Insecurity    Worried About Running Out of Food in the Last Year: Never true    Ran Out of Food in the Last Year: Never true   Transportation Needs: No Transportation Needs    Lack of Transportation (Medical): No    Lack of Transportation (Non-Medical): No   Physical Activity: Sufficiently Active    Days of Exercise per Week: 3 days    Minutes of Exercise per Session: 50 min   Stress: Stress Concern Present    Feeling of Stress : To some extent   Social Connections: Unknown    Frequency of Communication with Friends and Family: More than three times a week    Frequency of Social Gatherings with Friends and Family: Three times a week    Active Member of Clubs or Organizations: Yes    Attends Club or Organization Meetings: More than 4 times per year    Marital Status:    Housing Stability: Low Risk     Unable to Pay for Housing in the Last Year: No    Number of Places Lived in the Last Year: 1    Unstable Housing in the Last Year: No     Current Outpatient Medications   Medication Sig Dispense Refill    albuterol (PROVENTIL/VENTOLIN HFA) 90 mcg/actuation inhaler Inhale 1-2 puffs into the lungs every 4 to 6 hours as needed for Wheezing or Shortness of Breath. Rescue 18 g 3    aspirin (ECOTRIN) 81 MG EC tablet Take 81 mg by mouth once daily.      cetirizine 10 mg chewable tablet Take 10 mg by mouth once daily.      cholecalciferol, vitamin D3, 125 mcg (5,000 unit) Tab 5,000 Units once daily.       cyanocobalamin, vitamin B-12, 5,000 mcg TbDL Take 5,000 Units by mouth once daily.       LUCENTIS 0.5 mg/0.05 mL Syrg injection       magnesium oxide 500  mg Cap Take 1 capsule by mouth once daily.      salmeteroL (SEREVENT) 50 mcg/dose diskus inhaler Inhale 1 puff into the lungs 2 (two) times daily. Controller 3 each 3    turmeric root extract 500 mg Cap Take 500 mg by mouth once daily.       amLODIPine (NORVASC) 2.5 MG tablet Take 1 tablet (2.5 mg total) by mouth once daily. 90 tablet 1    icosapent ethyL (VASCEPA) 1 gram Cap TAKE 2 CAPSULES (2 GRAMS) TWICE A  capsule 1    nebivoloL (BYSTOLIC) 5 MG Tab Take 1 tablet (5 mg total) by mouth once daily. 90 tablet 1    triamterene-hydrochlorothiazide 37.5-25 mg (MAXZIDE-25) 37.5-25 mg per tablet Take 1 tablet by mouth once daily. 90 tablet 1     No current facility-administered medications for this visit.     Review of patient's allergies indicates:   Allergen Reactions    Adhesive Hives    Lisinopril Other (See Comments)     Chest pain    Meloxicam     Meperidine     Oxycodone-acetaminophen       Past Medical History:   Diagnosis Date    Anesthesia 2009    Pt reports severe drop in BP when anesthesia given.    Asthma     Diverticulitis     GERD (gastroesophageal reflux disease)     Hypertension     Neuropathy     Stroke     RETINACULAR      Past Surgical History:   Procedure Laterality Date     SECTION  1990    CHOLECYSTECTOMY      HYSTERECTOMY      INSERTION OF URETERAL CATHETER N/A 10/16/2019    Procedure: INSERTION, CATHETER, URETER;  Surgeon: Willard Ruffin MD;  Location: [facility] OR;  Service: Urology;  Laterality: N/A;    LAPAROSCOPIC SIGMOIDECTOMY N/A 10/16/2019    Procedure: COLECTOMY, SIGMOID, LAPAROSCOPIC;  Surgeon: Doug Gifford MD;  Location: [facility] OR;  Service: General;  Laterality: N/A;    SHOULDER ARTHROSCOPY Bilateral     STOMACH SURGERY      Possible Nissen per pt- To treat acid reflux     TUBAL LIGATION  1990       Review of Systems   Constitutional:  Positive for fatigue. Negative for activity change, appetite change and unexpected weight change.        Feels her  "cardiac issues & fatigue started s/p Covid   HENT:  Negative for congestion, ear pain, hearing loss, postnasal drip, rhinorrhea, sinus pressure, sinus pain, sneezing, sore throat and trouble swallowing.    Eyes:  Negative for photophobia, pain, discharge and visual disturbance.   Respiratory:  Positive for chest tightness (at times). Negative for cough, shortness of breath and wheezing.    Cardiovascular:  Negative for chest pain, palpitations and leg swelling.        Chest flutter at times, states afib runs in her family   Gastrointestinal:  Negative for abdominal distention, abdominal pain, blood in stool, constipation, diarrhea, nausea and vomiting.   Endocrine: Negative for cold intolerance, heat intolerance, polydipsia and polyuria.   Genitourinary:  Negative for difficulty urinating, dysuria, flank pain, frequency, hematuria, menstrual problem, pelvic pain and urgency.   Musculoskeletal:  Positive for arthralgias. Negative for back pain, joint swelling, myalgias and neck pain.   Skin:  Negative for pallor.   Allergic/Immunologic: Negative for environmental allergies and food allergies.   Neurological:  Negative for dizziness, weakness, light-headedness, numbness and headaches.   Hematological:  Does not bruise/bleed easily.   Psychiatric/Behavioral:  Negative for agitation, confusion, decreased concentration, dysphoric mood and sleep disturbance. The patient is not nervous/anxious.     OBJECTIVE:      Vitals:    11/09/22 1103   BP: 122/84   BP Location: Right arm   Patient Position: Sitting   BP Method: Large (Manual)   Pulse: 67   SpO2: 98%   Weight: 82.1 kg (181 lb 1.6 oz)   Height: 5' 4" (1.626 m)     Physical Exam  Vitals and nursing note reviewed.   Constitutional:       General: She is not in acute distress.     Appearance: Normal appearance. She is well-developed. She is obese.   HENT:      Head: Normocephalic and atraumatic.      Right Ear: Hearing normal.      Left Ear: Hearing normal.      Nose: Nose " normal. No rhinorrhea.   Eyes:      General: Lids are normal.         Right eye: No discharge.         Left eye: No discharge.      Conjunctiva/sclera: Conjunctivae normal.      Right eye: Right conjunctiva is not injected.      Left eye: Left conjunctiva is not injected.      Pupils: Pupils are equal, round, and reactive to light. Pupils are equal.      Right eye: Pupil is round and reactive.      Left eye: Pupil is round and reactive.   Neck:      Thyroid: No thyromegaly.      Vascular: No JVD.      Trachea: Trachea normal. No tracheal deviation.   Cardiovascular:      Rate and Rhythm: Regular rhythm. Bradycardia present.      Pulses:           Radial pulses are 2+ on the right side and 2+ on the left side.      Heart sounds: Normal heart sounds. No murmur heard.    No friction rub. No gallop.   Pulmonary:      Effort: Pulmonary effort is normal. No respiratory distress.      Breath sounds: Normal breath sounds. No stridor. No decreased breath sounds, wheezing, rhonchi or rales.   Abdominal:      General: Bowel sounds are normal. There is no distension.      Palpations: Abdomen is soft. Abdomen is not rigid.      Tenderness: There is no abdominal tenderness. There is no guarding.   Musculoskeletal:         General: Normal range of motion.      Cervical back: Normal range of motion and neck supple.   Lymphadenopathy:      Cervical: No cervical adenopathy.   Skin:     General: Skin is warm and dry.      Capillary Refill: Capillary refill takes less than 2 seconds.      Coloration: Skin is not pale.      Findings: No lesion or rash.   Neurological:      Mental Status: She is alert and oriented to person, place, and time.      Motor: No atrophy.      Coordination: Coordination normal.      Gait: Gait normal.   Psychiatric:         Attention and Perception: She is attentive.         Speech: Speech normal.         Behavior: Behavior normal.         Thought Content: Thought content normal.         Judgment: Judgment  normal.      Assessment:       1. Routine health maintenance        Plan:       Routine health maintenance  -     CBC Auto Differential; Future; Expected date: 11/16/2022  -     Comprehensive Metabolic Panel; Future; Expected date: 11/09/2022  -     Lipid Panel; Future; Expected date: 11/09/2022  -     TSH; Future; Expected date: 11/09/2022  -     T4, Free; Future; Expected date: 11/09/2022  - stable on current med regimen  - will call with lab results  - continue to follow with appropriate specialists  - Limit: red meat, butter, fried foods, cheese, and other foods that have a lot of saturated fat. Consume more: lean meats, fish, fruits, vegetables, whole grains, beans, lentils, and nuts. Weight loss and adequate daily hydration.  - Instructed on guidelines recommending 150 minutes per week of brisk exercise and healthy lifestyle. Recommended well screenings (including immunizations) reviewed with patient. Discussed outstanding health maintenance and rationale for completion.            Follow up in about 1 year (around 11/9/2023) for well exam.      11/9/2022 CHITO Shea, FNP-C

## 2022-11-14 ENCOUNTER — LAB VISIT (OUTPATIENT)
Dept: LAB | Facility: HOSPITAL | Age: 57
End: 2022-11-14
Attending: NURSE PRACTITIONER
Payer: COMMERCIAL

## 2022-11-14 DIAGNOSIS — I49.8 PERIODIC HEART FLUTTER: ICD-10-CM

## 2022-11-14 DIAGNOSIS — Z00.00 ROUTINE HEALTH MAINTENANCE: ICD-10-CM

## 2022-11-14 DIAGNOSIS — R06.02 SOB (SHORTNESS OF BREATH): ICD-10-CM

## 2022-11-14 DIAGNOSIS — R53.82 CHRONIC FATIGUE: ICD-10-CM

## 2022-11-14 DIAGNOSIS — E78.1 PURE HYPERTRIGLYCERIDEMIA: ICD-10-CM

## 2022-11-14 LAB
25(OH)D3+25(OH)D2 SERPL-MCNC: 82 NG/ML (ref 30–96)
ALBUMIN SERPL BCP-MCNC: 4.3 G/DL (ref 3.5–5.2)
ALP SERPL-CCNC: 43 U/L (ref 55–135)
ALT SERPL W/O P-5'-P-CCNC: 26 U/L (ref 10–44)
ANION GAP SERPL CALC-SCNC: 6 MMOL/L (ref 8–16)
AST SERPL-CCNC: 42 U/L (ref 10–40)
BASOPHILS # BLD AUTO: 0.01 K/UL (ref 0–0.2)
BASOPHILS NFR BLD: 0.3 % (ref 0–1.9)
BILIRUB SERPL-MCNC: 0.6 MG/DL (ref 0.1–1)
BUN SERPL-MCNC: 21 MG/DL (ref 6–20)
CALCIUM SERPL-MCNC: 9.3 MG/DL (ref 8.7–10.5)
CHLORIDE SERPL-SCNC: 106 MMOL/L (ref 95–110)
CHOLEST SERPL-MCNC: 158 MG/DL (ref 120–199)
CHOLEST/HDLC SERPL: 3.4 {RATIO} (ref 2–5)
CO2 SERPL-SCNC: 27 MMOL/L (ref 23–29)
CREAT SERPL-MCNC: 0.7 MG/DL (ref 0.5–1.4)
DIFFERENTIAL METHOD: ABNORMAL
EOSINOPHIL # BLD AUTO: 0.1 K/UL (ref 0–0.5)
EOSINOPHIL NFR BLD: 1.8 % (ref 0–8)
ERYTHROCYTE [DISTWIDTH] IN BLOOD BY AUTOMATED COUNT: 11.6 % (ref 11.5–14.5)
EST. GFR  (NO RACE VARIABLE): >60 ML/MIN/1.73 M^2
FERRITIN SERPL-MCNC: 95 NG/ML (ref 20–300)
FOLATE SERPL-MCNC: >24.8 NG/ML (ref 4–24)
GLUCOSE SERPL-MCNC: 101 MG/DL (ref 70–110)
HCT VFR BLD AUTO: 39.9 % (ref 37–48.5)
HDLC SERPL-MCNC: 46 MG/DL (ref 40–75)
HDLC SERPL: 29.1 % (ref 20–50)
HGB BLD-MCNC: 14.4 G/DL (ref 12–16)
IMM GRANULOCYTES # BLD AUTO: 0.01 K/UL (ref 0–0.04)
IMM GRANULOCYTES NFR BLD AUTO: 0.3 % (ref 0–0.5)
IRON SERPL-MCNC: 77 UG/DL (ref 30–160)
LDLC SERPL CALC-MCNC: 85.6 MG/DL (ref 63–159)
LYMPHOCYTES # BLD AUTO: 1.5 K/UL (ref 1–4.8)
LYMPHOCYTES NFR BLD: 38.8 % (ref 18–48)
MCH RBC QN AUTO: 34.4 PG (ref 27–31)
MCHC RBC AUTO-ENTMCNC: 36.1 G/DL (ref 32–36)
MCV RBC AUTO: 96 FL (ref 82–98)
MONOCYTES # BLD AUTO: 0.2 K/UL (ref 0.3–1)
MONOCYTES NFR BLD: 6.1 % (ref 4–15)
NEUTROPHILS # BLD AUTO: 2.1 K/UL (ref 1.8–7.7)
NEUTROPHILS NFR BLD: 52.7 % (ref 38–73)
NONHDLC SERPL-MCNC: 112 MG/DL
NRBC BLD-RTO: 0 /100 WBC
PLATELET # BLD AUTO: 179 K/UL (ref 150–450)
PMV BLD AUTO: 8.4 FL (ref 9.2–12.9)
POTASSIUM SERPL-SCNC: 4.1 MMOL/L (ref 3.5–5.1)
PROT SERPL-MCNC: 6.9 G/DL (ref 6–8.4)
RBC # BLD AUTO: 4.18 M/UL (ref 4–5.4)
SODIUM SERPL-SCNC: 139 MMOL/L (ref 136–145)
T4 FREE SERPL-MCNC: 1.07 NG/DL (ref 0.71–1.51)
TRIGL SERPL-MCNC: 132 MG/DL (ref 30–150)
TSH SERPL DL<=0.005 MIU/L-ACNC: 2 UIU/ML (ref 0.34–5.6)
VIT B12 SERPL-MCNC: >1500 PG/ML (ref 210–950)
WBC # BLD AUTO: 3.94 K/UL (ref 3.9–12.7)

## 2022-11-14 PROCEDURE — 82306 VITAMIN D 25 HYDROXY: CPT | Performed by: NURSE PRACTITIONER

## 2022-11-14 PROCEDURE — 80053 COMPREHEN METABOLIC PANEL: CPT | Performed by: NURSE PRACTITIONER

## 2022-11-14 PROCEDURE — 86376 MICROSOMAL ANTIBODY EACH: CPT | Performed by: NURSE PRACTITIONER

## 2022-11-14 PROCEDURE — 84443 ASSAY THYROID STIM HORMONE: CPT | Performed by: NURSE PRACTITIONER

## 2022-11-14 PROCEDURE — 82607 VITAMIN B-12: CPT | Performed by: NURSE PRACTITIONER

## 2022-11-14 PROCEDURE — 83036 HEMOGLOBIN GLYCOSYLATED A1C: CPT | Performed by: NURSE PRACTITIONER

## 2022-11-14 PROCEDURE — 84439 ASSAY OF FREE THYROXINE: CPT | Performed by: NURSE PRACTITIONER

## 2022-11-14 PROCEDURE — 80061 LIPID PANEL: CPT | Performed by: NURSE PRACTITIONER

## 2022-11-14 PROCEDURE — 82746 ASSAY OF FOLIC ACID SERUM: CPT | Performed by: NURSE PRACTITIONER

## 2022-11-14 PROCEDURE — 83540 ASSAY OF IRON: CPT | Performed by: NURSE PRACTITIONER

## 2022-11-14 PROCEDURE — 85025 COMPLETE CBC W/AUTO DIFF WBC: CPT | Performed by: NURSE PRACTITIONER

## 2022-11-14 PROCEDURE — 82728 ASSAY OF FERRITIN: CPT | Performed by: NURSE PRACTITIONER

## 2022-11-15 LAB
ESTIMATED AVG GLUCOSE: 103 MG/DL (ref 68–131)
HBA1C MFR BLD: 5.2 % (ref 4.5–6.2)
THYROPEROXIDASE AB SERPL-ACNC: <9 IU/ML (ref 0–34)

## 2022-11-18 ENCOUNTER — HOSPITAL ENCOUNTER (EMERGENCY)
Facility: HOSPITAL | Age: 57
Discharge: HOME | End: 2022-11-18
Attending: EMERGENCY MEDICINE
Payer: COMMERCIAL

## 2022-11-18 VITALS
DIASTOLIC BLOOD PRESSURE: 71 MMHG | BODY MASS INDEX: 30.55 KG/M2 | RESPIRATION RATE: 20 BRPM | OXYGEN SATURATION: 100 % | HEART RATE: 71 BPM | TEMPERATURE: 98 F | WEIGHT: 178 LBS | SYSTOLIC BLOOD PRESSURE: 142 MMHG
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
## 2022-11-18 DIAGNOSIS — S22.22XA CLOSED FRACTURE OF BODY OF STERNUM, INITIAL ENCOUNTER: Primary | ICD-10-CM

## 2022-11-18 PROCEDURE — 25000003 PHARM REV CODE 250: Performed by: EMERGENCY MEDICINE

## 2022-11-18 PROCEDURE — 99283 EMERGENCY DEPT VISIT LOW MDM: CPT | Mod: 25

## 2022-11-18 RX ORDER — IBUPROFEN 400 MG/1
800 TABLET ORAL
Status: COMPLETED | OUTPATIENT
Start: 2022-11-18 | End: 2022-11-18

## 2022-11-18 RX ORDER — HYDROCODONE BITARTRATE AND ACETAMINOPHEN 5; 325 MG/1; MG/1
1 TABLET ORAL EVERY 4 HOURS PRN
Qty: 8 TABLET | Refills: 0 | Status: SHIPPED | OUTPATIENT
Start: 2022-11-18 | End: 2022-11-28

## 2022-11-18 RX ADMIN — IBUPROFEN 800 MG: 400 TABLET, FILM COATED ORAL at 07:11
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
## 2022-11-28 ENCOUNTER — TELEPHONE (OUTPATIENT)
Dept: FAMILY MEDICINE | Facility: CLINIC | Age: 57
End: 2022-11-28
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
## 2022-11-28 NOTE — TELEPHONE ENCOUNTER
----- Message from Shira Rodriguez LPN sent at 11/21/2022 12:01 PM CST -----  Regarding: appt  Please call patient She was seen in ED on 11/18   Requesting a return phone call [phone]

## 2022-11-29 ENCOUNTER — OFFICE VISIT (OUTPATIENT)
Dept: FAMILY MEDICINE | Facility: CLINIC | Age: 57
End: 2022-11-29
Payer: COMMERCIAL

## 2022-11-29 ENCOUNTER — HOSPITAL ENCOUNTER (OUTPATIENT)
Dept: RADIOLOGY | Facility: HOSPITAL | Age: 57
Discharge: HOME | End: 2022-11-29
Attending: ORTHOPAEDIC SURGERY
Payer: COMMERCIAL

## 2022-11-29 ENCOUNTER — OFFICE VISIT (OUTPATIENT)
Dept: ORTHOPEDICS | Facility: CLINIC | Age: 57
End: 2022-11-29
Payer: COMMERCIAL

## 2022-11-29 VITALS
HEIGHT: 64 IN | WEIGHT: 185.5 LBS | DIASTOLIC BLOOD PRESSURE: 72 MMHG | SYSTOLIC BLOOD PRESSURE: 114 MMHG | OXYGEN SATURATION: 97 % | HEART RATE: 74 BPM | BODY MASS INDEX: 31.67 KG/M2

## 2022-11-29 VITALS — BODY MASS INDEX: 30.39 KG/M2 | WEIGHT: 178 LBS | RESPIRATION RATE: 16 BRPM | HEIGHT: 64 IN
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
## 2022-11-29 DIAGNOSIS — M25.561 ACUTE PAIN OF RIGHT KNEE: ICD-10-CM
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
## 2022-11-29 DIAGNOSIS — M25.511 ACUTE PAIN OF RIGHT SHOULDER: ICD-10-CM

## 2022-11-29 PROCEDURE — 1160F RVW MEDS BY RX/DR IN RCRD: CPT | Mod: CPTII,S$GLB,, | Performed by: NURSE PRACTITIONER

## 2022-11-29 PROCEDURE — 73562 XR KNEE ORTHO RIGHT WITH FLEXION: ICD-10-PCS | Mod: 26,LT,, | Performed by: RADIOLOGY

## 2022-11-29 PROCEDURE — 3078F DIAST BP <80 MM HG: CPT | Mod: CPTII,S$GLB,, | Performed by: NURSE PRACTITIONER

## 2022-11-29 PROCEDURE — 99213 OFFICE O/P EST LOW 20 MIN: CPT | Mod: S$GLB,,, | Performed by: ORTHOPAEDIC SURGERY

## 2022-11-29 PROCEDURE — 99999 PR PBB SHADOW E&M-EST. PATIENT-LVL III: ICD-10-PCS | Mod: PBBFAC,,, | Performed by: ORTHOPAEDIC SURGERY

## 2022-11-29 PROCEDURE — 3078F PR MOST RECENT DIASTOLIC BLOOD PRESSURE < 80 MM HG: ICD-10-PCS | Mod: CPTII,S$GLB,, | Performed by: NURSE PRACTITIONER

## 2022-11-29 PROCEDURE — 3044F PR MOST RECENT HEMOGLOBIN A1C LEVEL <7.0%: ICD-10-PCS | Mod: CPTII,S$GLB,, | Performed by: ORTHOPAEDIC SURGERY

## 2022-11-29 PROCEDURE — 73564 XR KNEE ORTHO RIGHT WITH FLEXION: ICD-10-PCS | Mod: 26,RT,, | Performed by: RADIOLOGY

## 2022-11-29 PROCEDURE — 73564 X-RAY EXAM KNEE 4 OR MORE: CPT | Mod: 26,RT,, | Performed by: RADIOLOGY

## 2022-11-29 PROCEDURE — 99213 PR OFFICE/OUTPT VISIT, EST, LEVL III, 20-29 MIN: ICD-10-PCS | Mod: S$GLB,,, | Performed by: NURSE PRACTITIONER

## 2022-11-29 PROCEDURE — 3008F PR BODY MASS INDEX (BMI) DOCUMENTED: ICD-10-PCS | Mod: CPTII,S$GLB,, | Performed by: NURSE PRACTITIONER

## 2022-11-29 PROCEDURE — 1159F PR MEDICATION LIST DOCUMENTED IN MEDICAL RECORD: ICD-10-PCS | Mod: CPTII,S$GLB,, | Performed by: NURSE PRACTITIONER

## 2022-11-29 PROCEDURE — 3044F PR MOST RECENT HEMOGLOBIN A1C LEVEL <7.0%: ICD-10-PCS | Mod: CPTII,S$GLB,, | Performed by: NURSE PRACTITIONER

## 2022-11-29 PROCEDURE — 73562 X-RAY EXAM OF KNEE 3: CPT | Mod: 26,LT,, | Performed by: RADIOLOGY

## 2022-11-29 PROCEDURE — 3008F PR BODY MASS INDEX (BMI) DOCUMENTED: ICD-10-PCS | Mod: CPTII,S$GLB,, | Performed by: ORTHOPAEDIC SURGERY

## 2022-11-29 PROCEDURE — 3008F BODY MASS INDEX DOCD: CPT | Mod: CPTII,S$GLB,, | Performed by: ORTHOPAEDIC SURGERY

## 2022-11-29 PROCEDURE — 99213 PR OFFICE/OUTPT VISIT, EST, LEVL III, 20-29 MIN: ICD-10-PCS | Mod: S$GLB,,, | Performed by: ORTHOPAEDIC SURGERY

## 2022-11-29 PROCEDURE — 99213 OFFICE O/P EST LOW 20 MIN: CPT | Mod: S$GLB,,, | Performed by: NURSE PRACTITIONER

## 2022-11-29 PROCEDURE — 3074F PR MOST RECENT SYSTOLIC BLOOD PRESSURE < 130 MM HG: ICD-10-PCS | Mod: CPTII,S$GLB,, | Performed by: NURSE PRACTITIONER

## 2022-11-29 PROCEDURE — 73030 X-RAY EXAM OF SHOULDER: CPT | Mod: TC,FY,RT

## 2022-11-29 PROCEDURE — 73562 X-RAY EXAM OF KNEE 3: CPT | Mod: TC,FY,LT

## 2022-11-29 PROCEDURE — 1159F MED LIST DOCD IN RCRD: CPT | Mod: CPTII,S$GLB,, | Performed by: NURSE PRACTITIONER

## 2022-11-29 PROCEDURE — 3008F BODY MASS INDEX DOCD: CPT | Mod: CPTII,S$GLB,, | Performed by: NURSE PRACTITIONER

## 2022-11-29 PROCEDURE — 3044F HG A1C LEVEL LT 7.0%: CPT | Mod: CPTII,S$GLB,, | Performed by: ORTHOPAEDIC SURGERY

## 2022-11-29 PROCEDURE — 73030 XR SHOULDER TRAUMA 3 VIEW RIGHT: ICD-10-PCS | Mod: 26,RT,, | Performed by: RADIOLOGY

## 2022-11-29 PROCEDURE — 3044F HG A1C LEVEL LT 7.0%: CPT | Mod: CPTII,S$GLB,, | Performed by: NURSE PRACTITIONER

## 2022-11-29 PROCEDURE — 3074F SYST BP LT 130 MM HG: CPT | Mod: CPTII,S$GLB,, | Performed by: NURSE PRACTITIONER

## 2022-11-29 PROCEDURE — 1159F PR MEDICATION LIST DOCUMENTED IN MEDICAL RECORD: ICD-10-PCS | Mod: CPTII,S$GLB,, | Performed by: ORTHOPAEDIC SURGERY

## 2022-11-29 PROCEDURE — 1160F PR REVIEW ALL MEDS BY PRESCRIBER/CLIN PHARMACIST DOCUMENTED: ICD-10-PCS | Mod: CPTII,S$GLB,, | Performed by: NURSE PRACTITIONER

## 2022-11-29 PROCEDURE — 73030 X-RAY EXAM OF SHOULDER: CPT | Mod: 26,RT,, | Performed by: RADIOLOGY

## 2022-11-29 PROCEDURE — 99999 PR PBB SHADOW E&M-EST. PATIENT-LVL III: CPT | Mod: PBBFAC,,, | Performed by: ORTHOPAEDIC SURGERY

## 2022-11-29 PROCEDURE — 1159F MED LIST DOCD IN RCRD: CPT | Mod: CPTII,S$GLB,, | Performed by: ORTHOPAEDIC SURGERY

## 2022-11-29 RX ORDER — CELECOXIB 100 MG/1
100 CAPSULE ORAL DAILY
Qty: 30 CAPSULE | Refills: 2 | Status: SHIPPED | OUTPATIENT
Start: 2022-11-29 | End: 2023-03-13 | Stop reason: ALTCHOICE

## 2022-11-29 RX ORDER — CYCLOBENZAPRINE HCL 10 MG
10 TABLET ORAL 3 TIMES DAILY PRN
Qty: 30 TABLET | Refills: 2 | Status: SHIPPED | OUTPATIENT
Start: 2022-11-29 | End: 2023-03-13 | Stop reason: ALTCHOICE

## 2022-11-29 RX ORDER — CYCLOBENZAPRINE HCL 10 MG
10 TABLET ORAL 2 TIMES DAILY PRN
Qty: 28 TABLET | Refills: 0 | Status: SHIPPED | OUTPATIENT
Start: 2022-11-29 | End: 2022-11-29 | Stop reason: SDUPTHER

## 2022-11-29 RX ORDER — METHYLPREDNISOLONE 4 MG/1
TABLET ORAL
Qty: 21 EACH | Refills: 0 | Status: SHIPPED | OUTPATIENT
Start: 2022-11-29 | End: 2022-12-20
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
## 2023-01-17 ENCOUNTER — OFFICE VISIT (OUTPATIENT)
Dept: ORTHOPEDICS | Facility: CLINIC | Age: 58
End: 2023-01-17
Payer: COMMERCIAL

## 2023-01-17 VITALS — WEIGHT: 185.44 LBS | BODY MASS INDEX: 31.66 KG/M2 | HEIGHT: 64 IN

## 2023-01-17 DIAGNOSIS — S83.241A ACUTE MEDIAL MENISCUS TEAR OF RIGHT KNEE, INITIAL ENCOUNTER: Primary | ICD-10-CM

## 2023-01-17 PROCEDURE — 3008F PR BODY MASS INDEX (BMI) DOCUMENTED: ICD-10-PCS | Mod: CPTII,S$GLB,, | Performed by: ORTHOPAEDIC SURGERY

## 2023-01-17 PROCEDURE — 99999 PR PBB SHADOW E&M-EST. PATIENT-LVL IV: CPT | Mod: PBBFAC,,, | Performed by: ORTHOPAEDIC SURGERY

## 2023-01-17 PROCEDURE — 99213 PR OFFICE/OUTPT VISIT, EST, LEVL III, 20-29 MIN: ICD-10-PCS | Mod: S$GLB,,, | Performed by: ORTHOPAEDIC SURGERY

## 2023-01-17 PROCEDURE — 3008F BODY MASS INDEX DOCD: CPT | Mod: CPTII,S$GLB,, | Performed by: ORTHOPAEDIC SURGERY

## 2023-01-17 PROCEDURE — 99213 OFFICE O/P EST LOW 20 MIN: CPT | Mod: S$GLB,,, | Performed by: ORTHOPAEDIC SURGERY

## 2023-01-17 PROCEDURE — 99999 PR PBB SHADOW E&M-EST. PATIENT-LVL IV: ICD-10-PCS | Mod: PBBFAC,,, | Performed by: ORTHOPAEDIC SURGERY

## 2023-01-17 PROCEDURE — 1159F MED LIST DOCD IN RCRD: CPT | Mod: CPTII,S$GLB,, | Performed by: ORTHOPAEDIC SURGERY

## 2023-01-17 PROCEDURE — 1159F PR MEDICATION LIST DOCUMENTED IN MEDICAL RECORD: ICD-10-PCS | Mod: CPTII,S$GLB,, | Performed by: ORTHOPAEDIC SURGERY
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
## 2023-01-20 ENCOUNTER — HOSPITAL ENCOUNTER (OUTPATIENT)
Dept: RADIOLOGY | Facility: HOSPITAL | Age: 58
Discharge: HOME | End: 2023-01-20
Attending: ORTHOPAEDIC SURGERY
Payer: COMMERCIAL

## 2023-01-20 DIAGNOSIS — S83.241A ACUTE MEDIAL MENISCUS TEAR OF RIGHT KNEE, INITIAL ENCOUNTER: ICD-10-CM

## 2023-01-20 PROCEDURE — 73721 MRI JNT OF LWR EXTRE W/O DYE: CPT | Mod: 26,RT,, | Performed by: RADIOLOGY

## 2023-01-20 PROCEDURE — 73721 MRI KNEE WITHOUT CONTRAST RIGHT: ICD-10-PCS | Mod: 26,RT,, | Performed by: RADIOLOGY

## 2023-01-20 PROCEDURE — 73721 MRI JNT OF LWR EXTRE W/O DYE: CPT | Mod: TC,RT

## 2023-01-24 ENCOUNTER — OFFICE VISIT (OUTPATIENT)
Dept: ORTHOPEDICS | Facility: CLINIC | Age: 58
End: 2023-01-24
Payer: COMMERCIAL

## 2023-01-24 VITALS — BODY MASS INDEX: 31.58 KG/M2 | WEIGHT: 185 LBS | RESPIRATION RATE: 18 BRPM | HEIGHT: 64 IN

## 2023-01-24 DIAGNOSIS — M23.241 DERANGEMENT OF ANTERIOR HORN OF LATERAL MENISCUS DUE TO OLD TEAR OR INJURY, RIGHT KNEE: Primary | ICD-10-CM

## 2023-01-24 PROCEDURE — 99214 OFFICE O/P EST MOD 30 MIN: CPT | Mod: S$GLB,,, | Performed by: ORTHOPAEDIC SURGERY

## 2023-01-24 PROCEDURE — 3008F BODY MASS INDEX DOCD: CPT | Mod: CPTII,S$GLB,, | Performed by: ORTHOPAEDIC SURGERY

## 2023-01-24 PROCEDURE — 99214 PR OFFICE/OUTPT VISIT, EST, LEVL IV, 30-39 MIN: ICD-10-PCS | Mod: S$GLB,,, | Performed by: ORTHOPAEDIC SURGERY

## 2023-01-24 PROCEDURE — 3008F PR BODY MASS INDEX (BMI) DOCUMENTED: ICD-10-PCS | Mod: CPTII,S$GLB,, | Performed by: ORTHOPAEDIC SURGERY

## 2023-01-24 PROCEDURE — 99999 PR PBB SHADOW E&M-EST. PATIENT-LVL II: CPT | Mod: PBBFAC,,, | Performed by: ORTHOPAEDIC SURGERY

## 2023-01-24 PROCEDURE — 99999 PR PBB SHADOW E&M-EST. PATIENT-LVL II: ICD-10-PCS | Mod: PBBFAC,,, | Performed by: ORTHOPAEDIC SURGERY
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
## 2023-01-26 ENCOUNTER — PATIENT MESSAGE (OUTPATIENT)
Dept: ORTHOPEDICS | Facility: CLINIC | Age: 58
End: 2023-01-26
Payer: COMMERCIAL

## 2023-01-26 ENCOUNTER — PATIENT MESSAGE (OUTPATIENT)
Dept: FAMILY MEDICINE | Facility: CLINIC | Age: 58
End: 2023-01-26

## 2023-01-31 ENCOUNTER — OFFICE VISIT (OUTPATIENT)
Dept: ORTHOPEDICS | Facility: CLINIC | Age: 58
End: 2023-01-31
Payer: COMMERCIAL

## 2023-01-31 VITALS — BODY MASS INDEX: 31.58 KG/M2 | HEIGHT: 64 IN | WEIGHT: 185 LBS

## 2023-01-31 DIAGNOSIS — M23.241 DERANGEMENT OF ANTERIOR HORN OF LATERAL MENISCUS DUE TO OLD TEAR OR INJURY, RIGHT KNEE: Primary | ICD-10-CM

## 2023-01-31 PROCEDURE — 3008F BODY MASS INDEX DOCD: CPT | Mod: CPTII,S$GLB,, | Performed by: ORTHOPAEDIC SURGERY

## 2023-01-31 PROCEDURE — 99999 PR PBB SHADOW E&M-EST. PATIENT-LVL II: CPT | Mod: PBBFAC,,, | Performed by: ORTHOPAEDIC SURGERY

## 2023-01-31 PROCEDURE — 99999 PR PBB SHADOW E&M-EST. PATIENT-LVL II: ICD-10-PCS | Mod: PBBFAC,,, | Performed by: ORTHOPAEDIC SURGERY

## 2023-01-31 PROCEDURE — 3008F PR BODY MASS INDEX (BMI) DOCUMENTED: ICD-10-PCS | Mod: CPTII,S$GLB,, | Performed by: ORTHOPAEDIC SURGERY

## 2023-01-31 PROCEDURE — 20610 PR DRAIN/INJECT LARGE JOINT/BURSA: ICD-10-PCS | Mod: RT,S$GLB,, | Performed by: ORTHOPAEDIC SURGERY

## 2023-01-31 PROCEDURE — 99499 NO LOS: ICD-10-PCS | Mod: S$GLB,,, | Performed by: ORTHOPAEDIC SURGERY

## 2023-01-31 PROCEDURE — 20610 DRAIN/INJ JOINT/BURSA W/O US: CPT | Mod: RT,S$GLB,, | Performed by: ORTHOPAEDIC SURGERY

## 2023-01-31 PROCEDURE — 99499 UNLISTED E&M SERVICE: CPT | Mod: S$GLB,,, | Performed by: ORTHOPAEDIC SURGERY

## 2023-01-31 RX ORDER — TRIAMCINOLONE ACETONIDE 40 MG/ML
40 INJECTION, SUSPENSION INTRA-ARTICULAR; INTRAMUSCULAR
Status: DISCONTINUED | OUTPATIENT
Start: 2023-01-31 | End: 2023-01-31 | Stop reason: HOSPADM

## 2023-01-31 RX ADMIN — TRIAMCINOLONE ACETONIDE 40 MG: 40 INJECTION, SUSPENSION INTRA-ARTICULAR; INTRAMUSCULAR at 10:01

## 2023-01-31 NOTE — PROCEDURES
Large Joint Aspiration/Injection: R knee joint    Date/Time: 1/31/2023 10:30 AM  Performed by: Davidson Grover MD  Authorized by: Davidson Grover MD     Consent Done?:  Yes (Verbal)  Indications:  Pain  Site marked: the procedure site was marked    Timeout: prior to procedure the correct patient, procedure, and site was verified    Local anesthetic:  Lidocaine spray and lidocaine 1% without epinephrine  Anesthetic total (ml):  3      Details:  Needle Size:  22 G  Approach:  Anterolateral  Location:  Knee  Site:  R knee joint  Medications:  40 mg triamcinolone acetonide 40 mg/mL  Patient tolerance:  Patient tolerated the procedure well with no immediate complications

## 2023-03-01 ENCOUNTER — PATIENT MESSAGE (OUTPATIENT)
Dept: FAMILY MEDICINE | Facility: CLINIC | Age: 58
End: 2023-03-01

## 2023-03-13 ENCOUNTER — OFFICE VISIT (OUTPATIENT)
Dept: FAMILY MEDICINE | Facility: CLINIC | Age: 58
End: 2023-03-13
Payer: COMMERCIAL

## 2023-03-13 VITALS
BODY MASS INDEX: 31.33 KG/M2 | SYSTOLIC BLOOD PRESSURE: 134 MMHG | WEIGHT: 183.5 LBS | HEART RATE: 72 BPM | HEIGHT: 64 IN | DIASTOLIC BLOOD PRESSURE: 72 MMHG | TEMPERATURE: 98 F | RESPIRATION RATE: 20 BRPM

## 2023-03-13 DIAGNOSIS — J01.40 ACUTE NON-RECURRENT PANSINUSITIS: ICD-10-CM

## 2023-03-13 DIAGNOSIS — J06.9 UPPER RESPIRATORY TRACT INFECTION, UNSPECIFIED TYPE: Primary | ICD-10-CM

## 2023-03-13 DIAGNOSIS — H92.03 OTALGIA OF BOTH EARS: ICD-10-CM

## 2023-03-13 LAB
CTP QC/QA: YES
CTP QC/QA: YES
POC MOLECULAR INFLUENZA A AGN: NEGATIVE
POC MOLECULAR INFLUENZA B AGN: NEGATIVE
SARS-COV-2 RDRP RESP QL NAA+PROBE: NEGATIVE

## 2023-03-13 PROCEDURE — 87502 INFLUENZA DNA AMP PROBE: CPT | Mod: QW,S$GLB,, | Performed by: NURSE PRACTITIONER

## 2023-03-13 PROCEDURE — 1159F PR MEDICATION LIST DOCUMENTED IN MEDICAL RECORD: ICD-10-PCS | Mod: CPTII,S$GLB,, | Performed by: NURSE PRACTITIONER

## 2023-03-13 PROCEDURE — 87502 POCT INFLUENZA A/B MOLECULAR: ICD-10-PCS | Mod: QW,S$GLB,, | Performed by: NURSE PRACTITIONER

## 2023-03-13 PROCEDURE — 87635 SARS-COV-2 COVID-19 AMP PRB: CPT | Mod: QW,S$GLB,, | Performed by: NURSE PRACTITIONER

## 2023-03-13 PROCEDURE — 3008F BODY MASS INDEX DOCD: CPT | Mod: CPTII,S$GLB,, | Performed by: NURSE PRACTITIONER

## 2023-03-13 PROCEDURE — 3075F SYST BP GE 130 - 139MM HG: CPT | Mod: CPTII,S$GLB,, | Performed by: NURSE PRACTITIONER

## 2023-03-13 PROCEDURE — 3078F DIAST BP <80 MM HG: CPT | Mod: CPTII,S$GLB,, | Performed by: NURSE PRACTITIONER

## 2023-03-13 PROCEDURE — 3008F PR BODY MASS INDEX (BMI) DOCUMENTED: ICD-10-PCS | Mod: CPTII,S$GLB,, | Performed by: NURSE PRACTITIONER

## 2023-03-13 PROCEDURE — 99213 PR OFFICE/OUTPT VISIT, EST, LEVL III, 20-29 MIN: ICD-10-PCS | Mod: S$GLB,,, | Performed by: NURSE PRACTITIONER

## 2023-03-13 PROCEDURE — 99213 OFFICE O/P EST LOW 20 MIN: CPT | Mod: S$GLB,,, | Performed by: NURSE PRACTITIONER

## 2023-03-13 PROCEDURE — 1159F MED LIST DOCD IN RCRD: CPT | Mod: CPTII,S$GLB,, | Performed by: NURSE PRACTITIONER

## 2023-03-13 PROCEDURE — 3075F PR MOST RECENT SYSTOLIC BLOOD PRESS GE 130-139MM HG: ICD-10-PCS | Mod: CPTII,S$GLB,, | Performed by: NURSE PRACTITIONER

## 2023-03-13 PROCEDURE — 3078F PR MOST RECENT DIASTOLIC BLOOD PRESSURE < 80 MM HG: ICD-10-PCS | Mod: CPTII,S$GLB,, | Performed by: NURSE PRACTITIONER

## 2023-03-13 PROCEDURE — 87635: ICD-10-PCS | Mod: QW,S$GLB,, | Performed by: NURSE PRACTITIONER

## 2023-03-13 RX ORDER — METHYLPREDNISOLONE 4 MG/1
TABLET ORAL
Qty: 21 EACH | Refills: 0 | Status: SHIPPED | OUTPATIENT
Start: 2023-03-13 | End: 2023-03-13 | Stop reason: SDUPTHER

## 2023-03-13 RX ORDER — METHYLPREDNISOLONE 4 MG/1
TABLET ORAL
Qty: 21 EACH | Refills: 0 | Status: SHIPPED | OUTPATIENT
Start: 2023-03-13 | End: 2023-04-03

## 2023-03-13 RX ORDER — CEFDINIR 300 MG/1
300 CAPSULE ORAL 2 TIMES DAILY
Qty: 20 CAPSULE | Refills: 0 | Status: SHIPPED | OUTPATIENT
Start: 2023-03-13 | End: 2023-03-23

## 2023-03-13 RX ORDER — CEFDINIR 300 MG/1
300 CAPSULE ORAL 2 TIMES DAILY
Qty: 20 CAPSULE | Refills: 0 | Status: SHIPPED | OUTPATIENT
Start: 2023-03-13 | End: 2023-03-13 | Stop reason: SDUPTHER

## 2023-03-13 RX ORDER — PROGESTERONE 200 MG/1
200 CAPSULE ORAL NIGHTLY
COMMUNITY
Start: 2023-02-07

## 2023-03-13 RX ORDER — KETOROLAC TROMETHAMINE 4 MG/ML
1 SOLUTION/ DROPS OPHTHALMIC 4 TIMES DAILY
COMMUNITY
Start: 2023-03-01

## 2023-03-13 NOTE — PROGRESS NOTES
Patient ID: Daniela Lawson is a 57 y.o. female.    Chief Complaint: Cough, Nasal Congestion, and Otalgia (58 yo female c/o cough with congestion and yellowish mucus with bilateral ear pain, mainly left ear times 5 days. Pt states she started with GI issues on Wednesday but have a hx of diverticulitis. KM)    [facility] Lawson [facility] today for evaluation of upper respiratory infection. She denies fever or chills. She denies any other issues or complaints. When asked about sick contacts she states she has 6 grandchildren and they are always sick.      Cough  This is a new problem. The current episode started in the past 7 days. The problem has been waxing and waning. The problem occurs constantly. The cough is Productive of purulent sputum. Associated symptoms include ear pain and a sore throat. Pertinent negatives include no chest pain, eye redness, fever, headaches, myalgias, postnasal drip, rash, rhinorrhea or shortness of breath. Nothing aggravates the symptoms. She has tried a beta-agonist inhaler for the symptoms. The treatment provided mild relief. Her past medical history is significant for bronchitis and environmental allergies.   Otalgia   There is pain in both ears. This is a new problem. The current episode started in the past 7 days. The problem occurs hourly. The problem has been gradually worsening. There has been no fever. The pain is mild. Associated symptoms include abdominal pain, coughing, diarrhea and a sore throat. Pertinent negatives include no headaches, rash, rhinorrhea or vomiting. She has tried NSAIDs for the symptoms. The treatment provided mild relief. There is no history of a chronic ear infection, hearing loss or a tympanostomy tube.   URI   This is a new problem. The current episode started in the past 7 days. The problem has been waxing and waning. There has been no fever. Associated symptoms include abdominal pain, coughing, diarrhea, ear pain and a sore  throat. Pertinent negatives include no chest pain, congestion, dysuria, headaches, nausea, rash, rhinorrhea, sinus pain, sneezing or vomiting. She has tried nothing for the symptoms. The treatment provided no relief.         Past Medical History:   Diagnosis Date    Anesthesia 2009    Pt reports severe drop in BP when anesthesia given.    Asthma     Diverticulitis     GERD (gastroesophageal reflux disease)     Hypertension     Neuropathy 2007    Stroke     RETINACULAR      Past Surgical History:   Procedure Laterality Date     SECTION  1990    CHOLECYSTECTOMY      HYSTERECTOMY      INSERTION OF URETERAL CATHETER N/A 10/16/2019    Procedure: INSERTION, CATHETER, URETER;  Surgeon: Willard Ruffin MD;  Location: [facility];  Service: Urology;  Laterality: N/A;    LAPAROSCOPIC SIGMOIDECTOMY N/A 10/16/2019    Procedure: COLECTOMY, SIGMOID, LAPAROSCOPIC;  Surgeon: Doug Gifford MD;  Location: [facility];  Service: General;  Laterality: N/A;    SHOULDER ARTHROSCOPY Bilateral     STOMACH SURGERY      Possible Nissen per pt- To treat acid reflux     TUBAL LIGATION  1990         Tobacco History:  reports that she has never smoked. She has never used smokeless tobacco.      Review of patient's allergies indicates:   Allergen Reactions    Adhesive Hives    Lisinopril Other (See Comments)     Chest pain    Meloxicam     Meperidine     Oxycodone-acetaminophen        Current Outpatient Medications:     amLODIPine (NORVASC) 2.5 MG tablet, Take 1 tablet (2.5 mg total) by mouth once daily., Disp: 90 tablet, Rfl: 1    aspirin (ECOTRIN) 81 MG EC tablet, Take 81 mg by mouth once daily., Disp: , Rfl:     cetirizine 10 mg chewable tablet, Take 10 mg by mouth once daily., Disp: , Rfl:     cholecalciferol, vitamin D3, 125 mcg (5,000 unit) Tab, 5,000 Units once daily. , Disp: , Rfl:     cyanocobalamin, vitamin B-12, 5,000 mcg TbDL, Take 5,000 Units by mouth once daily. , Disp: , Rfl:     icosapent ethyL (VASCEPA) 1  gram Cap, TAKE 2 CAPSULES (2 GRAMS) TWICE A DAY, Disp: 360 capsule, Rfl: 1    ketorolac 0.4% (ACULAR) 0.4 % Drop, Place 1 drop into both eyes 4 (four) times daily., Disp: , Rfl:     LUCENTIS 0.5 mg/0.05 mL Syrg injection, , Disp: , Rfl:     magnesium oxide 500 mg Cap, Take 1 capsule by mouth once daily., Disp: , Rfl:     nebivoloL (BYSTOLIC) 5 MG Tab, Take 1 tablet (5 mg total) by mouth once daily., Disp: 90 tablet, Rfl: 1    progesterone (PROMETRIUM) 200 MG capsule, Take 200 mg by mouth., Disp: , Rfl:     salmeteroL (SEREVENT) 50 mcg/dose diskus inhaler, Inhale 1 puff into the lungs 2 (two) times daily. Controller, Disp: 3 each, Rfl: 3    triamterene-hydrochlorothiazide 37.5-25 mg (MAXZIDE-25) 37.5-25 mg per tablet, Take 1 tablet by mouth once daily., Disp: 90 tablet, Rfl: 1    turmeric root extract 500 mg Cap, Take 500 mg by mouth once daily. , Disp: , Rfl:     albuterol (PROVENTIL/VENTOLIN HFA) 90 mcg/actuation inhaler, Inhale 1-2 puffs into the lungs every 4 to 6 hours as needed for Wheezing or Shortness of Breath. Rescue, Disp: 18 g, Rfl: 3    cefdinir (OMNICEF) 300 MG capsule, Take 1 capsule (300 mg total) by mouth 2 (two) times daily. for 10 days, Disp: 20 capsule, Rfl: 0    cyclobenzaprine (FLEXERIL) 10 MG tablet, Take 1 tablet (10 mg total) by mouth 3 (three) times daily as needed for Muscle spasms., Disp: 30 tablet, Rfl: 2    methylPREDNISolone (MEDROL DOSEPACK) 4 mg tablet, use as directed, Disp: 21 each, Rfl: 0    Review of Systems   Constitutional:  Negative for activity change, appetite change, fatigue, fever and unexpected weight change.   HENT:  Positive for ear pain and sore throat. Negative for congestion, mouth sores, nosebleeds, postnasal drip, rhinorrhea, sinus pressure, sinus pain, sneezing and trouble swallowing.    Eyes:  Negative for pain, redness and itching.   Respiratory:  Positive for cough. Negative for chest tightness and shortness of breath.    Cardiovascular:  Negative for chest pain  "and palpitations.   Gastrointestinal:  Positive for abdominal pain and diarrhea. Negative for blood in stool, constipation, nausea and vomiting.   Endocrine: Negative for cold intolerance, heat intolerance, polydipsia, polyphagia and polyuria.   Genitourinary:  Negative for difficulty urinating, dysuria, flank pain, frequency, genital sores, menstrual problem, urgency, vaginal bleeding and vaginal discharge.   Musculoskeletal:  Negative for arthralgias and myalgias.   Skin:  Negative for rash and wound.   Allergic/Immunologic: Positive for environmental allergies. Negative for food allergies.   Neurological:  Negative for dizziness, light-headedness and headaches.   Hematological:  Negative for adenopathy. Does not bruise/bleed easily.   Psychiatric/Behavioral:  Negative for agitation, confusion, hallucinations, self-injury and sleep disturbance. The patient is not nervous/anxious.         Objective:      Vitals:    03/13/23 1341   BP: 134/72   Pulse: 72   Resp: 20   Temp: 97.9 °F (36.6 °C)   TempSrc: Oral   Weight: 83.2 kg (183 lb 8 oz)   Height: 5' 4" (1.626 m)     Physical Exam  Vitals reviewed.   Constitutional:       General: She is not in acute distress.     Appearance: Normal appearance. She is normal weight. She is ill-appearing. She is not toxic-appearing or diaphoretic.   HENT:      Head: Normocephalic and atraumatic.      Right Ear: External ear normal. A middle ear effusion is present. There is no impacted cerumen.      Left Ear: External ear normal. A middle ear effusion is present. There is no impacted cerumen.      Nose: Nose normal. No congestion or rhinorrhea.      Mouth/Throat:      Mouth: Mucous membranes are moist.      Pharynx: Oropharynx is clear. No oropharyngeal exudate or posterior oropharyngeal erythema.   Eyes:      General: No scleral icterus.        Right eye: No discharge.         Left eye: No discharge.      Extraocular Movements: Extraocular movements intact.      Conjunctiva/sclera: " Conjunctivae normal.      Pupils: Pupils are equal, round, and reactive to light.   Neck:      Vascular: No carotid bruit.   Cardiovascular:      Rate and Rhythm: Normal rate and regular rhythm.      Pulses: Normal pulses.      Heart sounds: Normal heart sounds. No murmur heard.    No friction rub. No gallop.   Pulmonary:      Effort: Pulmonary effort is normal. No respiratory distress.      Breath sounds: Normal breath sounds. No stridor. No rales.   Chest:      Chest wall: No tenderness.   Abdominal:      General: Abdomen is flat. Bowel sounds are normal.      Palpations: Abdomen is soft. There is no mass.      Tenderness: There is no abdominal tenderness. There is no guarding.   Musculoskeletal:         General: No swelling or signs of injury. Normal range of motion.      Cervical back: Normal range of motion and neck supple. No rigidity or tenderness.      Right lower leg: No edema.      Left lower leg: No edema.   Skin:     General: Skin is warm and dry.      Capillary Refill: Capillary refill takes less than 2 seconds.      Findings: No bruising, lesion or rash.   Neurological:      General: No focal deficit present.      Mental Status: She is alert and oriented to person, place, and time. Mental status is at baseline.      Motor: No weakness.      Coordination: Coordination normal.   Psychiatric:         Mood and Affect: Mood normal.         Behavior: Behavior normal.         Thought Content: Thought content normal.         Judgment: Judgment normal.         Assessment:       1. Upper respiratory tract infection, unspecified type    2. Acute non-recurrent pansinusitis    3. Otalgia of both ears           Plan:       Upper respiratory tract infection, unspecified type  -     POCT Influenza A/B Molecular  -     POCT COVID-19 Rapid Screening  -     Discontinue: methylPREDNISolone (MEDROL DOSEPACK) 4 mg tablet; use as directed  Dispense: 21 each; Refill: 0  -     Discontinue: cefdinir (OMNICEF) 300 MG capsule;  Take 1 capsule (300 mg total) by mouth 2 (two) times daily. for 10 days  Dispense: 20 capsule; Refill: 0  -     cefdinir (OMNICEF) 300 MG capsule; Take 1 capsule (300 mg total) by mouth 2 (two) times daily. for 10 days  Dispense: 20 capsule; Refill: 0  -     methylPREDNISolone (MEDROL DOSEPACK) 4 mg tablet; use as directed  Dispense: 21 each; Refill: 0    Acute non-recurrent pansinusitis  -     Discontinue: methylPREDNISolone (MEDROL DOSEPACK) 4 mg tablet; use as directed  Dispense: 21 each; Refill: 0  -     Discontinue: cefdinir (OMNICEF) 300 MG capsule; Take 1 capsule (300 mg total) by mouth 2 (two) times daily. for 10 days  Dispense: 20 capsule; Refill: 0  -     cefdinir (OMNICEF) 300 MG capsule; Take 1 capsule (300 mg total) by mouth 2 (two) times daily. for 10 days  Dispense: 20 capsule; Refill: 0  -     methylPREDNISolone (MEDROL DOSEPACK) 4 mg tablet; use as directed  Dispense: 21 each; Refill: 0    Otalgia of both ears  -     Discontinue: methylPREDNISolone (MEDROL DOSEPACK) 4 mg tablet; use as directed  Dispense: 21 each; Refill: 0  -     Discontinue: cefdinir (OMNICEF) 300 MG capsule; Take 1 capsule (300 mg total) by mouth 2 (two) times daily. for 10 days  Dispense: 20 capsule; Refill: 0  -     cefdinir (OMNICEF) 300 MG capsule; Take 1 capsule (300 mg total) by mouth 2 (two) times daily. for 10 days  Dispense: 20 capsule; Refill: 0  -     methylPREDNISolone (MEDROL DOSEPACK) 4 mg tablet; use as directed  Dispense: 21 each; Refill: 0      Follow up if symptoms worsen or fail to improve.        3/13/2023 Jacque Mancuso NP

## 2023-03-27 DIAGNOSIS — B37.2 YEAST DERMATITIS: Primary | ICD-10-CM

## 2023-03-27 RX ORDER — NYSTATIN AND TRIAMCINOLONE ACETONIDE 100000; 1 [USP'U]/G; MG/G
CREAM TOPICAL 2 TIMES DAILY
Qty: 60 G | Refills: 2 | Status: SHIPPED | OUTPATIENT
Start: 2023-03-27

## 2023-05-08 DIAGNOSIS — I10 HYPERTENSION, UNSPECIFIED TYPE: ICD-10-CM

## 2023-05-11 RX ORDER — TRIAMTERENE/HYDROCHLOROTHIAZID 37.5-25 MG
TABLET ORAL
Qty: 90 TABLET | Refills: 0 | Status: SHIPPED | OUTPATIENT
Start: 2023-05-11 | End: 2023-08-08

## 2023-06-29 DIAGNOSIS — I10 HYPERTENSION, UNSPECIFIED TYPE: ICD-10-CM

## 2023-07-06 RX ORDER — NEBIVOLOL 5 MG/1
TABLET ORAL
Qty: 90 TABLET | Refills: 0 | Status: SHIPPED | OUTPATIENT
Start: 2023-07-06 | End: 2023-08-14 | Stop reason: SDUPTHER

## 2023-07-06 RX ORDER — AMLODIPINE BESYLATE 2.5 MG/1
TABLET ORAL
Qty: 90 TABLET | Refills: 0 | Status: SHIPPED | OUTPATIENT
Start: 2023-07-06 | End: 2023-08-14 | Stop reason: SDUPTHER

## 2023-07-25 ENCOUNTER — OFFICE VISIT (OUTPATIENT)
Dept: ORTHOPEDICS | Facility: CLINIC | Age: 58
End: 2023-07-25
Payer: COMMERCIAL

## 2023-07-25 VITALS — BODY MASS INDEX: 31.24 KG/M2 | WEIGHT: 183 LBS | HEIGHT: 64 IN

## 2023-07-25 DIAGNOSIS — R22.41 ANKLE MASS, RIGHT: Primary | ICD-10-CM

## 2023-07-25 PROCEDURE — 3008F BODY MASS INDEX DOCD: CPT | Mod: CPTII,S$GLB,, | Performed by: ORTHOPAEDIC SURGERY

## 2023-07-25 PROCEDURE — 1160F RVW MEDS BY RX/DR IN RCRD: CPT | Mod: CPTII,S$GLB,, | Performed by: ORTHOPAEDIC SURGERY

## 2023-07-25 PROCEDURE — 99213 PR OFFICE/OUTPT VISIT, EST, LEVL III, 20-29 MIN: ICD-10-PCS | Mod: S$GLB,,, | Performed by: ORTHOPAEDIC SURGERY

## 2023-07-25 PROCEDURE — 1159F MED LIST DOCD IN RCRD: CPT | Mod: CPTII,S$GLB,, | Performed by: ORTHOPAEDIC SURGERY

## 2023-07-25 PROCEDURE — 99213 OFFICE O/P EST LOW 20 MIN: CPT | Mod: S$GLB,,, | Performed by: ORTHOPAEDIC SURGERY

## 2023-07-25 PROCEDURE — 1160F PR REVIEW ALL MEDS BY PRESCRIBER/CLIN PHARMACIST DOCUMENTED: ICD-10-PCS | Mod: CPTII,S$GLB,, | Performed by: ORTHOPAEDIC SURGERY

## 2023-07-25 PROCEDURE — 3008F PR BODY MASS INDEX (BMI) DOCUMENTED: ICD-10-PCS | Mod: CPTII,S$GLB,, | Performed by: ORTHOPAEDIC SURGERY

## 2023-07-25 PROCEDURE — 1159F PR MEDICATION LIST DOCUMENTED IN MEDICAL RECORD: ICD-10-PCS | Mod: CPTII,S$GLB,, | Performed by: ORTHOPAEDIC SURGERY

## 2023-07-25 NOTE — PROGRESS NOTES
[facility]    Subjective:     Chief Complaint:   Chief Complaint   Patient presents with    Right Ankle - Pain     Right ankle cyst that has been there for years. States that recently, the cyst has started to grow and become very painful.       Past Medical History:   Diagnosis Date    Anesthesia 2009    Pt reports severe drop in BP when anesthesia given.    Asthma     Diverticulitis     GERD (gastroesophageal reflux disease)     Hypertension     Neuropathy 2007    Stroke     RETINACULAR        Past Surgical History:   Procedure Laterality Date     SECTION  1990    CHOLECYSTECTOMY      HYSTERECTOMY      INSERTION OF URETERAL CATHETER N/A 10/16/2019    Procedure: INSERTION, CATHETER, URETER;  Surgeon: Willard Ruffin MD;  Location: [facility] OR;  Service: Urology;  Laterality: N/A;    LAPAROSCOPIC SIGMOIDECTOMY N/A 10/16/2019    Procedure: COLECTOMY, SIGMOID, LAPAROSCOPIC;  Surgeon: Doug Gifford MD;  Location: [facility] OR;  Service: General;  Laterality: N/A;    SHOULDER ARTHROSCOPY Bilateral     STOMACH SURGERY      Possible Nissen per pt- To treat acid reflux     TUBAL LIGATION  1990       Current Outpatient Medications   Medication Sig    amLODIPine (NORVASC) 2.5 MG tablet TAKE 1 TABLET DAILY    aspirin (ECOTRIN) 81 MG EC tablet Take 81 mg by mouth once daily.    cetirizine 10 mg chewable tablet Take 10 mg by mouth once daily.    cholecalciferol, vitamin D3, 125 mcg (5,000 unit) Tab 5,000 Units once daily.     cyanocobalamin, vitamin B-12, 5,000 mcg TbDL Take 5,000 Units by mouth once daily.     icosapent ethyL (VASCEPA) 1 gram Cap TAKE 2 CAPSULES (2 GRAMS) TWICE A DAY    ketorolac 0.4% (ACULAR) 0.4 % Drop Place 1 drop into both eyes 4 (four) times daily.    LUCENTIS 0.5 mg/0.05 mL Syrg injection     magnesium oxide 500 mg Cap Take 1 capsule by mouth once daily.    nebivoloL (BYSTOLIC) 5 MG Tab TAKE 1 TABLET DAILY    nystatin-triamcinolone (MYCOLOG II) cream Apply topically 2  (two) times daily.    progesterone (PROMETRIUM) 200 MG capsule Take 200 mg by mouth.    salmeteroL (SEREVENT) 50 mcg/dose diskus inhaler Inhale 1 puff into the lungs 2 (two) times daily. Controller    triamterene-hydrochlorothiazide 37.5-25 mg (MAXZIDE-25) 37.5-25 mg per tablet TAKE 1 TABLET DAILY    turmeric root extract 500 mg Cap Take 500 mg by mouth once daily.     albuterol (PROVENTIL/VENTOLIN HFA) 90 mcg/actuation inhaler Inhale 1-2 puffs into the lungs every 4 to 6 hours as needed for Wheezing or Shortness of Breath. Rescue     No current facility-administered medications for this visit.       Review of patient's allergies indicates:   Allergen Reactions    Adhesive Hives    Lisinopril Other (See Comments)     Chest pain    Meloxicam     Meperidine     Oxycodone-acetaminophen        Family History   Problem Relation Age of Onset    Coronary artery disease Mother     Hyperlipidemia Mother     Other Mother         Breast Nodules    Arthritis Mother     Hypertension Father     Atrial fibrillation Father     Neuropathy Father     Other Father         Bypass    Diabetes Father     Hypertension Sister     Cancer Sister         Breast Cacner    Neuropathy Sister     Breast cancer Sister     Hypertension Daughter     No Known Problems Son     Diabetes Maternal Grandmother     Hypertension Maternal Grandmother     No Known Problems Maternal Grandfather     Coronary artery disease Paternal Grandmother     Cancer Paternal Grandmother         Breast Cancer    Heart attack Paternal Grandmother     Breast cancer Paternal Grandmother     Dementia Paternal Grandfather     Heart attack Paternal Grandfather     No Known Problems Sister     Other Daughter         Benign Hypertension(Brain)    Breast cancer Maternal Aunt     Breast cancer Paternal Aunt        Social History     Socioeconomic History    Marital status:     Number of children: 3   Tobacco Use    Smoking status: Never    Smokeless tobacco: Never   Substance  and Sexual Activity    Alcohol use: Yes     Alcohol/week: 3.0 standard drinks     Types: 3 Glasses of wine per week     Comment: 3 glasses of wine per week     Drug use: Never     Social Determinants of Health     Financial Resource Strain: Unknown    Difficulty of Paying Living Expenses: Patient refused   Food Insecurity: Unknown    Worried About Running Out of Food in the Last Year: Patient refused    Ran Out of Food in the Last Year: Patient refused   Transportation Needs: Unknown    Lack of Transportation (Medical): Patient refused    Lack of Transportation (Non-Medical): Patient refused   Physical Activity: Sufficiently Active    Days of Exercise per Week: 4 days    Minutes of Exercise per Session: 50 min   Stress: No Stress Concern Present    Feeling of Stress : Only a little   Social Connections: Unknown    Frequency of Communication with Friends and Family: More than three times a week    Frequency of Social Gatherings with Friends and Family: Three times a week    Active Member of Clubs or Organizations: Yes    Attends Club or Organization Meetings: 1 to 4 times per year    Marital Status:    Housing Stability: Unknown    Unable to Pay for Housing in the Last Year: Patient refused    Number of Places Lived in the Last Year: 1    Unstable Housing in the Last Year: Patient refused       History of present illness:  Patient comes in today for the right ankle.  She has had a painful mass over her ankle for about 6 months.  It has gotten worse.  She is very interested in having it removed      Review of Systems:    Constitution: Negative for chills, fever, and sweats.  Negative for unexplained weight loss.    HENT:  Negative for headaches and blurry vision.    Cardiovascular:Negative for chest pain or irregular heart beat. Negative for hypertension.    Respiratory:  Negative for cough and shortness of breath.    Gastrointestinal: Negative for abdominal pain, heartburn, melena, nausea, and  vomitting.    Genitourinary:  Negative bladder incontinence and dysuria.    Musculoskeletal:  See HPI for details.     Neurological: Negative for numbness.    Psychiatric/Behavioral: Negative for depression.  The patient is not nervous/anxious.      Endocrine: Negative for polyuria    Hematologic/Lymphatic: Negative for bleeding problem.  Does not bruise/bleed easily.    Skin: Negative for poor would healing and rash    Objective:      Physical Examination:    Vital Signs:  There were no vitals filed for this visit.    Body mass index is 31.41 kg/m².    This a well-developed, well nourished patient in no acute distress.  They are alert and oriented and cooperative to examination.        Patient appears to be stated age.  She has a fairly firm mass over the lateral malleolus.  She is neurovascular intact to motor and sensory testing  Pertinent New Results:    XRAY Report / Interpretation:   Three views of the right ankle within normal limits no fractures or subluxations    Assessment/Plan:      Ankle mass.  I have not ordered an MRI to better look at the ankle mass.  I will see her back with that data      This note was created using Dragon voice recognition software that occasionally misinterpreted phrases or words.

## 2023-08-02 ENCOUNTER — TELEPHONE (OUTPATIENT)
Dept: ORTHOPEDICS | Facility: CLINIC | Age: 58
End: 2023-08-02

## 2023-08-02 NOTE — TELEPHONE ENCOUNTER
----- Message from Ana Roc sent at 8/2/2023  3:58 PM CDT -----  Regarding: Unable to Contact - ###  We have made several attempts to contact this patient to schedule their MRI Ankle Without Contrast Right and have been unable to reach them. We will be removing this order from our work queue but wanted to make you aware in case you wanted to reach out to the patient.     Thanks,   [facility] Centralized Scheduling

## 2023-08-03 ENCOUNTER — OFFICE VISIT (OUTPATIENT)
Dept: DERMATOLOGY | Facility: CLINIC | Age: 58
End: 2023-08-03
Payer: COMMERCIAL

## 2023-08-03 DIAGNOSIS — L29.9 LOCALIZED PRURITUS: Primary | ICD-10-CM

## 2023-08-03 DIAGNOSIS — L82.1 SEBORRHEIC KERATOSES: ICD-10-CM

## 2023-08-03 DIAGNOSIS — R20.2 NOTALGIA PARESTHETICA: ICD-10-CM

## 2023-08-03 DIAGNOSIS — D23.30 BENIGN NEOPLASM OF SKIN OF FACE: ICD-10-CM

## 2023-08-03 PROCEDURE — 1160F PR REVIEW ALL MEDS BY PRESCRIBER/CLIN PHARMACIST DOCUMENTED: ICD-10-PCS | Mod: CPTII,S$GLB,, | Performed by: STUDENT IN AN ORGANIZED HEALTH CARE EDUCATION/TRAINING PROGRAM

## 2023-08-03 PROCEDURE — 1159F PR MEDICATION LIST DOCUMENTED IN MEDICAL RECORD: ICD-10-PCS | Mod: CPTII,S$GLB,, | Performed by: STUDENT IN AN ORGANIZED HEALTH CARE EDUCATION/TRAINING PROGRAM

## 2023-08-03 PROCEDURE — 1159F MED LIST DOCD IN RCRD: CPT | Mod: CPTII,S$GLB,, | Performed by: STUDENT IN AN ORGANIZED HEALTH CARE EDUCATION/TRAINING PROGRAM

## 2023-08-03 PROCEDURE — 1160F RVW MEDS BY RX/DR IN RCRD: CPT | Mod: CPTII,S$GLB,, | Performed by: STUDENT IN AN ORGANIZED HEALTH CARE EDUCATION/TRAINING PROGRAM

## 2023-08-03 PROCEDURE — 99213 PR OFFICE/OUTPT VISIT, EST, LEVL III, 20-29 MIN: ICD-10-PCS | Mod: S$GLB,,, | Performed by: STUDENT IN AN ORGANIZED HEALTH CARE EDUCATION/TRAINING PROGRAM

## 2023-08-03 PROCEDURE — 99213 OFFICE O/P EST LOW 20 MIN: CPT | Mod: S$GLB,,, | Performed by: STUDENT IN AN ORGANIZED HEALTH CARE EDUCATION/TRAINING PROGRAM

## 2023-08-03 NOTE — PROGRESS NOTES
Subjective:      Patient ID:  Daniela Lawson is a 58 y.o. female who presents for   Chief Complaint   Patient presents with    Spot     Right cheek, back     LOV 3/3/20 flower    Patient here today for spot on right upper cheek x months. Patient states it was bigger, was bothersome.    Also complains of spots on back.     Derm HX:   PHX of Pre- CA treated with Cry by Dr. Ish Zepeda   Fhx of Skin CA:   Dad - MM   paternal uncle, MM  Mom - SCC         Review of Systems   Constitutional:  Negative for fever, chills and fatigue.   Respiratory:  Negative for cough and shortness of breath.    Gastrointestinal:  Negative for nausea and vomiting.   Skin:  Positive for activity-related sunscreen use and wears hat. Negative for daily sunscreen use.   Hematologic/Lymphatic: Bruises/bleeds easily.       Objective:   Physical Exam   Constitutional: She appears well-developed and well-nourished.   Neurological: She is alert and oriented to person, place, and time.   Psychiatric: She has a normal mood and affect.   Skin:   Areas Examined (abnormalities noted in diagram):   Head / Face Inspection Performed  Back Inspection Performed                 Diagram Legend     Erythematous scaling macule/papule c/w actinic keratosis       Vascular papule c/w angioma      Pigmented verrucoid papule/plaque c/w seborrheic keratosis      Yellow umbilicated papule c/w sebaceous hyperplasia      Irregularly shaped tan macule c/w lentigo     1-2 mm smooth white papules consistent with Milia      Movable subcutaneous cyst with punctum c/w epidermal inclusion cyst      Subcutaneous movable cyst c/w pilar cyst      Firm pink to brown papule c/w dermatofibroma      Pedunculated fleshy papule(s) c/w skin tag(s)      Evenly pigmented macule c/w junctional nevus     Mildly variegated pigmented, slightly irregular-bordered macule c/w mildly atypical nevus      Flesh colored to evenly pigmented papule c/w intradermal nevus       Pink pearly  papule/plaque c/w basal cell carcinoma      Erythematous hyperkeratotic cursted plaque c/w SCC      Surgical scar with no sign of skin cancer recurrence      Open and closed comedones      Inflammatory papules and pustules      Verrucoid papule consistent consistent with wart     Erythematous eczematous patches and plaques     Dystrophic onycholytic nail with subungual debris c/w onychomycosis     Umbilicated papule    Erythematous-base heme-crusted tan verrucoid plaque consistent with inflamed seborrheic keratosis     Erythematous Silvery Scaling Plaque c/w Psoriasis     See annotation      Assessment / Plan:        Localized pruritus  Notalgia paresthetica  - central back  - no rash present  - hx of lumbar spine issues  - Pramoxine cream - cerave anti-itch   Sarna original with camphor- menthol- put in the refridgerator   Or sarna sensitive w/ pramoxine    Benign neoplasm of skin of face  - right cheek  - resolving SK vs. Lentigo   - 3mm   - offered cryo- she states it is resolving  - she should rtc if growing or changing    Seborrheic keratosis  These are benign inherited growths without a malignant potential. Reassurance given to patient. No treatment is necessary.            No follow-ups on file.

## 2023-08-03 NOTE — PATIENT INSTRUCTIONS
Pramoxine cream - cerave anti-itch     Sarna original with camphor- menthol- put in the refridgerator   Or sarna sensitive w/ pramoxine

## 2023-08-07 DIAGNOSIS — I10 HYPERTENSION, UNSPECIFIED TYPE: ICD-10-CM

## 2023-08-08 RX ORDER — TRIAMTERENE/HYDROCHLOROTHIAZID 37.5-25 MG
TABLET ORAL
Qty: 90 TABLET | Refills: 0 | Status: SHIPPED | OUTPATIENT
Start: 2023-08-08 | End: 2023-11-08

## 2023-08-14 ENCOUNTER — OFFICE VISIT (OUTPATIENT)
Dept: FAMILY MEDICINE | Facility: CLINIC | Age: 58
End: 2023-08-14
Payer: COMMERCIAL

## 2023-08-14 ENCOUNTER — HOSPITAL ENCOUNTER (OUTPATIENT)
Dept: RADIOLOGY | Facility: HOSPITAL | Age: 58
Discharge: HOME | End: 2023-08-14
Attending: NURSE PRACTITIONER
Payer: COMMERCIAL

## 2023-08-14 VITALS
SYSTOLIC BLOOD PRESSURE: 124 MMHG | OXYGEN SATURATION: 98 % | BODY MASS INDEX: 30.45 KG/M2 | HEART RATE: 71 BPM | HEIGHT: 64 IN | WEIGHT: 178.38 LBS | DIASTOLIC BLOOD PRESSURE: 82 MMHG

## 2023-08-14 DIAGNOSIS — R10.31 RIGHT LOWER QUADRANT PAIN: Primary | ICD-10-CM

## 2023-08-14 DIAGNOSIS — R10.33 UMBILICAL PAIN: ICD-10-CM

## 2023-08-14 DIAGNOSIS — E78.1 PURE HYPERTRIGLYCERIDEMIA: ICD-10-CM

## 2023-08-14 DIAGNOSIS — E66.09 CLASS 1 OBESITY DUE TO EXCESS CALORIES WITHOUT SERIOUS COMORBIDITY WITH BODY MASS INDEX (BMI) OF 30.0 TO 30.9 IN ADULT: ICD-10-CM

## 2023-08-14 DIAGNOSIS — Z12.31 ENCOUNTER FOR SCREENING MAMMOGRAM FOR MALIGNANT NEOPLASM OF BREAST: ICD-10-CM

## 2023-08-14 DIAGNOSIS — I10 HYPERTENSION, UNSPECIFIED TYPE: ICD-10-CM

## 2023-08-14 DIAGNOSIS — R10.31 RIGHT LOWER QUADRANT PAIN: ICD-10-CM

## 2023-08-14 DIAGNOSIS — E66.09 CLASS 1 OBESITY DUE TO EXCESS CALORIES WITHOUT SERIOUS COMORBIDITY WITH BODY MASS INDEX (BMI) OF 30.0 TO 30.9 IN ADULT: Primary | ICD-10-CM

## 2023-08-14 PROBLEM — E66.811 CLASS 1 OBESITY DUE TO EXCESS CALORIES WITHOUT SERIOUS COMORBIDITY WITH BODY MASS INDEX (BMI) OF 30.0 TO 30.9 IN ADULT: Status: ACTIVE | Noted: 2023-08-14

## 2023-08-14 PROCEDURE — 3074F PR MOST RECENT SYSTOLIC BLOOD PRESSURE < 130 MM HG: ICD-10-PCS | Mod: CPTII,S$GLB,, | Performed by: NURSE PRACTITIONER

## 2023-08-14 PROCEDURE — 74176 CT ABD & PELVIS W/O CONTRAST: CPT | Mod: TC

## 2023-08-14 PROCEDURE — 3079F DIAST BP 80-89 MM HG: CPT | Mod: CPTII,S$GLB,, | Performed by: NURSE PRACTITIONER

## 2023-08-14 PROCEDURE — 1160F PR REVIEW ALL MEDS BY PRESCRIBER/CLIN PHARMACIST DOCUMENTED: ICD-10-PCS | Mod: CPTII,S$GLB,, | Performed by: NURSE PRACTITIONER

## 2023-08-14 PROCEDURE — 3074F SYST BP LT 130 MM HG: CPT | Mod: CPTII,S$GLB,, | Performed by: NURSE PRACTITIONER

## 2023-08-14 PROCEDURE — 1159F MED LIST DOCD IN RCRD: CPT | Mod: CPTII,S$GLB,, | Performed by: NURSE PRACTITIONER

## 2023-08-14 PROCEDURE — 3008F PR BODY MASS INDEX (BMI) DOCUMENTED: ICD-10-PCS | Mod: CPTII,S$GLB,, | Performed by: NURSE PRACTITIONER

## 2023-08-14 PROCEDURE — 1159F PR MEDICATION LIST DOCUMENTED IN MEDICAL RECORD: ICD-10-PCS | Mod: CPTII,S$GLB,, | Performed by: NURSE PRACTITIONER

## 2023-08-14 PROCEDURE — 99214 OFFICE O/P EST MOD 30 MIN: CPT | Mod: S$GLB,,, | Performed by: NURSE PRACTITIONER

## 2023-08-14 PROCEDURE — 3008F BODY MASS INDEX DOCD: CPT | Mod: CPTII,S$GLB,, | Performed by: NURSE PRACTITIONER

## 2023-08-14 PROCEDURE — 99214 PR OFFICE/OUTPT VISIT, EST, LEVL IV, 30-39 MIN: ICD-10-PCS | Mod: S$GLB,,, | Performed by: NURSE PRACTITIONER

## 2023-08-14 PROCEDURE — 1160F RVW MEDS BY RX/DR IN RCRD: CPT | Mod: CPTII,S$GLB,, | Performed by: NURSE PRACTITIONER

## 2023-08-14 PROCEDURE — 3079F PR MOST RECENT DIASTOLIC BLOOD PRESSURE 80-89 MM HG: ICD-10-PCS | Mod: CPTII,S$GLB,, | Performed by: NURSE PRACTITIONER

## 2023-08-14 RX ORDER — ICOSAPENT ETHYL 1000 MG/1
CAPSULE ORAL
Qty: 360 CAPSULE | Refills: 1 | Status: SHIPPED | OUTPATIENT
Start: 2023-08-14

## 2023-08-14 RX ORDER — AMLODIPINE BESYLATE 2.5 MG/1
2.5 TABLET ORAL DAILY
Qty: 90 TABLET | Refills: 0 | Status: SHIPPED | OUTPATIENT
Start: 2023-08-14 | End: 2023-11-15 | Stop reason: SDUPTHER

## 2023-08-14 RX ORDER — NEBIVOLOL 5 MG/1
5 TABLET ORAL DAILY
Qty: 90 TABLET | Refills: 0 | Status: SHIPPED | OUTPATIENT
Start: 2023-08-14 | End: 2023-11-15 | Stop reason: SDUPTHER

## 2023-08-14 RX ORDER — PHENTERMINE HYDROCHLORIDE 37.5 MG/1
37.5 TABLET ORAL DAILY
Qty: 30 TABLET | Refills: 0 | Status: SHIPPED | OUTPATIENT
Start: 2023-08-14 | End: 2023-10-04 | Stop reason: SDUPTHER

## 2023-08-14 NOTE — PROGRESS NOTES
SUBJECTIVE:      Patient ID: Daniela Lawson is a 58 y.o. female.    Chief Complaint: Naval Pain (Pain in the naval area - right side - pain scale 5/10 - still has appendix) and Weight Loss (Struggling with weight loss - needing to loose 40lbs per Dr. Jones - would like to try phentermine if possible)    Presents for problem visit    Hypertension  This is a chronic problem. The current episode started more than 1 year ago. The problem has been gradually improving since onset. The problem is controlled. Associated symptoms include peripheral edema. Pertinent negatives include no blurred vision, chest pain, headaches, neck pain, palpitations or shortness of breath. Risk factors for coronary artery disease include family history, obesity, post-menopausal state and sedentary lifestyle. Past treatments include beta blockers, diuretics, lifestyle changes and calcium channel blockers. The current treatment provides moderate improvement. Compliance problems include diet and exercise.    Abdominal Pain  This is a new problem. The current episode started in the past 7 days. The onset quality is sudden. The problem occurs constantly. The problem has been rapidly worsening. The pain is located in the RLQ. The pain is at a severity of 5/10. The pain is moderate. The quality of the pain is sharp, cramping, colicky and aching. The abdominal pain radiates to the periumbilical region. Associated symptoms include arthralgias and myalgias. Pertinent negatives include no constipation, diarrhea, dysuria, frequency, headaches, hematuria, nausea or vomiting. The pain is aggravated by certain positions and movement. The pain is relieved by Being still and recumbency. She has tried nothing for the symptoms. Prior diagnostic workup includes CT scan. Her past medical history is significant for GERD.       Past Surgical History:   Procedure Laterality Date     SECTION  1990    CHOLECYSTECTOMY      HYSTERECTOMY       INSERTION OF URETERAL CATHETER N/A 10/16/2019    Procedure: INSERTION, CATHETER, URETER;  Surgeon: Willard Ruffin MD;  Location: [facility] OR;  Service: Urology;  Laterality: N/A;    LAPAROSCOPIC SIGMOIDECTOMY N/A 10/16/2019    Procedure: COLECTOMY, SIGMOID, LAPAROSCOPIC;  Surgeon: Doug Gifford MD;  Location: [facility] OR;  Service: General;  Laterality: N/A;    SHOULDER ARTHROSCOPY Bilateral     STOMACH SURGERY      Possible Nissen per pt- To treat acid reflux     TUBAL LIGATION  07/27/1990     Family History   Problem Relation Age of Onset    Coronary artery disease Mother     Hyperlipidemia Mother     Other Mother         Breast Nodules    Arthritis Mother     Hypertension Father     Atrial fibrillation Father     Neuropathy Father     Other Father         Bypass    Diabetes Father     Hypertension Sister     Cancer Sister         Breast Cacner    Neuropathy Sister     Breast cancer Sister     Hypertension Daughter     No Known Problems Son     Diabetes Maternal Grandmother     Hypertension Maternal Grandmother     No Known Problems Maternal Grandfather     Coronary artery disease Paternal Grandmother     Cancer Paternal Grandmother         Breast Cancer    Heart attack Paternal Grandmother     Breast cancer Paternal Grandmother     Dementia Paternal Grandfather     Heart attack Paternal Grandfather     No Known Problems Sister     Other Daughter         Benign Hypertension(Brain)    Breast cancer Maternal Aunt     Breast cancer Paternal Aunt       Social History     Socioeconomic History    Marital status:     Number of children: 3   Tobacco Use    Smoking status: Never    Smokeless tobacco: Never   Substance and Sexual Activity    Alcohol use: Yes     Alcohol/week: 3.0 standard drinks of alcohol     Types: 3 Glasses of wine per week     Comment: 3 glasses of wine per week     Drug use: Never     Social Determinants of Health     Financial Resource Strain: Low Risk  (8/11/2023)    Overall Financial  Resource Strain (CARDIA)     Difficulty of Paying Living Expenses: Not very hard   Food Insecurity: No Food Insecurity (8/11/2023)    Hunger Vital Sign     Worried About Running Out of Food in the Last Year: Never true     Ran Out of Food in the Last Year: Never true   Transportation Needs: No Transportation Needs (8/11/2023)    PRAPARE - Transportation     Lack of Transportation (Medical): No     Lack of Transportation (Non-Medical): No   Physical Activity: Sufficiently Active (8/11/2023)    Exercise Vital Sign     Days of Exercise per Week: 5 days     Minutes of Exercise per Session: 50 min   Stress: No Stress Concern Present (8/11/2023)    [employer] - Occupational Stress Questionnaire     Feeling of Stress : Only a little   Social Connections: Unknown (8/11/2023)    Social Connection and Isolation Panel [NHANES]     Frequency of Communication with Friends and Family: More than three times a week     Frequency of Social Gatherings with Friends and Family: Once a week     Active Member of Clubs or Organizations: Yes     Attends Club or Organization Meetings: More than 4 times per year     Marital Status:    Housing Stability: Low Risk  (8/11/2023)    Housing Stability Vital Sign     Unable to Pay for Housing in the Last Year: No     Number of Places Lived in the Last Year: 1     Unstable Housing in the Last Year: No     Current Outpatient Medications   Medication Sig Dispense Refill    aspirin (ECOTRIN) 81 MG EC tablet Take 81 mg by mouth once daily.      cetirizine 10 mg chewable tablet Take 10 mg by mouth once daily.      cholecalciferol, vitamin D3, 125 mcg (5,000 unit) Tab 5,000 Units once daily.       cyanocobalamin, vitamin B-12, 5,000 mcg TbDL Take 5,000 Units by mouth once daily.       ketorolac 0.4% (ACULAR) 0.4 % Drop Place 1 drop into both eyes 4 (four) times daily.      LUCENTIS 0.5 mg/0.05 mL Syrg injection       magnesium oxide 500 mg Cap Take 1 capsule by mouth  once daily.      nystatin-triamcinolone (MYCOLOG II) cream Apply topically 2 (two) times daily. 60 g 2    progesterone (PROMETRIUM) 200 MG capsule Take 200 mg by mouth.      salmeteroL (SEREVENT) 50 mcg/dose diskus inhaler Inhale 1 puff into the lungs 2 (two) times daily. Controller 3 each 3    triamterene-hydrochlorothiazide 37.5-25 mg (MAXZIDE-25) 37.5-25 mg per tablet TAKE 1 TABLET DAILY 90 tablet 0    turmeric root extract 500 mg Cap Take 500 mg by mouth once daily.       albuterol (PROVENTIL/VENTOLIN HFA) 90 mcg/actuation inhaler Inhale 1-2 puffs into the lungs every 4 to 6 hours as needed for Wheezing or Shortness of Breath. Rescue 18 g 3    amLODIPine (NORVASC) 2.5 MG tablet Take 1 tablet (2.5 mg total) by mouth once daily. 90 tablet 0    icosapent ethyL (VASCEPA) 1 gram Cap TAKE 2 CAPSULES (2 GRAMS) TWICE A  capsule 1    nebivoloL (BYSTOLIC) 5 MG Tab Take 1 tablet (5 mg total) by mouth once daily. 90 tablet 0    phentermine (ADIPEX-P) 37.5 mg tablet Take 1 tablet (37.5 mg total) by mouth once daily. 30 tablet 0     No current facility-administered medications for this visit.     Review of patient's allergies indicates:   Allergen Reactions    Adhesive Hives    Lisinopril Other (See Comments)     Chest pain    Meloxicam     Meperidine     Oxycodone-acetaminophen       Past Medical History:   Diagnosis Date    Anesthesia     Pt reports severe drop in BP when anesthesia given.    Asthma     Diverticulitis     GERD (gastroesophageal reflux disease)     Hypertension     Neuropathy     Stroke     RETINACULAR      Past Surgical History:   Procedure Laterality Date     SECTION  1990    CHOLECYSTECTOMY      HYSTERECTOMY      INSERTION OF URETERAL CATHETER N/A 10/16/2019    Procedure: INSERTION, CATHETER, URETER;  Surgeon: Willard Ruffin MD;  Location: [facility];  Service: Urology;  Laterality: N/A;    LAPAROSCOPIC SIGMOIDECTOMY N/A 10/16/2019    Procedure: COLECTOMY, SIGMOID,  "LAPAROSCOPIC;  Surgeon: Doug Gifford MD;  Location: [facility];  Service: General;  Laterality: N/A;    SHOULDER ARTHROSCOPY Bilateral     STOMACH SURGERY      Possible Nissen per pt- To treat acid reflux     TUBAL LIGATION  07/27/1990       Review of Systems   Constitutional:  Negative for activity change, appetite change, fatigue and unexpected weight change.   HENT:  Negative for congestion, ear pain, hearing loss, postnasal drip, rhinorrhea, sinus pressure, sinus pain, sneezing, sore throat and trouble swallowing.    Eyes:  Negative for blurred vision, photophobia, pain, discharge and visual disturbance.   Respiratory:  Negative for cough, chest tightness, shortness of breath and wheezing.    Cardiovascular:  Negative for chest pain, palpitations and leg swelling.   Gastrointestinal:  Positive for abdominal pain. Negative for abdominal distention, blood in stool, constipation, diarrhea, nausea and vomiting.   Endocrine: Negative for cold intolerance, heat intolerance, polydipsia and polyuria.   Genitourinary:  Negative for difficulty urinating, dysuria, flank pain, frequency, hematuria, menstrual problem, pelvic pain and urgency.   Musculoskeletal:  Positive for arthralgias, joint swelling and myalgias. Negative for back pain and neck pain.   Skin:  Negative for pallor.   Allergic/Immunologic: Negative for environmental allergies and food allergies.   Neurological:  Negative for dizziness, weakness, light-headedness, numbness and headaches.   Hematological:  Does not bruise/bleed easily.   Psychiatric/Behavioral:  Negative for agitation, confusion, decreased concentration, dysphoric mood and sleep disturbance. The patient is not nervous/anxious.       OBJECTIVE:      Vitals:    08/14/23 1403   BP: 124/82   BP Location: Right arm   Patient Position: Sitting   BP Method: Large (Manual)   Pulse: 71   SpO2: 98%   Weight: 80.9 kg (178 lb 6.4 oz)   Height: 5' 4" (1.626 m)     Physical Exam  Vitals and nursing note " reviewed.   Constitutional:       General: She is in acute distress.      Appearance: Normal appearance. She is well-developed and well-groomed. She is obese.   HENT:      Head: Normocephalic and atraumatic.      Right Ear: Hearing normal.      Left Ear: Hearing normal.      Nose: Nose normal. No rhinorrhea.   Eyes:      General: Lids are normal.         Right eye: No discharge.         Left eye: No discharge.      Conjunctiva/sclera: Conjunctivae normal.      Right eye: Right conjunctiva is not injected.      Left eye: Left conjunctiva is not injected.      Pupils: Pupils are equal, round, and reactive to light. Pupils are equal.      Right eye: Pupil is round and reactive.      Left eye: Pupil is round and reactive.   Neck:      Thyroid: No thyromegaly.      Vascular: No JVD.      Trachea: Trachea normal. No tracheal deviation.   Cardiovascular:      Rate and Rhythm: Normal rate and regular rhythm.      Pulses:           Radial pulses are 2+ on the right side and 2+ on the left side.        Dorsalis pedis pulses are 2+ on the right side and 2+ on the left side.        Posterior tibial pulses are 2+ on the right side and 2+ on the left side.      Heart sounds: Normal heart sounds. No murmur heard.     No friction rub. No gallop.   Pulmonary:      Effort: Pulmonary effort is normal. No respiratory distress.      Breath sounds: Normal breath sounds. No stridor. No decreased breath sounds, wheezing, rhonchi or rales.   Abdominal:      General: Bowel sounds are normal. There is no distension.      Palpations: Abdomen is soft. Abdomen is not rigid.      Tenderness: There is abdominal tenderness in the right lower quadrant and periumbilical area. There is no guarding.   Musculoskeletal:         General: Normal range of motion.      Cervical back: Normal range of motion and neck supple.   Lymphadenopathy:      Cervical: No cervical adenopathy.   Skin:     General: Skin is warm and dry.      Capillary Refill: Capillary  refill takes less than 2 seconds.      Coloration: Skin is not pale.      Findings: No lesion or rash.   Neurological:      Mental Status: She is alert and oriented to person, place, and time.      GCS: GCS eye subscore is 4. GCS verbal subscore is 5. GCS motor subscore is 6.      Cranial Nerves: Cranial nerves 2-12 are intact.      Sensory: Sensation is intact.      Motor: Motor function is intact. No atrophy.      Coordination: Coordination is intact. Coordination normal.      Gait: Gait is intact. Gait normal.   Psychiatric:         Attention and Perception: Attention and perception normal. She is attentive.         Mood and Affect: Mood and affect normal.         Speech: Speech normal.         Behavior: Behavior normal.         Thought Content: Thought content normal.         Cognition and Memory: Cognition and memory normal.         Judgment: Judgment normal.        Assessment:       1. Right lower quadrant pain    2. Umbilical pain    3. Hypertension, unspecified type    4. Pure hypertriglyceridemia    5. Class 1 obesity due to excess calories without serious comorbidity with body mass index (BMI) of 30.0 to 30.9 in adult    6. Encounter for screening mammogram for malignant neoplasm of breast        Plan:       Right lower quadrant pain  -     CT Abdomen Pelvis  Without Contrast; Future; Expected date: 08/14/2023  -     CBC Auto Differential; Future; Expected date: 08/14/2023  -     Comprehensive Metabolic Panel; Future; Expected date: 08/14/2023    Umbilical pain  -     CT Abdomen Pelvis  Without Contrast; Future; Expected date: 08/14/2023  -     CBC Auto Differential; Future; Expected date: 08/14/2023  -     Comprehensive Metabolic Panel; Future; Expected date: 08/14/2023    Hypertension, unspecified type  -     amLODIPine (NORVASC) 2.5 MG tablet; Take 1 tablet (2.5 mg total) by mouth once daily.  Dispense: 90 tablet; Refill: 0  -     nebivoloL (BYSTOLIC) 5 MG Tab; Take 1 tablet (5 mg total) by mouth once  daily.  Dispense: 90 tablet; Refill: 0    Pure hypertriglyceridemia  -     icosapent ethyL (VASCEPA) 1 gram Cap; TAKE 2 CAPSULES (2 GRAMS) TWICE A DAY  Dispense: 360 capsule; Refill: 1    Class 1 obesity due to excess calories without serious comorbidity with body mass index (BMI) of 30.0 to 30.9 in adult        - phentermine fill requested through Dr. Arce d/t scope of practice limitations        - The patient is asked to make an attempt to improve diet and exercise patterns to aid in medical management of this problem.    Encounter for screening mammogram for malignant neoplasm of breast  -     Mammo Digital Screening Bilat w/ Ziggy; Future; Expected date: 08/14/2023            Follow up if symptoms worsen or fail to improve.      8/14/2023 CHITO Shea, FNP-C

## 2023-08-17 ENCOUNTER — HOSPITAL ENCOUNTER (OUTPATIENT)
Dept: RADIOLOGY | Facility: HOSPITAL | Age: 58
Discharge: HOME | End: 2023-08-17
Attending: ORTHOPAEDIC SURGERY
Payer: COMMERCIAL

## 2023-08-17 DIAGNOSIS — R22.41 ANKLE MASS, RIGHT: ICD-10-CM

## 2023-08-17 PROCEDURE — 73721 MRI JNT OF LWR EXTRE W/O DYE: CPT | Mod: TC,PO,RT

## 2023-09-25 ENCOUNTER — PATIENT MESSAGE (OUTPATIENT)
Dept: FAMILY MEDICINE | Facility: CLINIC | Age: 58
End: 2023-09-25

## 2023-09-25 DIAGNOSIS — E66.09 CLASS 1 OBESITY DUE TO EXCESS CALORIES WITHOUT SERIOUS COMORBIDITY WITH BODY MASS INDEX (BMI) OF 30.0 TO 30.9 IN ADULT: ICD-10-CM

## 2023-09-25 RX ORDER — PHENTERMINE HYDROCHLORIDE 37.5 MG/1
37.5 TABLET ORAL DAILY
Qty: 30 TABLET | Refills: 0 | Status: CANCELLED | OUTPATIENT
Start: 2023-09-25

## 2023-10-04 DIAGNOSIS — E66.09 CLASS 1 OBESITY DUE TO EXCESS CALORIES WITHOUT SERIOUS COMORBIDITY WITH BODY MASS INDEX (BMI) OF 30.0 TO 30.9 IN ADULT: ICD-10-CM

## 2023-10-04 RX ORDER — PHENTERMINE HYDROCHLORIDE 37.5 MG/1
37.5 TABLET ORAL DAILY
Qty: 90 TABLET | Refills: 0 | Status: SHIPPED | OUTPATIENT
Start: 2023-10-04

## 2023-10-17 ENCOUNTER — OFFICE VISIT (OUTPATIENT)
Dept: SURGERY | Facility: CLINIC | Age: 58
End: 2023-10-17
Payer: COMMERCIAL

## 2023-10-17 VITALS
HEART RATE: 64 BPM | DIASTOLIC BLOOD PRESSURE: 81 MMHG | HEIGHT: 64 IN | WEIGHT: 171.75 LBS | TEMPERATURE: 97 F | SYSTOLIC BLOOD PRESSURE: 126 MMHG | BODY MASS INDEX: 29.32 KG/M2

## 2023-10-17 DIAGNOSIS — K43.2 INCISIONAL HERNIA, WITHOUT OBSTRUCTION OR GANGRENE: Primary | ICD-10-CM

## 2023-10-17 DIAGNOSIS — K43.2 INCISIONAL HERNIA: ICD-10-CM

## 2023-10-17 PROCEDURE — 99204 PR OFFICE/OUTPT VISIT, NEW, LEVL IV, 45-59 MIN: ICD-10-PCS | Mod: S$GLB,,, | Performed by: SURGERY

## 2023-10-17 PROCEDURE — 99999 PR PBB SHADOW E&M-EST. PATIENT-LVL IV: CPT | Mod: PBBFAC,,, | Performed by: SURGERY

## 2023-10-17 PROCEDURE — 1160F PR REVIEW ALL MEDS BY PRESCRIBER/CLIN PHARMACIST DOCUMENTED: ICD-10-PCS | Mod: CPTII,S$GLB,, | Performed by: SURGERY

## 2023-10-17 PROCEDURE — 3079F DIAST BP 80-89 MM HG: CPT | Mod: CPTII,S$GLB,, | Performed by: SURGERY

## 2023-10-17 PROCEDURE — 3008F BODY MASS INDEX DOCD: CPT | Mod: CPTII,S$GLB,, | Performed by: SURGERY

## 2023-10-17 PROCEDURE — 3079F PR MOST RECENT DIASTOLIC BLOOD PRESSURE 80-89 MM HG: ICD-10-PCS | Mod: CPTII,S$GLB,, | Performed by: SURGERY

## 2023-10-17 PROCEDURE — 3074F SYST BP LT 130 MM HG: CPT | Mod: CPTII,S$GLB,, | Performed by: SURGERY

## 2023-10-17 PROCEDURE — 1159F PR MEDICATION LIST DOCUMENTED IN MEDICAL RECORD: ICD-10-PCS | Mod: CPTII,S$GLB,, | Performed by: SURGERY

## 2023-10-17 PROCEDURE — 1159F MED LIST DOCD IN RCRD: CPT | Mod: CPTII,S$GLB,, | Performed by: SURGERY

## 2023-10-17 PROCEDURE — 99204 OFFICE O/P NEW MOD 45 MIN: CPT | Mod: S$GLB,,, | Performed by: SURGERY

## 2023-10-17 PROCEDURE — 99999 PR PBB SHADOW E&M-EST. PATIENT-LVL IV: ICD-10-PCS | Mod: PBBFAC,,, | Performed by: SURGERY

## 2023-10-17 PROCEDURE — 3074F PR MOST RECENT SYSTOLIC BLOOD PRESSURE < 130 MM HG: ICD-10-PCS | Mod: CPTII,S$GLB,, | Performed by: SURGERY

## 2023-10-17 PROCEDURE — 3008F PR BODY MASS INDEX (BMI) DOCUMENTED: ICD-10-PCS | Mod: CPTII,S$GLB,, | Performed by: SURGERY

## 2023-10-17 PROCEDURE — 1160F RVW MEDS BY RX/DR IN RCRD: CPT | Mod: CPTII,S$GLB,, | Performed by: SURGERY

## 2023-10-17 RX ORDER — SODIUM CHLORIDE 9 MG/ML
INJECTION, SOLUTION INTRAVENOUS CONTINUOUS
Status: CANCELLED | OUTPATIENT
Start: 2023-11-16

## 2023-10-17 NOTE — PATIENT INSTRUCTIONS
Your procedure has been scheduled at:  [facility]pre-admit nurse  [phone]  [facility].........................................................pre_admit  [phone]  [facility]  [address]    DAY   Thursday    DATE   November 16, 2023       Someone from the hospital will call you the evening before surgery ( it may be after 5 pm) to let you know what time you need to be at the hospital for surgery.                                               1:  DO NOT EAT OR DRINK ANYTHING AFTER MIDNIGHT THE NIGHT BEFORE SURGERY.     2:  You will need to stop all blood thinners 7 days prior to surgery except Eliquis which is 48 hours.      3:  Pre-admit nurse will go over your medicines and let you know which ones not to take the morning of surgery    4:  Plan to have someone drive you home after you are released from the hospital.  You WILL NOT be able to drive yourself.    5:  If you have any questions or need to change your surgery date, please call Allyssa or Paolo  at [phone] or [phone]        AFTER SURGERY:    **You can shower 24-48 hours after surgery, incision can get wet but do not soak. You  may have bandages and steri strips or you may just have glue over the incision with no bandage.  The glue will peel away after a few days, steri strips you can remove after 1 week.   **After surgery you will get pain medication to take every 6 hours.  If you are not getting relief you can take the pain medicine every 4 hours and you can also take 2 tabs  if needed.  Also if you are able to take Ibuprofen you can take 600 mg every 6 hours.  Applying an ice pack for 15-20 minutes 3-4 times a day will be helpful.  **If you have not had a bowel movement within 3 days after surgery you may take a laxative of your choice.  ** Do not lift anything over 5-10 pounds.    You need to have a follow up appointment 2 weeks after surgery, call the office to  schedule or if you have questions or concerns.

## 2023-11-01 ENCOUNTER — PATIENT MESSAGE (OUTPATIENT)
Dept: FAMILY MEDICINE | Facility: CLINIC | Age: 58
End: 2023-11-01

## 2023-11-01 ENCOUNTER — HOSPITAL ENCOUNTER (OUTPATIENT)
Dept: RADIOLOGY | Facility: HOSPITAL | Age: 58
Discharge: HOME | End: 2023-11-01
Attending: NURSE PRACTITIONER
Payer: COMMERCIAL

## 2023-11-01 VITALS — HEIGHT: 64 IN | WEIGHT: 171.75 LBS | BODY MASS INDEX: 29.32 KG/M2

## 2023-11-01 DIAGNOSIS — Z12.31 ENCOUNTER FOR SCREENING MAMMOGRAM FOR MALIGNANT NEOPLASM OF BREAST: ICD-10-CM

## 2023-11-01 PROCEDURE — 77067 SCR MAMMO BI INCL CAD: CPT | Mod: TC,PO

## 2023-11-01 NOTE — PROGRESS NOTES
Subjective:       Patient ID: Daniela Lawson is a 58 y.o. female.    Chief Complaint: Hernia      HPI 58-year-old female presents with a diagnosis of a ventral hernia.  She does have some pain with the hernia.  She has a history of nausea but there has been no vomiting.  She denies any fever or chills.  The hernia protruded recently and she had pain for a whole day.  She is stable at this time.  It is nontender.    Past Medical History:   Diagnosis Date    Anesthesia     Pt reports severe drop in BP when anesthesia given.    Asthma     Diverticulitis     GERD (gastroesophageal reflux disease)     Hypertension     Neuropathy     Stroke     RETINACULAR      Past Surgical History:   Procedure Laterality Date     SECTION  1990    CHOLECYSTECTOMY      HYSTERECTOMY      INSERTION OF URETERAL CATHETER N/A 10/16/2019    Procedure: INSERTION, CATHETER, URETER;  Surgeon: Willard Ruffin MD;  Location: [facility];  Service: Urology;  Laterality: N/A;    LAPAROSCOPIC SIGMOIDECTOMY N/A 10/16/2019    Procedure: COLECTOMY, SIGMOID, LAPAROSCOPIC;  Surgeon: Doug Gifford MD;  Location: [facility];  Service: General;  Laterality: N/A;    SHOULDER ARTHROSCOPY Bilateral     STOMACH SURGERY      Possible Nissen per pt- To treat acid reflux     TUBAL LIGATION  1990         Current Outpatient Medications:     amLODIPine (NORVASC) 2.5 MG tablet, Take 1 tablet (2.5 mg total) by mouth once daily., Disp: 90 tablet, Rfl: 0    aspirin (ECOTRIN) 81 MG EC tablet, Take 81 mg by mouth once daily., Disp: , Rfl:     cetirizine 10 mg chewable tablet, Take 10 mg by mouth once daily., Disp: , Rfl:     cholecalciferol, vitamin D3, 125 mcg (5,000 unit) Tab, 5,000 Units once daily. , Disp: , Rfl:     cyanocobalamin, vitamin B-12, 5,000 mcg TbDL, Take 5,000 Units by mouth once daily. , Disp: , Rfl:     icosapent ethyL (VASCEPA) 1 gram Cap, TAKE 2 CAPSULES (2 GRAMS) TWICE A DAY, Disp: 360 capsule, Rfl: 1    ketorolac 0.4%  (ACULAR) 0.4 % Drop, Place 1 drop into both eyes 4 (four) times daily., Disp: , Rfl:     LUCENTIS 0.5 mg/0.05 mL Syrg injection, , Disp: , Rfl:     magnesium oxide 500 mg Cap, Take 1 capsule by mouth once daily., Disp: , Rfl:     multivitamin capsule, Take 1 capsule by mouth once daily., Disp: , Rfl:     nebivoloL (BYSTOLIC) 5 MG Tab, Take 1 tablet (5 mg total) by mouth once daily., Disp: 90 tablet, Rfl: 0    nystatin-triamcinolone (MYCOLOG II) cream, Apply topically 2 (two) times daily., Disp: 60 g, Rfl: 2    phentermine (ADIPEX-P) 37.5 mg tablet, Take 1 tablet (37.5 mg total) by mouth once daily., Disp: 90 tablet, Rfl: 0    progesterone (PROMETRIUM) 200 MG capsule, Take 200 mg by mouth., Disp: , Rfl:     salmeteroL (SEREVENT) 50 mcg/dose diskus inhaler, Inhale 1 puff into the lungs 2 (two) times daily. Controller, Disp: 3 each, Rfl: 3    triamterene-hydrochlorothiazide 37.5-25 mg (MAXZIDE-25) 37.5-25 mg per tablet, TAKE 1 TABLET DAILY, Disp: 90 tablet, Rfl: 0    turmeric root extract 500 mg Cap, Take 500 mg by mouth once daily. , Disp: , Rfl:     albuterol (PROVENTIL/VENTOLIN HFA) 90 mcg/actuation inhaler, Inhale 1-2 puffs into the lungs every 4 to 6 hours as needed for Wheezing or Shortness of Breath. Rescue, Disp: 18 g, Rfl: 3    Review of patient's allergies indicates:   Allergen Reactions    Adhesive Hives    Lisinopril Other (See Comments)     Chest pain    Meloxicam     Meperidine     Oxycodone-acetaminophen        Family History   Problem Relation Age of Onset    Coronary artery disease Mother     Hyperlipidemia Mother     Other Mother         Breast Nodules    Arthritis Mother     Hypertension Father     Atrial fibrillation Father     Neuropathy Father     Other Father         Bypass    Diabetes Father     Hypertension Sister     Cancer Sister         Breast Cacner    Neuropathy Sister     Breast cancer Sister     Hypertension Daughter     No Known Problems Son     Diabetes Maternal Grandmother      Hypertension Maternal Grandmother     No Known Problems Maternal Grandfather     Coronary artery disease Paternal Grandmother     Cancer Paternal Grandmother         Breast Cancer    Heart attack Paternal Grandmother     Breast cancer Paternal Grandmother     Dementia Paternal Grandfather     Heart attack Paternal Grandfather     No Known Problems Sister     Other Daughter         Benign Hypertension(Brain)    Breast cancer Maternal Aunt     Breast cancer Paternal Aunt      Social History     Socioeconomic History    Marital status:     Number of children: 3   Tobacco Use    Smoking status: Never    Smokeless tobacco: Never   Substance and Sexual Activity    Alcohol use: Yes     Alcohol/week: 3.0 standard drinks of alcohol     Types: 3 Glasses of wine per week     Comment: 3 glasses of wine per week     Drug use: Never     Social Determinants of Health     Financial Resource Strain: Low Risk  (8/11/2023)    Overall Financial Resource Strain (CARDIA)     Difficulty of Paying Living Expenses: Not very hard   Food Insecurity: No Food Insecurity (8/11/2023)    Hunger Vital Sign     Worried About Running Out of Food in the Last Year: Never true     Ran Out of Food in the Last Year: Never true   Transportation Needs: No Transportation Needs (8/11/2023)    PRAPARE - Transportation     Lack of Transportation (Medical): No     Lack of Transportation (Non-Medical): No   Physical Activity: Sufficiently Active (8/11/2023)    Exercise Vital Sign     Days of Exercise per Week: 5 days     Minutes of Exercise per Session: 50 min   Stress: No Stress Concern Present (8/11/2023)    [employer] [place] [employer] - Occupational Stress Questionnaire     Feeling of Stress : Only a little   Social Connections: Unknown (8/11/2023)    Social Connection and Isolation Panel [NHANES]     Frequency of Communication with Friends and Family: More than three times a week     Frequency of Social Gatherings with Friends and Family:  Once a week     Active Member of Clubs or Organizations: Yes     Attends Club or Organization Meetings: More than 4 times per year     Marital Status:    Housing Stability: Low Risk  (8/11/2023)    Housing Stability Vital Sign     Unable to Pay for Housing in the Last Year: No     Number of Places Lived in the Last Year: 1     Unstable Housing in the Last Year: No       Review of Systems   Respiratory: Negative.     Cardiovascular: Negative.    Gastrointestinal:  Positive for abdominal pain.     Objective:     Physical Exam  Constitutional:       General: She is not in acute distress.     Appearance: She is well-developed.   HENT:      Head: Normocephalic and atraumatic.   Eyes:      General: No scleral icterus.     Conjunctiva/sclera: Conjunctivae normal.      Pupils: Pupils are equal, round, and reactive to light.   Neck:      Thyroid: No thyromegaly.   Cardiovascular:      Rate and Rhythm: Normal rate and regular rhythm.      Heart sounds: No murmur heard.  Pulmonary:      Effort: Pulmonary effort is normal.      Breath sounds: Normal breath sounds. No wheezing or rales.   Abdominal:      General: Bowel sounds are normal. There is no distension.      Palpations: Abdomen is soft. There is no mass.      Tenderness: There is no abdominal tenderness.      Hernia: A hernia is present.      Comments: There is a palpable supraumbilical ventral hernia.  This is reducible.  It is tender on manipulation.   Musculoskeletal:         General: Normal range of motion.      Cervical back: Normal range of motion.   Lymphadenopathy:      Cervical: No cervical adenopathy.   Skin:     General: Skin is warm and dry.      Findings: No erythema or rash.   Neurological:      Mental Status: She is alert and oriented to person, place, and time.   Psychiatric:         Behavior: Behavior normal.         CT scan is reviewed.  Images reveal a supraumbilical ventral hernia proximally 4 cm in diameter.  There was not appear to be any  involved bowel.    Assessment:     Encounter Diagnoses   Name Primary?    Incisional hernia, without obstruction or gangrene Yes    Incisional hernia        Plan:      Plan robotic ventral hernia repair with mesh implantation.  Risks and benefits of this were discussed at length.  She understands that we may need to convert to an open procedure.  Preoperative laboratory studies were ordered.  Risks and benefits of the planned procedure were discussed at length with the patient.  Risks and benefits of not proceeding with the procedure were discussed as well. All questions were answered. The patient expressed clear understanding and would like to proceed with the procedure as discussed.

## 2023-11-01 NOTE — H&P (VIEW-ONLY)
Subjective:       Patient ID: Daniela Lawson is a 58 y.o. female.    Chief Complaint: Hernia      HPI 58-year-old female presents with a diagnosis of a ventral hernia.  She does have some pain with the hernia.  She has a history of nausea but there has been no vomiting.  She denies any fever or chills.  The hernia protruded recently and she had pain for a whole day.  She is stable at this time.  It is nontender.    Past Medical History:   Diagnosis Date    Anesthesia     Pt reports severe drop in BP when anesthesia given.    Asthma     Diverticulitis     GERD (gastroesophageal reflux disease)     Hypertension     Neuropathy     Stroke     RETINACULAR      Past Surgical History:   Procedure Laterality Date     SECTION  1990    CHOLECYSTECTOMY      HYSTERECTOMY      INSERTION OF URETERAL CATHETER N/A 10/16/2019    Procedure: INSERTION, CATHETER, URETER;  Surgeon: Willard Ruffin MD;  Location: [facility];  Service: Urology;  Laterality: N/A;    LAPAROSCOPIC SIGMOIDECTOMY N/A 10/16/2019    Procedure: COLECTOMY, SIGMOID, LAPAROSCOPIC;  Surgeon: Doug Gifford MD;  Location: [facility];  Service: General;  Laterality: N/A;    SHOULDER ARTHROSCOPY Bilateral     STOMACH SURGERY      Possible Nissen per pt- To treat acid reflux     TUBAL LIGATION  1990         Current Outpatient Medications:     amLODIPine (NORVASC) 2.5 MG tablet, Take 1 tablet (2.5 mg total) by mouth once daily., Disp: 90 tablet, Rfl: 0    aspirin (ECOTRIN) 81 MG EC tablet, Take 81 mg by mouth once daily., Disp: , Rfl:     cetirizine 10 mg chewable tablet, Take 10 mg by mouth once daily., Disp: , Rfl:     cholecalciferol, vitamin D3, 125 mcg (5,000 unit) Tab, 5,000 Units once daily. , Disp: , Rfl:     cyanocobalamin, vitamin B-12, 5,000 mcg TbDL, Take 5,000 Units by mouth once daily. , Disp: , Rfl:     icosapent ethyL (VASCEPA) 1 gram Cap, TAKE 2 CAPSULES (2 GRAMS) TWICE A DAY, Disp: 360 capsule, Rfl: 1    ketorolac 0.4%  (ACULAR) 0.4 % Drop, Place 1 drop into both eyes 4 (four) times daily., Disp: , Rfl:     LUCENTIS 0.5 mg/0.05 mL Syrg injection, , Disp: , Rfl:     magnesium oxide 500 mg Cap, Take 1 capsule by mouth once daily., Disp: , Rfl:     multivitamin capsule, Take 1 capsule by mouth once daily., Disp: , Rfl:     nebivoloL (BYSTOLIC) 5 MG Tab, Take 1 tablet (5 mg total) by mouth once daily., Disp: 90 tablet, Rfl: 0    nystatin-triamcinolone (MYCOLOG II) cream, Apply topically 2 (two) times daily., Disp: 60 g, Rfl: 2    phentermine (ADIPEX-P) 37.5 mg tablet, Take 1 tablet (37.5 mg total) by mouth once daily., Disp: 90 tablet, Rfl: 0    progesterone (PROMETRIUM) 200 MG capsule, Take 200 mg by mouth., Disp: , Rfl:     salmeteroL (SEREVENT) 50 mcg/dose diskus inhaler, Inhale 1 puff into the lungs 2 (two) times daily. Controller, Disp: 3 each, Rfl: 3    triamterene-hydrochlorothiazide 37.5-25 mg (MAXZIDE-25) 37.5-25 mg per tablet, TAKE 1 TABLET DAILY, Disp: 90 tablet, Rfl: 0    turmeric root extract 500 mg Cap, Take 500 mg by mouth once daily. , Disp: , Rfl:     albuterol (PROVENTIL/VENTOLIN HFA) 90 mcg/actuation inhaler, Inhale 1-2 puffs into the lungs every 4 to 6 hours as needed for Wheezing or Shortness of Breath. Rescue, Disp: 18 g, Rfl: 3    Review of patient's allergies indicates:   Allergen Reactions    Adhesive Hives    Lisinopril Other (See Comments)     Chest pain    Meloxicam     Meperidine     Oxycodone-acetaminophen        Family History   Problem Relation Age of Onset    Coronary artery disease Mother     Hyperlipidemia Mother     Other Mother         Breast Nodules    Arthritis Mother     Hypertension Father     Atrial fibrillation Father     Neuropathy Father     Other Father         Bypass    Diabetes Father     Hypertension Sister     Cancer Sister         Breast Cacner    Neuropathy Sister     Breast cancer Sister     Hypertension Daughter     No Known Problems Son     Diabetes Maternal Grandmother      Hypertension Maternal Grandmother     No Known Problems Maternal Grandfather     Coronary artery disease Paternal Grandmother     Cancer Paternal Grandmother         Breast Cancer    Heart attack Paternal Grandmother     Breast cancer Paternal Grandmother     Dementia Paternal Grandfather     Heart attack Paternal Grandfather     No Known Problems Sister     Other Daughter         Benign Hypertension(Brain)    Breast cancer Maternal Aunt     Breast cancer Paternal Aunt      Social History     Socioeconomic History    Marital status:     Number of children: 3   Tobacco Use    Smoking status: Never    Smokeless tobacco: Never   Substance and Sexual Activity    Alcohol use: Yes     Alcohol/week: 3.0 standard drinks of alcohol     Types: 3 Glasses of wine per week     Comment: 3 glasses of wine per week     Drug use: Never     Social Determinants of Health     Financial Resource Strain: Low Risk  (8/11/2023)    Overall Financial Resource Strain (CARDIA)     Difficulty of Paying Living Expenses: Not very hard   Food Insecurity: No Food Insecurity (8/11/2023)    Hunger Vital Sign     Worried About Running Out of Food in the Last Year: Never true     Ran Out of Food in the Last Year: Never true   Transportation Needs: No Transportation Needs (8/11/2023)    PRAPARE - Transportation     Lack of Transportation (Medical): No     Lack of Transportation (Non-Medical): No   Physical Activity: Sufficiently Active (8/11/2023)    Exercise Vital Sign     Days of Exercise per Week: 5 days     Minutes of Exercise per Session: 50 min   Stress: No Stress Concern Present (8/11/2023)    [employer] [place] [employer] - Occupational Stress Questionnaire     Feeling of Stress : Only a little   Social Connections: Unknown (8/11/2023)    Social Connection and Isolation Panel [NHANES]     Frequency of Communication with Friends and Family: More than three times a week     Frequency of Social Gatherings with Friends and Family:  Once a week     Active Member of Clubs or Organizations: Yes     Attends Club or Organization Meetings: More than 4 times per year     Marital Status:    Housing Stability: Low Risk  (8/11/2023)    Housing Stability Vital Sign     Unable to Pay for Housing in the Last Year: No     Number of Places Lived in the Last Year: 1     Unstable Housing in the Last Year: No       Review of Systems   Respiratory: Negative.     Cardiovascular: Negative.    Gastrointestinal:  Positive for abdominal pain.     Objective:     Physical Exam  Constitutional:       General: She is not in acute distress.     Appearance: She is well-developed.   HENT:      Head: Normocephalic and atraumatic.   Eyes:      General: No scleral icterus.     Conjunctiva/sclera: Conjunctivae normal.      Pupils: Pupils are equal, round, and reactive to light.   Neck:      Thyroid: No thyromegaly.   Cardiovascular:      Rate and Rhythm: Normal rate and regular rhythm.      Heart sounds: No murmur heard.  Pulmonary:      Effort: Pulmonary effort is normal.      Breath sounds: Normal breath sounds. No wheezing or rales.   Abdominal:      General: Bowel sounds are normal. There is no distension.      Palpations: Abdomen is soft. There is no mass.      Tenderness: There is no abdominal tenderness.      Hernia: A hernia is present.      Comments: There is a palpable supraumbilical ventral hernia.  This is reducible.  It is tender on manipulation.   Musculoskeletal:         General: Normal range of motion.      Cervical back: Normal range of motion.   Lymphadenopathy:      Cervical: No cervical adenopathy.   Skin:     General: Skin is warm and dry.      Findings: No erythema or rash.   Neurological:      Mental Status: She is alert and oriented to person, place, and time.   Psychiatric:         Behavior: Behavior normal.         CT scan is reviewed.  Images reveal a supraumbilical ventral hernia proximally 4 cm in diameter.  There was not appear to be any  involved bowel.    Assessment:     Encounter Diagnoses   Name Primary?    Incisional hernia, without obstruction or gangrene Yes    Incisional hernia        Plan:      Plan robotic ventral hernia repair with mesh implantation.  Risks and benefits of this were discussed at length.  She understands that we may need to convert to an open procedure.  Preoperative laboratory studies were ordered.  Risks and benefits of the planned procedure were discussed at length with the patient.  Risks and benefits of not proceeding with the procedure were discussed as well. All questions were answered. The patient expressed clear understanding and would like to proceed with the procedure as discussed.

## 2023-11-02 ENCOUNTER — OFFICE VISIT (OUTPATIENT)
Dept: FAMILY MEDICINE | Facility: CLINIC | Age: 58
End: 2023-11-02
Payer: COMMERCIAL

## 2023-11-02 VITALS
HEART RATE: 64 BPM | BODY MASS INDEX: 29.37 KG/M2 | DIASTOLIC BLOOD PRESSURE: 88 MMHG | RESPIRATION RATE: 18 BRPM | SYSTOLIC BLOOD PRESSURE: 136 MMHG | WEIGHT: 172 LBS | TEMPERATURE: 98 F | HEIGHT: 64 IN

## 2023-11-02 DIAGNOSIS — R30.0 DYSURIA: Primary | ICD-10-CM

## 2023-11-02 LAB
BILIRUB UR QL STRIP: NEGATIVE
GLUCOSE UR QL STRIP: NEGATIVE
KETONES UR QL STRIP: NEGATIVE
LEUKOCYTE ESTERASE UR QL STRIP: NEGATIVE
PH, POC UA: 6.5 (ref 5–8.5)
POC BLOOD, URINE: NEGATIVE
POC NITRATES, URINE: NEGATIVE
PROT UR QL STRIP: NEGATIVE
SP GR UR STRIP: 1.01 (ref 1–1.03)
UROBILINOGEN UR STRIP-ACNC: NORMAL (ref 0.2–8)

## 2023-11-02 PROCEDURE — 3079F DIAST BP 80-89 MM HG: CPT | Mod: CPTII,S$GLB,, | Performed by: NURSE PRACTITIONER

## 2023-11-02 PROCEDURE — 3079F PR MOST RECENT DIASTOLIC BLOOD PRESSURE 80-89 MM HG: ICD-10-PCS | Mod: CPTII,S$GLB,, | Performed by: NURSE PRACTITIONER

## 2023-11-02 PROCEDURE — 3008F PR BODY MASS INDEX (BMI) DOCUMENTED: ICD-10-PCS | Mod: CPTII,S$GLB,, | Performed by: NURSE PRACTITIONER

## 2023-11-02 PROCEDURE — 99213 OFFICE O/P EST LOW 20 MIN: CPT | Mod: S$GLB,,, | Performed by: NURSE PRACTITIONER

## 2023-11-02 PROCEDURE — 3075F SYST BP GE 130 - 139MM HG: CPT | Mod: CPTII,S$GLB,, | Performed by: NURSE PRACTITIONER

## 2023-11-02 PROCEDURE — 3008F BODY MASS INDEX DOCD: CPT | Mod: CPTII,S$GLB,, | Performed by: NURSE PRACTITIONER

## 2023-11-02 PROCEDURE — 99213 PR OFFICE/OUTPT VISIT, EST, LEVL III, 20-29 MIN: ICD-10-PCS | Mod: S$GLB,,, | Performed by: NURSE PRACTITIONER

## 2023-11-02 PROCEDURE — 81003 URINALYSIS AUTO W/O SCOPE: CPT | Mod: QW,S$GLB,, | Performed by: NURSE PRACTITIONER

## 2023-11-02 PROCEDURE — 1159F PR MEDICATION LIST DOCUMENTED IN MEDICAL RECORD: ICD-10-PCS | Mod: CPTII,S$GLB,, | Performed by: NURSE PRACTITIONER

## 2023-11-02 PROCEDURE — 3075F PR MOST RECENT SYSTOLIC BLOOD PRESS GE 130-139MM HG: ICD-10-PCS | Mod: CPTII,S$GLB,, | Performed by: NURSE PRACTITIONER

## 2023-11-02 PROCEDURE — 1159F MED LIST DOCD IN RCRD: CPT | Mod: CPTII,S$GLB,, | Performed by: NURSE PRACTITIONER

## 2023-11-02 PROCEDURE — 81003 POCT URINALYSIS, DIPSTICK, AUTOMATED, W/O SCOPE: ICD-10-PCS | Mod: QW,S$GLB,, | Performed by: NURSE PRACTITIONER

## 2023-11-02 RX ORDER — NITROFURANTOIN 25; 75 MG/1; MG/1
100 CAPSULE ORAL 2 TIMES DAILY
Qty: 14 CAPSULE | Refills: 0 | Status: SHIPPED | OUTPATIENT
Start: 2023-11-02 | End: 2023-11-10

## 2023-11-02 NOTE — PROGRESS NOTES
12/31/2019       Vicki L Spatz  [address]  [address] [facility] [address]             Dear Chikis,    We have been unable to contact you via the telephone regarding test you are due to complete.     At your earliest convenience, please call to schedule a mammogram.     Thank you,      Darby Chavira MD  [address] [place]  [address] [phone]    Patient ID: Daniela Lawson is a 58 y.o. female.    Chief Complaint: Urinary Tract Infection (59 yo female here c/o frequent urination with cloudy color, strong odor, and little discomfort when urinating times 10 days. No report of fever or chills. KM//Pt refused flu vaccine. KM)    Ms. Lawson presents today for evaluation of  urinary tract symptoms. She is a patient of [facility].     Urinary Tract Infection   This is a new problem. The current episode started 1 to 4 weeks ago. The problem occurs every urination. The problem has been waxing and waning. The quality of the pain is described as burning and aching. The pain is at a severity of 3/10. The pain is mild. There has been no fever. She is Sexually active. There is No history of pyelonephritis. Associated symptoms include frequency, nausea and urgency. Pertinent negatives include no behavior changes, chills, discharge, flank pain, hematuria, hesitancy, possible pregnancy, sweats, vomiting, weight loss, constipation, rash or withholding. She has tried increased fluids for the symptoms. The treatment provided mild relief. Her past medical history is significant for hypertension and recurrent UTIs. There is no history of catheterization, diabetes insipidus, diabetes mellitus, genitourinary reflux, kidney stones, a single kidney, STD, urinary stasis or a urological procedure.   Dysuria   This is a new problem. The current episode started 1 to 4 weeks ago. The problem occurs every urination. The problem has been gradually worsening. The quality of the pain is described as aching. The pain is at a severity of 3/10. The pain is mild. There has been no fever. The fever has been present for Less than 1 day. She is Sexually active. There is A history of pyelonephritis. Associated symptoms include frequency, nausea and urgency. Pertinent negatives include no behavior changes, chills, discharge, flank pain, hematuria, hesitancy, possible pregnancy, sweats,  vomiting, weight loss, constipation, rash or withholding. She has tried acetaminophen and increased fluids for the symptoms. The treatment provided no relief. Her past medical history is significant for hypertension and recurrent UTIs. There is no history of catheterization, diabetes insipidus, diabetes mellitus, genitourinary reflux, kidney stones, a single kidney, STD, urinary stasis or a urological procedure.           Past Medical History:   Diagnosis Date    Anesthesia 2009    Pt reports severe drop in BP when anesthesia given.    Asthma     Diverticulitis     GERD (gastroesophageal reflux disease)     Hypertension     Neuropathy     Stroke     RETINACULAR      Past Surgical History:   Procedure Laterality Date     SECTION  1990    CHOLECYSTECTOMY      HYSTERECTOMY      INSERTION OF URETERAL CATHETER N/A 10/16/2019    Procedure: INSERTION, CATHETER, URETER;  Surgeon: Willard Ruffin MD;  Location: [facility] OR;  Service: Urology;  Laterality: N/A;    LAPAROSCOPIC SIGMOIDECTOMY N/A 10/16/2019    Procedure: COLECTOMY, SIGMOID, LAPAROSCOPIC;  Surgeon: Doug Gifford MD;  Location: [facility] OR;  Service: General;  Laterality: N/A;    SHOULDER ARTHROSCOPY Bilateral     STOMACH SURGERY      Possible Nissen per pt- To treat acid reflux     TUBAL LIGATION  1990         Tobacco History:  reports that she has never smoked. She has never used smokeless tobacco.      Review of patient's allergies indicates:   Allergen Reactions    Adhesive Hives    Lisinopril Other (See Comments)     Chest pain    Meloxicam     Meperidine     Oxycodone-acetaminophen        Current Outpatient Medications:     amLODIPine (NORVASC) 2.5 MG tablet, Take 1 tablet (2.5 mg total) by mouth once daily., Disp: 90 tablet, Rfl: 0    aspirin (ECOTRIN) 81 MG EC tablet, Take 81 mg by mouth once daily., Disp: , Rfl:     cetirizine 10 mg chewable tablet, Take 10 mg by mouth once daily., Disp: , Rfl:     cholecalciferol, vitamin D3,  125 mcg (5,000 unit) Tab, 5,000 Units once daily. , Disp: , Rfl:     cyanocobalamin, vitamin B-12, 5,000 mcg TbDL, Take 5,000 Units by mouth once daily. , Disp: , Rfl:     icosapent ethyL (VASCEPA) 1 gram Cap, TAKE 2 CAPSULES (2 GRAMS) TWICE A DAY, Disp: 360 capsule, Rfl: 1    ketorolac 0.4% (ACULAR) 0.4 % Drop, Place 1 drop into both eyes 4 (four) times daily., Disp: , Rfl:     LUCENTIS 0.5 mg/0.05 mL Syrg injection, , Disp: , Rfl:     magnesium oxide 500 mg Cap, Take 1 capsule by mouth once daily., Disp: , Rfl:     multivitamin capsule, Take 1 capsule by mouth once daily., Disp: , Rfl:     nebivoloL (BYSTOLIC) 5 MG Tab, Take 1 tablet (5 mg total) by mouth once daily., Disp: 90 tablet, Rfl: 0    nystatin-triamcinolone (MYCOLOG II) cream, Apply topically 2 (two) times daily., Disp: 60 g, Rfl: 2    phentermine (ADIPEX-P) 37.5 mg tablet, Take 1 tablet (37.5 mg total) by mouth once daily., Disp: 90 tablet, Rfl: 0    progesterone (PROMETRIUM) 200 MG capsule, Take 200 mg by mouth., Disp: , Rfl:     salmeteroL (SEREVENT) 50 mcg/dose diskus inhaler, Inhale 1 puff into the lungs 2 (two) times daily. Controller, Disp: 3 each, Rfl: 3    triamterene-hydrochlorothiazide 37.5-25 mg (MAXZIDE-25) 37.5-25 mg per tablet, TAKE 1 TABLET DAILY, Disp: 90 tablet, Rfl: 0    turmeric root extract 500 mg Cap, Take 500 mg by mouth once daily. , Disp: , Rfl:     albuterol (PROVENTIL/VENTOLIN HFA) 90 mcg/actuation inhaler, Inhale 1-2 puffs into the lungs every 4 to 6 hours as needed for Wheezing or Shortness of Breath. Rescue, Disp: 18 g, Rfl: 3    nitrofurantoin, macrocrystal-monohydrate, (MACROBID) 100 MG capsule, Take 1 capsule (100 mg total) by mouth 2 (two) times daily. for 7 days, Disp: 14 capsule, Rfl: 0    Review of Systems   Constitutional:  Negative for chills and weight loss.   Gastrointestinal:  Positive for nausea. Negative for constipation and vomiting.   Genitourinary:  Positive for dysuria, frequency and urgency. Negative for  "flank pain, hematuria and hesitancy.   Skin:  Negative for rash.          Objective:      Vitals:    11/02/23 1408   BP: 136/88   Pulse: 64   Resp: 18   Temp: 98.2 °F (36.8 °C)   TempSrc: Oral   Weight: 78 kg (172 lb)   Height: 5' 4" (1.626 m)     Physical Exam  Cardiovascular:      Rate and Rhythm: Normal rate and regular rhythm.   Pulmonary:      Effort: Pulmonary effort is normal.      Breath sounds: Normal breath sounds.   Skin:     General: Skin is warm.   Neurological:      Mental Status: She is alert and oriented to person, place, and time.           Assessment:       1. Dysuria           Plan:       Dysuria  -     POCT Urinalysis, Dipstick, Automated, W/O Scope  -     nitrofurantoin, macrocrystal-monohydrate, (MACROBID) 100 MG capsule; Take 1 capsule (100 mg total) by mouth 2 (two) times daily. for 7 days  Dispense: 14 capsule; Refill: 0  -     Urine culture; Future; Expected date: 11/02/2023      Follow up if symptoms worsen or fail to improve.        11/2/2023 Jacque Mancuso NP      Answers submitted by the patient for this visit:  Painful Urination Questionnaire (Submitted on 11/2/2023)  Chief Complaint: Dysuria  Chronicity: new  Onset: 1 to 4 weeks ago  Frequency: every urination  Progression since onset: gradually worsening  Pain quality: aching  Pain - numeric: 3/10  Fever: no fever  Fever duration: less than 1 day  Sexually active?: Yes  History of pyelonephritis?: Yes  chills: No  discharge: No  flank pain: No  frequency: Yes  hematuria: No  hesitancy: No  nausea: Yes  possible pregnancy: No  sweats: No  urgency: Yes  vomiting: No  constipation: No  rash: No  weight loss: No  withholding: No  behavior changes: No  Treatments tried: acetaminophen, increased fluids  Improvement on treatment: no relief  Pain severity: mild  catheterization: No  diabetes insipidus: No  diabetes mellitus: No  genitourinary reflux: No  hypertension: Yes  recurrent UTIs: Yes  single kidney: No  STD: No  urinary " stasis: No  urological procedure: No  kidney stones: No

## 2023-11-06 DIAGNOSIS — I10 HYPERTENSION, UNSPECIFIED TYPE: ICD-10-CM

## 2023-11-08 RX ORDER — TRIAMTERENE/HYDROCHLOROTHIAZID 37.5-25 MG
TABLET ORAL
Qty: 90 TABLET | Refills: 0 | Status: SHIPPED | OUTPATIENT
Start: 2023-11-08 | End: 2023-11-15 | Stop reason: SDUPTHER

## 2023-11-10 ENCOUNTER — HOSPITAL ENCOUNTER (OUTPATIENT)
Dept: PREADMISSION TESTING | Facility: HOSPITAL | Age: 58
Discharge: HOME | End: 2023-11-10
Attending: SURGERY
Payer: COMMERCIAL

## 2023-11-10 VITALS — BODY MASS INDEX: 28.68 KG/M2 | WEIGHT: 168 LBS | HEIGHT: 64 IN

## 2023-11-10 DIAGNOSIS — Z01.818 PRE-OP TESTING: ICD-10-CM

## 2023-11-10 DIAGNOSIS — K43.2 INCISIONAL HERNIA, WITHOUT OBSTRUCTION OR GANGRENE: ICD-10-CM

## 2023-11-10 PROCEDURE — 93010 ELECTROCARDIOGRAM REPORT: CPT | Mod: ,,, | Performed by: GENERAL PRACTICE

## 2023-11-10 PROCEDURE — 93010 EKG 12-LEAD: ICD-10-PCS | Mod: ,,, | Performed by: GENERAL PRACTICE

## 2023-11-10 PROCEDURE — 93005 ELECTROCARDIOGRAM TRACING: CPT

## 2023-11-10 NOTE — DISCHARGE INSTRUCTIONS
To confirm, Your doctor has instructed you that surgery is scheduled for:     Please report to [facility], Registration the morning of surgery. You must check-in and receive a wristband before going to your procedure.  [address] [place] [address] [place] [address]    Pre-Op will call the afternoon prior to surgery between 1:00 and 6:00 PM with the final arrival time.  Phone number: [phone]    PLEASE NOTE:  The surgery schedule has many variables which may affect the time of your surgery case.  Family members should be available if your surgery time changes.  Plan to be here the day of your procedure between 4-6 hours.    MEDICATIONS:  TAKE ONLY THESE MEDICATIONS WITH A SMALL SIP OF WATER THE MORNING OF YOUR PROCEDURE:      DO NOT TAKE THESE MEDICATIONS 5-7 DAYS PRIOR to your procedure or per your surgeon's request:   ASPIRIN, ALEVE, ADVIL, IBUPROFEN, FISH OIL VITAMIN E, HERBALS  (May take Tylenol)    ONLY if you are prescribed any types of blood thinners such as:  Aspirin, Coumadin, Plavix, Pradaxa, Xarelto, Aggrenox, Effient, Eliquis, Savasya, Brilinta, or any other, ask your surgeon whether you should stop taking them and how long before surgery you should stop.  You may also need to verify with the prescribing physician if it is ok to stop your medication.      INSTRUCTIONS IMPORTANT!!  Do not eat or drink anything between midnight and the time of your procedure- this includes gum, mints, and candy.  Do not smoke or drink alcoholic beverages 24 hours prior to your procedure.  Shower the night before AND the morning of your procedure with a Chlorhexidine wash such as Hibiclens or Dial antibacterial soap from the neck down.  Do not get it on your face or in your eyes.  You may use your own shampoo and face wash. This helps your skin to be as bacteria free as possible.    If you wear contact lenses, dentures, hearing aids or glasses, bring a container to put them in during surgery and give to a  family member for safe keeping.  Please leave all jewelry, piercing's and valuables at home. You must remove your false eyelashes prior to surgery.    DO NOT remove hair from the surgery site.  Do not shave the incision site unless you are given specific instructions to do so.    ONLY if you have been diagnosed with sleep apnea please bring your C-PAP machine.  ONLY if you wear home oxygen please bring your portable oxygen tank the day of your procedure.  ONLY if you have a history of OPEN HEART SURGERY you will need a clearance from your Cardiologist per Anesthesia.      ONLY for patients requiring bowel prep, written instructions will be given by your doctor's office.  ONLY if you have a neuro stimulator, please bring the controller with you the morning of surgery  ONLY if a type and screen test is needed before surgery, please return:  If your doctor has scheduled you for an overnight stay, bring a small overnight bag with any personal items you need.  Make arrangements in advance for transportation home by a responsible adult.  It is not safe to drive a vehicle during the 24 hours after anesthesia.          All  facilities and properties are tobacco free.  Smoking is NOT allowed.   If you have any questions about these instructions, call Pre-Op Admit  Nursing at [phone] or the Pre-Op Day Surgery Unit at [phone].

## 2023-11-14 ENCOUNTER — ANESTHESIA EVENT (OUTPATIENT)
Dept: SURGERY | Facility: HOSPITAL | Age: 58
End: 2023-11-14
Payer: COMMERCIAL

## 2023-11-15 ENCOUNTER — OFFICE VISIT (OUTPATIENT)
Dept: FAMILY MEDICINE | Facility: CLINIC | Age: 58
End: 2023-11-15
Payer: COMMERCIAL

## 2023-11-15 VITALS
HEART RATE: 66 BPM | WEIGHT: 172.31 LBS | SYSTOLIC BLOOD PRESSURE: 116 MMHG | HEIGHT: 64 IN | BODY MASS INDEX: 29.42 KG/M2 | OXYGEN SATURATION: 97 % | DIASTOLIC BLOOD PRESSURE: 72 MMHG

## 2023-11-15 DIAGNOSIS — Z00.00 ROUTINE HEALTH MAINTENANCE: Primary | ICD-10-CM

## 2023-11-15 DIAGNOSIS — I10 HYPERTENSION, UNSPECIFIED TYPE: ICD-10-CM

## 2023-11-15 PROCEDURE — 99396 PR PREVENTIVE VISIT,EST,40-64: ICD-10-PCS | Mod: S$GLB,,, | Performed by: NURSE PRACTITIONER

## 2023-11-15 PROCEDURE — 3074F PR MOST RECENT SYSTOLIC BLOOD PRESSURE < 130 MM HG: ICD-10-PCS | Mod: CPTII,S$GLB,, | Performed by: NURSE PRACTITIONER

## 2023-11-15 PROCEDURE — 1160F PR REVIEW ALL MEDS BY PRESCRIBER/CLIN PHARMACIST DOCUMENTED: ICD-10-PCS | Mod: CPTII,S$GLB,, | Performed by: NURSE PRACTITIONER

## 2023-11-15 PROCEDURE — 3008F PR BODY MASS INDEX (BMI) DOCUMENTED: ICD-10-PCS | Mod: CPTII,S$GLB,, | Performed by: NURSE PRACTITIONER

## 2023-11-15 PROCEDURE — 1160F RVW MEDS BY RX/DR IN RCRD: CPT | Mod: CPTII,S$GLB,, | Performed by: NURSE PRACTITIONER

## 2023-11-15 PROCEDURE — 3078F DIAST BP <80 MM HG: CPT | Mod: CPTII,S$GLB,, | Performed by: NURSE PRACTITIONER

## 2023-11-15 PROCEDURE — 3078F PR MOST RECENT DIASTOLIC BLOOD PRESSURE < 80 MM HG: ICD-10-PCS | Mod: CPTII,S$GLB,, | Performed by: NURSE PRACTITIONER

## 2023-11-15 PROCEDURE — 1159F PR MEDICATION LIST DOCUMENTED IN MEDICAL RECORD: ICD-10-PCS | Mod: CPTII,S$GLB,, | Performed by: NURSE PRACTITIONER

## 2023-11-15 PROCEDURE — 1159F MED LIST DOCD IN RCRD: CPT | Mod: CPTII,S$GLB,, | Performed by: NURSE PRACTITIONER

## 2023-11-15 PROCEDURE — 3008F BODY MASS INDEX DOCD: CPT | Mod: CPTII,S$GLB,, | Performed by: NURSE PRACTITIONER

## 2023-11-15 PROCEDURE — 3074F SYST BP LT 130 MM HG: CPT | Mod: CPTII,S$GLB,, | Performed by: NURSE PRACTITIONER

## 2023-11-15 PROCEDURE — 99396 PREV VISIT EST AGE 40-64: CPT | Mod: S$GLB,,, | Performed by: NURSE PRACTITIONER

## 2023-11-15 RX ORDER — AMLODIPINE BESYLATE 2.5 MG/1
2.5 TABLET ORAL DAILY
Qty: 90 TABLET | Refills: 1 | Status: SHIPPED | OUTPATIENT
Start: 2023-11-15

## 2023-11-15 RX ORDER — TRIAMTERENE/HYDROCHLOROTHIAZID 37.5-25 MG
1 TABLET ORAL DAILY
Qty: 90 TABLET | Refills: 1 | Status: SHIPPED | OUTPATIENT
Start: 2023-11-15

## 2023-11-15 RX ORDER — NEBIVOLOL 5 MG/1
5 TABLET ORAL DAILY
Qty: 90 TABLET | Refills: 1 | Status: SHIPPED | OUTPATIENT
Start: 2023-11-15

## 2023-11-16 ENCOUNTER — HOSPITAL ENCOUNTER (OUTPATIENT)
Facility: HOSPITAL | Age: 58
Discharge: HOME | End: 2023-11-16
Attending: SURGERY | Admitting: SURGERY
Payer: COMMERCIAL

## 2023-11-16 ENCOUNTER — ANESTHESIA (OUTPATIENT)
Dept: SURGERY | Facility: HOSPITAL | Age: 58
End: 2023-11-16
Payer: COMMERCIAL

## 2023-11-16 DIAGNOSIS — K43.2 INCISIONAL HERNIA: Primary | ICD-10-CM

## 2023-11-16 DIAGNOSIS — K43.2 INCISIONAL HERNIA, WITHOUT OBSTRUCTION OR GANGRENE: ICD-10-CM

## 2023-11-16 PROCEDURE — 63600175 PHARM REV CODE 636 W HCPCS: Performed by: SURGERY

## 2023-11-16 PROCEDURE — 36000710: Performed by: SURGERY

## 2023-11-16 PROCEDURE — 25000003 PHARM REV CODE 250: Performed by: ANESTHESIOLOGY

## 2023-11-16 PROCEDURE — C1781 MESH (IMPLANTABLE): HCPCS | Performed by: SURGERY

## 2023-11-16 PROCEDURE — 63600175 PHARM REV CODE 636 W HCPCS: Performed by: NURSE ANESTHETIST, CERTIFIED REGISTERED

## 2023-11-16 PROCEDURE — 94799 UNLISTED PULMONARY SVC/PX: CPT

## 2023-11-16 PROCEDURE — D9220A PRA ANESTHESIA: Mod: CRNA,,, | Performed by: NURSE ANESTHETIST, CERTIFIED REGISTERED

## 2023-11-16 PROCEDURE — 37000008 HC ANESTHESIA 1ST 15 MINUTES: Performed by: SURGERY

## 2023-11-16 PROCEDURE — 25000003 PHARM REV CODE 250: Performed by: NURSE ANESTHETIST, CERTIFIED REGISTERED

## 2023-11-16 PROCEDURE — 63600175 PHARM REV CODE 636 W HCPCS: Performed by: ANESTHESIOLOGY

## 2023-11-16 PROCEDURE — 36000711: Performed by: SURGERY

## 2023-11-16 PROCEDURE — 49591 RPR AA HRN 1ST < 3 CM RDC: CPT | Mod: ,,, | Performed by: SURGERY

## 2023-11-16 PROCEDURE — 71000033 HC RECOVERY, INTIAL HOUR: Performed by: SURGERY

## 2023-11-16 PROCEDURE — 71000015 HC POSTOP RECOV 1ST HR: Performed by: SURGERY

## 2023-11-16 PROCEDURE — 71000039 HC RECOVERY, EACH ADD'L HOUR: Performed by: SURGERY

## 2023-11-16 PROCEDURE — 49591 PR REPAIR ANT ABD HERNIA INITIAL < 3 CM REDUCIBLE: ICD-10-PCS | Mod: ,,, | Performed by: SURGERY

## 2023-11-16 PROCEDURE — D9220A PRA ANESTHESIA: ICD-10-PCS | Mod: CRNA,,, | Performed by: NURSE ANESTHETIST, CERTIFIED REGISTERED

## 2023-11-16 PROCEDURE — D9220A PRA ANESTHESIA: ICD-10-PCS | Mod: ANES,,, | Performed by: ANESTHESIOLOGY

## 2023-11-16 PROCEDURE — 37000009 HC ANESTHESIA EA ADD 15 MINS: Performed by: SURGERY

## 2023-11-16 PROCEDURE — D9220A PRA ANESTHESIA: Mod: ANES,,, | Performed by: ANESTHESIOLOGY

## 2023-11-16 PROCEDURE — 27201423 OPTIME MED/SURG SUP & DEVICES STERILE SUPPLY: Performed by: SURGERY

## 2023-11-16 PROCEDURE — C9290 INJ, BUPIVACAINE LIPOSOME: HCPCS | Performed by: SURGERY

## 2023-11-16 DEVICE — COMPOSITE MESH MONOFILAMENT POLYPROPYLENE MESH WITH ABSORBABLE SYNTHETIC FILM AND MARKING
Type: IMPLANTABLE DEVICE | Site: ABDOMEN | Status: FUNCTIONAL
Brand: PARIETENE DS

## 2023-11-16 RX ORDER — HYDROMORPHONE HYDROCHLORIDE 2 MG/ML
0.2 INJECTION, SOLUTION INTRAMUSCULAR; INTRAVENOUS; SUBCUTANEOUS EVERY 5 MIN PRN
Status: DISCONTINUED | OUTPATIENT
Start: 2023-11-16 | End: 2023-11-16 | Stop reason: HOSPADM

## 2023-11-16 RX ORDER — DEXAMETHASONE SODIUM PHOSPHATE 4 MG/ML
INJECTION, SOLUTION INTRA-ARTICULAR; INTRALESIONAL; INTRAMUSCULAR; INTRAVENOUS; SOFT TISSUE
Status: DISCONTINUED | OUTPATIENT
Start: 2023-11-16 | End: 2023-11-16

## 2023-11-16 RX ORDER — KETOROLAC TROMETHAMINE 30 MG/ML
INJECTION, SOLUTION INTRAMUSCULAR; INTRAVENOUS
Status: DISCONTINUED | OUTPATIENT
Start: 2023-11-16 | End: 2023-11-16

## 2023-11-16 RX ORDER — SODIUM CHLORIDE, SODIUM LACTATE, POTASSIUM CHLORIDE, CALCIUM CHLORIDE 600; 310; 30; 20 MG/100ML; MG/100ML; MG/100ML; MG/100ML
INJECTION, SOLUTION INTRAVENOUS CONTINUOUS
Status: DISCONTINUED | OUTPATIENT
Start: 2023-11-16 | End: 2023-11-16 | Stop reason: HOSPADM

## 2023-11-16 RX ORDER — FENTANYL CITRATE 50 UG/ML
INJECTION, SOLUTION INTRAMUSCULAR; INTRAVENOUS
Status: DISCONTINUED | OUTPATIENT
Start: 2023-11-16 | End: 2023-11-16

## 2023-11-16 RX ORDER — CEFAZOLIN SODIUM 2 G/50ML
2 SOLUTION INTRAVENOUS
Status: COMPLETED | OUTPATIENT
Start: 2023-11-16 | End: 2023-11-16

## 2023-11-16 RX ORDER — ONDANSETRON 2 MG/ML
4 INJECTION INTRAMUSCULAR; INTRAVENOUS ONCE
Status: COMPLETED | OUTPATIENT
Start: 2023-11-16 | End: 2023-11-16

## 2023-11-16 RX ORDER — PROCHLORPERAZINE EDISYLATE 5 MG/ML
5 INJECTION INTRAMUSCULAR; INTRAVENOUS EVERY 4 HOURS PRN
Status: DISCONTINUED | OUTPATIENT
Start: 2023-11-16 | End: 2023-11-16 | Stop reason: HOSPADM

## 2023-11-16 RX ORDER — SODIUM CHLORIDE 9 MG/ML
INJECTION, SOLUTION INTRAVENOUS CONTINUOUS
Status: CANCELLED | OUTPATIENT
Start: 2023-11-16

## 2023-11-16 RX ORDER — ROCURONIUM BROMIDE 10 MG/ML
INJECTION, SOLUTION INTRAVENOUS
Status: DISCONTINUED | OUTPATIENT
Start: 2023-11-16 | End: 2023-11-16

## 2023-11-16 RX ORDER — LIDOCAINE HYDROCHLORIDE 10 MG/ML
0.5 INJECTION, SOLUTION EPIDURAL; INFILTRATION; INTRACAUDAL; PERINEURAL ONCE
Status: DISCONTINUED | OUTPATIENT
Start: 2023-11-16 | End: 2023-11-16 | Stop reason: HOSPADM

## 2023-11-16 RX ORDER — SODIUM CHLORIDE 9 MG/ML
INJECTION, SOLUTION INTRAVENOUS CONTINUOUS
Status: DISCONTINUED | OUTPATIENT
Start: 2023-11-16 | End: 2023-11-16 | Stop reason: HOSPADM

## 2023-11-16 RX ORDER — ONDANSETRON 2 MG/ML
INJECTION INTRAMUSCULAR; INTRAVENOUS
Status: DISCONTINUED | OUTPATIENT
Start: 2023-11-16 | End: 2023-11-16

## 2023-11-16 RX ORDER — ACETAMINOPHEN 10 MG/ML
INJECTION, SOLUTION INTRAVENOUS
Status: DISCONTINUED | OUTPATIENT
Start: 2023-11-16 | End: 2023-11-16

## 2023-11-16 RX ORDER — DIPHENHYDRAMINE HYDROCHLORIDE 50 MG/ML
25 INJECTION INTRAMUSCULAR; INTRAVENOUS EVERY 6 HOURS PRN
Status: DISCONTINUED | OUTPATIENT
Start: 2023-11-16 | End: 2023-11-16 | Stop reason: HOSPADM

## 2023-11-16 RX ORDER — HYDROCODONE BITARTRATE AND ACETAMINOPHEN 5; 325 MG/1; MG/1
1 TABLET ORAL EVERY 6 HOURS PRN
Qty: 10 TABLET | Refills: 0 | Status: SHIPPED | OUTPATIENT
Start: 2023-11-16

## 2023-11-16 RX ORDER — SCOLOPAMINE TRANSDERMAL SYSTEM 1 MG/1
1 PATCH, EXTENDED RELEASE TRANSDERMAL
Status: DISCONTINUED | OUTPATIENT
Start: 2023-11-16 | End: 2023-11-16 | Stop reason: HOSPADM

## 2023-11-16 RX ORDER — BUPIVACAINE HYDROCHLORIDE 2.5 MG/ML
INJECTION, SOLUTION EPIDURAL; INFILTRATION; INTRACAUDAL
Status: DISCONTINUED | OUTPATIENT
Start: 2023-11-16 | End: 2023-11-16 | Stop reason: HOSPADM

## 2023-11-16 RX ORDER — PROPOFOL 10 MG/ML
VIAL (ML) INTRAVENOUS
Status: DISCONTINUED | OUTPATIENT
Start: 2023-11-16 | End: 2023-11-16

## 2023-11-16 RX ORDER — MIDAZOLAM HYDROCHLORIDE 1 MG/ML
INJECTION INTRAMUSCULAR; INTRAVENOUS
Status: DISCONTINUED | OUTPATIENT
Start: 2023-11-16 | End: 2023-11-16

## 2023-11-16 RX ORDER — HYDROCODONE BITARTRATE AND ACETAMINOPHEN 5; 325 MG/1; MG/1
1 TABLET ORAL ONCE AS NEEDED
Status: COMPLETED | OUTPATIENT
Start: 2023-11-16 | End: 2023-11-16

## 2023-11-16 RX ORDER — LIDOCAINE HYDROCHLORIDE 20 MG/ML
INJECTION, SOLUTION EPIDURAL; INFILTRATION; INTRACAUDAL; PERINEURAL
Status: DISCONTINUED | OUTPATIENT
Start: 2023-11-16 | End: 2023-11-16

## 2023-11-16 RX ORDER — FENTANYL CITRATE 50 UG/ML
25 INJECTION, SOLUTION INTRAMUSCULAR; INTRAVENOUS EVERY 5 MIN PRN
Status: COMPLETED | OUTPATIENT
Start: 2023-11-16 | End: 2023-11-16

## 2023-11-16 RX ADMIN — SUGAMMADEX 200 MG: 100 INJECTION, SOLUTION INTRAVENOUS at 11:11

## 2023-11-16 RX ADMIN — MIDAZOLAM HYDROCHLORIDE 1 MG: 1 INJECTION, SOLUTION INTRAMUSCULAR; INTRAVENOUS at 09:11

## 2023-11-16 RX ADMIN — FENTANYL CITRATE 25 MCG: 50 INJECTION INTRAMUSCULAR; INTRAVENOUS at 12:11

## 2023-11-16 RX ADMIN — ONDANSETRON 4 MG: 2 INJECTION INTRAMUSCULAR; INTRAVENOUS at 11:11

## 2023-11-16 RX ADMIN — ROCURONIUM BROMIDE 20 MG: 10 INJECTION, SOLUTION INTRAVENOUS at 10:11

## 2023-11-16 RX ADMIN — HYDROCODONE BITARTRATE AND ACETAMINOPHEN 1 TABLET: 5; 325 TABLET ORAL at 12:11

## 2023-11-16 RX ADMIN — FENTANYL CITRATE 25 MCG: 50 INJECTION, SOLUTION INTRAMUSCULAR; INTRAVENOUS at 10:11

## 2023-11-16 RX ADMIN — FENTANYL CITRATE 25 MCG: 50 INJECTION, SOLUTION INTRAMUSCULAR; INTRAVENOUS at 11:11

## 2023-11-16 RX ADMIN — CEFAZOLIN SODIUM 2 G: 2 SOLUTION INTRAVENOUS at 09:11

## 2023-11-16 RX ADMIN — SODIUM CHLORIDE, SODIUM GLUCONATE, SODIUM ACETATE, POTASSIUM CHLORIDE, MAGNESIUM CHLORIDE, SODIUM PHOSPHATE, DIBASIC, AND POTASSIUM PHOSPHATE: .53; .5; .37; .037; .03; .012; .00082 INJECTION, SOLUTION INTRAVENOUS at 10:11

## 2023-11-16 RX ADMIN — SCOLOPAMINE TRANSDERMAL SYSTEM 1 PATCH: 1 PATCH, EXTENDED RELEASE TRANSDERMAL at 09:11

## 2023-11-16 RX ADMIN — ACETAMINOPHEN 1000 MG: 10 INJECTION, SOLUTION INTRAVENOUS at 10:11

## 2023-11-16 RX ADMIN — SODIUM CHLORIDE, SODIUM GLUCONATE, SODIUM ACETATE, POTASSIUM CHLORIDE, MAGNESIUM CHLORIDE, SODIUM PHOSPHATE, DIBASIC, AND POTASSIUM PHOSPHATE: .53; .5; .37; .037; .03; .012; .00082 INJECTION, SOLUTION INTRAVENOUS at 08:11

## 2023-11-16 RX ADMIN — PROPOFOL 150 MG: 10 INJECTION, EMULSION INTRAVENOUS at 09:11

## 2023-11-16 RX ADMIN — DEXAMETHASONE SODIUM PHOSPHATE 4 MG: 4 INJECTION, SOLUTION INTRA-ARTICULAR; INTRALESIONAL; INTRAMUSCULAR; INTRAVENOUS; SOFT TISSUE at 09:11

## 2023-11-16 RX ADMIN — KETOROLAC TROMETHAMINE 30 MG: 30 INJECTION, SOLUTION INTRAMUSCULAR; INTRAVENOUS at 11:11

## 2023-11-16 RX ADMIN — ONDANSETRON 4 MG: 2 INJECTION INTRAMUSCULAR; INTRAVENOUS at 09:11

## 2023-11-16 RX ADMIN — LIDOCAINE HYDROCHLORIDE 100 MG: 20 INJECTION, SOLUTION EPIDURAL; INFILTRATION; INTRACAUDAL at 09:11

## 2023-11-16 RX ADMIN — FENTANYL CITRATE 25 MCG: 50 INJECTION INTRAMUSCULAR; INTRAVENOUS at 11:11

## 2023-11-16 RX ADMIN — FENTANYL CITRATE 50 MCG: 50 INJECTION, SOLUTION INTRAMUSCULAR; INTRAVENOUS at 10:11

## 2023-11-16 RX ADMIN — ROCURONIUM BROMIDE 50 MG: 10 INJECTION, SOLUTION INTRAVENOUS at 09:11

## 2023-11-16 RX ADMIN — FENTANYL CITRATE 75 MCG: 50 INJECTION, SOLUTION INTRAMUSCULAR; INTRAVENOUS at 09:11

## 2023-11-16 NOTE — DISCHARGE SUMMARY
Discharge Summary  General Surgery      Admit Date: 11/16/2023    Discharge Date :11/16/2023    Attending Physician: Nj Penaloza     Discharge Physician: Nj Penaloza    Discharged Condition: good    Discharge Diagnosis: Incisional hernia, without obstruction or gangrene [K43.2]    Treatments/Procedures: Procedure(s) (LRB):  ROBOTIC REPAIR, HERNIA, INCISIONAL (N/A)    Hospital Course: Uneventful; Discharged home from Recovery    Significant Diagnostic Studies: none    Disposition: Home or Self Care    Diet: Regular    Follow up: Office 10-14 days    Activity: No heavy lifting till seen in office.    Patient Instructions:   Current Discharge Medication List        CONTINUE these medications which have NOT CHANGED    Details   amLODIPine (NORVASC) 2.5 MG tablet Take 1 tablet (2.5 mg total) by mouth once daily.  Qty: 90 tablet, Refills: 1    Comments: .  Associated Diagnoses: Hypertension, unspecified type      cetirizine 10 mg chewable tablet Take 10 mg by mouth once daily.      cyanocobalamin, vitamin B-12, 5,000 mcg TbDL Take 5,000 Units by mouth once daily.       icosapent ethyL (VASCEPA) 1 gram Cap TAKE 2 CAPSULES (2 GRAMS) TWICE A DAY  Qty: 360 capsule, Refills: 1    Associated Diagnoses: Pure hypertriglyceridemia      magnesium oxide 500 mg Cap Take 1 capsule by mouth once daily.      nebivoloL (BYSTOLIC) 5 MG Tab Take 1 tablet (5 mg total) by mouth once daily.  Qty: 90 tablet, Refills: 1    Associated Diagnoses: Hypertension, unspecified type      nystatin-triamcinolone (MYCOLOG II) cream Apply topically 2 (two) times daily.  Qty: 60 g, Refills: 2    Associated Diagnoses: Yeast dermatitis      progesterone (PROMETRIUM) 200 MG capsule Take 200 mg by mouth every evening.      salmeteroL (SEREVENT) 50 mcg/dose diskus inhaler Inhale 1 puff into the lungs 2 (two) times daily. Controller  Qty: 3 each, Refills: 3    Associated Diagnoses: Mild intermittent asthma without complication       triamterene-hydrochlorothiazide 37.5-25 mg (MAXZIDE-25) 37.5-25 mg per tablet Take 1 tablet by mouth once daily.  Qty: 90 tablet, Refills: 1    Comments: .  Associated Diagnoses: Hypertension, unspecified type      albuterol (PROVENTIL/VENTOLIN HFA) 90 mcg/actuation inhaler Inhale 1-2 puffs into the lungs every 4 to 6 hours as needed for Wheezing or Shortness of Breath. Rescue  Qty: 18 g, Refills: 3    Associated Diagnoses: Mild intermittent asthma without complication      aspirin (ECOTRIN) 81 MG EC tablet Take 81 mg by mouth once daily.      cholecalciferol, vitamin D3, 125 mcg (5,000 unit) Tab 5,000 Units once daily.       ketorolac 0.4% (ACULAR) 0.4 % Drop Place 1 drop into both eyes 4 (four) times daily.      LUCENTIS 0.5 mg/0.05 mL Syrg injection       multivitamin capsule Take 1 capsule by mouth once daily.      phentermine (ADIPEX-P) 37.5 mg tablet Take 1 tablet (37.5 mg total) by mouth once daily.  Qty: 90 tablet, Refills: 0    Associated Diagnoses: Class 1 obesity due to excess calories without serious comorbidity with body mass index (BMI) of 30.0 to 30.9 in adult      turmeric root extract 500 mg Cap Take 500 mg by mouth once daily.              Discharge Procedure Orders   Diet general

## 2023-11-16 NOTE — PROGRESS NOTES
SUBJECTIVE:      Patient ID: Daniela Lawson is a 58 y.o. female.    Chief Complaint: Annual Exam (Annual/Well Exam -declined flu vaccine)      Presents for well exam & lab orders    Discussed outstanding health maintenance        Past Surgical History:   Procedure Laterality Date     SECTION  1990    CHOLECYSTECTOMY      HYSTERECTOMY      INSERTION OF URETERAL CATHETER N/A 10/16/2019    Procedure: INSERTION, CATHETER, URETER;  Surgeon: Willard Ruffin MD;  Location: [facility] OR;  Service: Urology;  Laterality: N/A;    LAPAROSCOPIC SIGMOIDECTOMY N/A 10/16/2019    Procedure: COLECTOMY, SIGMOID, LAPAROSCOPIC;  Surgeon: Doug Gifford MD;  Location: [facility] OR;  Service: General;  Laterality: N/A;    SHOULDER ARTHROSCOPY Bilateral     STOMACH SURGERY      Possible Nissen per pt- To treat acid reflux     TUBAL LIGATION  1990     Family History   Problem Relation Age of Onset    Coronary artery disease Mother     Hyperlipidemia Mother     Other Mother         Breast Nodules    Arthritis Mother     Hypertension Father     Atrial fibrillation Father     Neuropathy Father     Other Father         Bypass    Diabetes Father     Hypertension Sister     Cancer Sister         Breast Cacner    Neuropathy Sister     Breast cancer Sister     Hypertension Daughter     No Known Problems Son     Diabetes Maternal Grandmother     Hypertension Maternal Grandmother     No Known Problems Maternal Grandfather     Coronary artery disease Paternal Grandmother     Cancer Paternal Grandmother         Breast Cancer    Heart attack Paternal Grandmother     Breast cancer Paternal Grandmother     Dementia Paternal Grandfather     Heart attack Paternal Grandfather     No Known Problems Sister     Other Daughter         Benign Hypertension(Brain)    Breast cancer Maternal Aunt     Breast cancer Paternal Aunt       Social History     Socioeconomic History    Marital status:     Number of children: 3   Tobacco Use     Smoking status: Never    Smokeless tobacco: Never   Substance and Sexual Activity    Alcohol use: Yes     Alcohol/week: 3.0 standard drinks of alcohol     Types: 3 Glasses of wine per week     Comment: 3 glasses of wine per week     Drug use: Never     Social Determinants of Health     Financial Resource Strain: Low Risk  (11/13/2023)    Overall Financial Resource Strain (CARDIA)     Difficulty of Paying Living Expenses: Not very hard   Food Insecurity: No Food Insecurity (11/13/2023)    Hunger Vital Sign     Worried About Running Out of Food in the Last Year: Never true     Ran Out of Food in the Last Year: Never true   Transportation Needs: No Transportation Needs (11/13/2023)    PRAPARE - Transportation     Lack of Transportation (Medical): No     Lack of Transportation (Non-Medical): No   Physical Activity: Sufficiently Active (11/13/2023)    Exercise Vital Sign     Days of Exercise per Week: 5 days     Minutes of Exercise per Session: 50 min   Stress: Stress Concern Present (11/13/2023)    [employer] - Occupational Stress Questionnaire     Feeling of Stress : To some extent   Social Connections: Unknown (11/13/2023)    Social Connection and Isolation Panel [NHANES]     Frequency of Communication with Friends and Family: More than three times a week     Frequency of Social Gatherings with Friends and Family: More than three times a week     Active Member of Clubs or Organizations: Yes     Attends Club or Organization Meetings: More than 4 times per year     Marital Status:    Housing Stability: Low Risk  (11/13/2023)    Housing Stability Vital Sign     Unable to Pay for Housing in the Last Year: No     Number of Places Lived in the Last Year: 1     Unstable Housing in the Last Year: No     Current Outpatient Medications   Medication Sig Dispense Refill    aspirin (ECOTRIN) 81 MG EC tablet Take 81 mg by mouth once daily.      cetirizine 10 mg chewable tablet Take 10 mg by  mouth once daily.      cholecalciferol, vitamin D3, 125 mcg (5,000 unit) Tab 5,000 Units once daily.       cyanocobalamin, vitamin B-12, 5,000 mcg TbDL Take 5,000 Units by mouth once daily.       icosapent ethyL (VASCEPA) 1 gram Cap TAKE 2 CAPSULES (2 GRAMS) TWICE A  capsule 1    ketorolac 0.4% (ACULAR) 0.4 % Drop Place 1 drop into both eyes 4 (four) times daily.      LUCENTIS 0.5 mg/0.05 mL Syrg injection       magnesium oxide 500 mg Cap Take 1 capsule by mouth once daily.      multivitamin capsule Take 1 capsule by mouth once daily.      nystatin-triamcinolone (MYCOLOG II) cream Apply topically 2 (two) times daily. 60 g 2    progesterone (PROMETRIUM) 200 MG capsule Take 200 mg by mouth every evening.      salmeteroL (SEREVENT) 50 mcg/dose diskus inhaler Inhale 1 puff into the lungs 2 (two) times daily. Controller (Patient taking differently: Inhale 1 puff into the lungs 2 (two) times daily. Controller   PRN) 3 each 3    turmeric root extract 500 mg Cap Take 500 mg by mouth once daily.       albuterol (PROVENTIL/VENTOLIN HFA) 90 mcg/actuation inhaler Inhale 1-2 puffs into the lungs every 4 to 6 hours as needed for Wheezing or Shortness of Breath. Rescue 18 g 3    amLODIPine (NORVASC) 2.5 MG tablet Take 1 tablet (2.5 mg total) by mouth once daily. 90 tablet 1    HYDROcodone-acetaminophen (NORCO) 5-325 mg per tablet Take 1 tablet by mouth every 6 (six) hours as needed for Pain. 10 tablet 0    nebivoloL (BYSTOLIC) 5 MG Tab Take 1 tablet (5 mg total) by mouth once daily. 90 tablet 1    phentermine (ADIPEX-P) 37.5 mg tablet Take 1 tablet (37.5 mg total) by mouth once daily. (Patient not taking: Reported on 11/15/2023) 90 tablet 0    triamterene-hydrochlorothiazide 37.5-25 mg (MAXZIDE-25) 37.5-25 mg per tablet Take 1 tablet by mouth once daily. 90 tablet 1     No current facility-administered medications for this visit.     Review of patient's allergies indicates:   Allergen Reactions    Meperidine Other (See  Comments)     MAKES HER VERY EVIL    Oxycodone-acetaminophen Itching    Adhesive Hives    Lisinopril Other (See Comments)     Chest pain    Meloxicam Other (See Comments)     LEGS SWELL      Past Medical History:   Diagnosis Date    Anesthesia 2009    Pt reports severe drop in BP when anesthesia given.    Arthritis     Asthma     Diverticulitis     GERD (gastroesophageal reflux disease)     Hypertension     Neuropathy     PONV (postoperative nausea and vomiting)     Stroke     RETINACULAR     Wears glasses      Past Surgical History:   Procedure Laterality Date     SECTION  1990    CHOLECYSTECTOMY      HYSTERECTOMY      INSERTION OF URETERAL CATHETER N/A 10/16/2019    Procedure: INSERTION, CATHETER, URETER;  Surgeon: Willard Ruffin MD;  Location: [facility];  Service: Urology;  Laterality: N/A;    LAPAROSCOPIC SIGMOIDECTOMY N/A 10/16/2019    Procedure: COLECTOMY, SIGMOID, LAPAROSCOPIC;  Surgeon: Doug Gifford MD;  Location: [facility];  Service: General;  Laterality: N/A;    SHOULDER ARTHROSCOPY Bilateral     STOMACH SURGERY      Possible Nissen per pt- To treat acid reflux     TUBAL LIGATION  1990       Review of Systems   Constitutional:  Negative for activity change, appetite change, fatigue and unexpected weight change.        Feels her cardiac issues & fatigue started s/p Covid   HENT:  Negative for congestion, ear pain, hearing loss, postnasal drip, rhinorrhea, sinus pressure, sinus pain, sneezing, sore throat and trouble swallowing.    Eyes:  Negative for photophobia, pain, discharge and visual disturbance.   Respiratory:  Negative for cough, chest tightness, shortness of breath and wheezing.    Cardiovascular:  Negative for chest pain, palpitations and leg swelling.        Follows with cardiology   Gastrointestinal:  Negative for abdominal distention, abdominal pain, blood in stool, constipation, diarrhea, nausea and vomiting.        Scheduled for hernia repair   Endocrine:  "Negative for cold intolerance, heat intolerance, polydipsia and polyuria.   Genitourinary:  Negative for difficulty urinating, dysuria, flank pain, frequency, hematuria, menstrual problem, pelvic pain and urgency.   Musculoskeletal:  Positive for arthralgias. Negative for back pain, joint swelling, myalgias and neck pain.   Skin:  Negative for pallor.   Allergic/Immunologic: Negative for environmental allergies and food allergies.   Neurological:  Negative for dizziness, weakness, light-headedness, numbness and headaches.   Hematological:  Does not bruise/bleed easily.   Psychiatric/Behavioral:  Negative for agitation, confusion, decreased concentration, dysphoric mood and sleep disturbance. The patient is not nervous/anxious.       OBJECTIVE:      Vitals:    11/15/23 0950   BP: 116/72   BP Location: Right arm   Patient Position: Sitting   BP Method: Large (Manual)   Pulse: 66   SpO2: 97%   Weight: 78.2 kg (172 lb 4.8 oz)   Height: 5' 4" (1.626 m)     Physical Exam  Vitals and nursing note reviewed.   Constitutional:       General: She is not in acute distress.     Appearance: Normal appearance. She is well-developed, well-groomed and overweight.      Comments: 6 lb loss since August visit   HENT:      Head: Normocephalic and atraumatic.      Right Ear: Hearing normal.      Left Ear: Hearing normal.      Nose: Nose normal. No rhinorrhea.   Eyes:      General: Lids are normal.         Right eye: No discharge.         Left eye: No discharge.      Conjunctiva/sclera: Conjunctivae normal.      Right eye: Right conjunctiva is not injected.      Left eye: Left conjunctiva is not injected.      Pupils: Pupils are equal, round, and reactive to light. Pupils are equal.      Right eye: Pupil is round and reactive.      Left eye: Pupil is round and reactive.   Neck:      Thyroid: No thyromegaly.      Vascular: No JVD.      Trachea: Trachea normal. No tracheal deviation.   Cardiovascular:      Rate and Rhythm: Normal rate and " regular rhythm.      Pulses:           Radial pulses are 2+ on the right side and 2+ on the left side.      Heart sounds: Normal heart sounds. No murmur heard.     No friction rub. No gallop.   Pulmonary:      Effort: Pulmonary effort is normal. No respiratory distress.      Breath sounds: Normal breath sounds. No stridor. No decreased breath sounds, wheezing, rhonchi or rales.   Abdominal:      General: Bowel sounds are normal. There is no distension.      Palpations: Abdomen is soft. Abdomen is not rigid.      Tenderness: There is no abdominal tenderness. There is no guarding.   Musculoskeletal:         General: Normal range of motion.      Cervical back: Normal range of motion and neck supple.   Lymphadenopathy:      Cervical: No cervical adenopathy.   Skin:     General: Skin is warm and dry.      Capillary Refill: Capillary refill takes less than 2 seconds.      Coloration: Skin is not pale.      Findings: No lesion or rash.   Neurological:      Mental Status: She is alert and oriented to person, place, and time.      Motor: No atrophy.      Coordination: Coordination normal.      Gait: Gait normal.   Psychiatric:         Attention and Perception: She is attentive.         Speech: Speech normal.         Behavior: Behavior normal.         Thought Content: Thought content normal.         Judgment: Judgment normal.        Assessment:       1. Routine health maintenance        Plan:       Routine health maintenance  -     Lipid Panel; Future; Expected date: 11/15/2023  -     TSH; Future; Expected date: 11/15/2023  - will call with results of labs  - appears stable on current med regimen  - chronic meds refilled in separate encounter  - continue to follow with appropriate specialists PRN  - Limit: red meat, butter, fried foods, cheese, and other foods that have a lot of saturated fat. Consume: lean meats, fish, fruits, vegetables, whole grains, beans, lentils, and nuts. Continued weight loss and adequate daily  hydration.  - Patient counseled on age appropriate medical preventive services, age appropriate cancer screenings, nutrition, healthy diet, consistent exercise regimen and maintaining an active lifestyle.  - Instructed on guidelines recommending 150 minutes per week of brisk exercise and healthy lifestyle. Recommended well screenings (including immunizations) reviewed with patient. Discussed outstanding health maintenance and rationale for completion.              Follow up in about 1 year (around 11/15/2024) for well visit.      11/16/2023 CHITO Shea, FNP-C

## 2023-11-16 NOTE — DISCHARGE INSTRUCTIONS
Post op instructions for prevention of DVT  What is deep vein thrombosis?  Deep vein thrombosis (DVT) is the medical term for blood clots in the deep veins of the leg.  These blood clots can be dangerous.  A DVT can block a blood vessel and keep blood from getting where it needs to go.  Another problem is that the clot can travel to other parts of the body such as the lungs.  A clot that travels to the lungs is called a pulmonary embolus (PE) and can cause serious problems with breathing which can lead to death.  Am I at risk for DVT/PE?  If you are not very active, you are at risk of DVT.  Anyone confined to bed, sitting for long periods of time, recovering from surgery, etc. increases the risk of DVT.  Other risk factors are cancer diagnosis, certain medications, estrogen replacement in any form,older age, obesity, pregnancy, smoking, history of clotting disorders, and dehydration.  How will I know if I have a DVT?  Swelling in the lower leg  Pain  Warmth, redness, hardness or bulging of the vein  If you have any of these symptoms, call your doctors office right away.  Some people will not have any symptoms until the clot moves to the lungs.  What are the symptoms of a PE?  Panting, shortness of breath, or trouble breathing  Sharp, knife-like chest pain when you breathe  Coughing or coughing up blood  Rapid heartbeat  If you have any of these symptoms or get worse quickly, call 911 for emergency treatment.  How can I prevent a DVT?  Avoid long periods of inactivity and dont cross your legs--get up and walk around every hour or so.  Stay active--walking after surgery is highly encouraged.  This means you should get out of the house and walk in the neighborhood.  Going up and down stairs will not impair healing (unless advised against such activity by your doctor).    Drink plenty of noncaffeinated, nonalcoholic fluids each day to prevent dehydration.  Wear special support stockings, if they have been advised by  "your doctor.  If you travel, stop at least once an hour and walk around.  Avoid smoking (assistance with stopping is available through your healthcare provider)  Always notify your doctor if you are not able to follow the post operative instructions that are given to you at the time of discharge.  It may be necessary to prescribe one of the medications available to prevent DVT.We hope your stay was comfortable as you heal now, mend and rest.    For we have enjoyed taking care of you by giving your our best.    And as you get better, by regaining your health and strength;   We count it as a privilege to have served you and hope your time at [facility] was well spent.      Thank  You!!!                    Discharge Instructions: After Your Surgery/Procedure  Youve just had surgery. During surgery you were given medicine called anesthesia to keep you relaxed and free of pain. After surgery you may have some pain or nausea. This is common. Here are some tips for feeling better and getting well after surgery.     Stay on schedule with your medication.   Going home  Your doctor or nurse will show you how to take care of yourself when you go home. He or she will also answer your questions. Have an adult family member or friend drive you home.      For your safety we recommend these precaution for the first 24 hours after your procedure:  Do not drive or use heavy equipment.  Do not make important decisions or sign legal papers.  Do not drink alcohol.  Have someone stay with you, if needed. He or she can watch for problems and help keep you safe.  Your concentration, balance, coordination, and judgement may be impaired for many hours after anesthesia.  Use caution when ambulating or standing up.     You may feel weak and "washed out" after anesthesia and surgery.      Subtle residual effects of general anesthesia or sedation with regional / local anesthesia can last more than 24 hours.  Rest for the remainder of the day or " longer if your Doctor/Surgeon has advised you to do so.  Although you may feel normal within the first 24 hours, your reflexes and mental ability may be impaired without you realizing it.  You may feel dizzy, lightheaded or sleepy for 24 hours or longer.      Be sure to go to all follow-up visits with your doctor. And rest after your surgery for as long as your doctor tells you to.  Coping with pain  If you have pain after surgery, pain medicine will help you feel better. Take it as told, before pain becomes severe. Also, ask your doctor or pharmacist about other ways to control pain. This might be with heat, ice, or relaxation. And follow any other instructions your surgeon or nurse gives you.  Tips for taking pain medicine  To get the best relief possible, remember these points:  Pain medicines can upset your stomach. Taking them with a little food may help.  Most pain relievers taken by mouth need at least 20 to 30 minutes to start to work.  Taking medicine on a schedule can help you remember to take it. Try to time your medicine so that you can take it before starting an activity. This might be before you get dressed, go for a walk, or sit down for dinner.  Constipation is a common side effect of pain medicines. Call your doctor before taking any medicines such as laxatives or stool softeners to help ease constipation. Also ask if you should skip any foods. Drinking lots of fluids and eating foods such as fruits and vegetables that are high in fiber can also help. Remember, do not take laxatives unless your surgeon has prescribed them.  Drinking alcohol and taking pain medicine can cause dizziness and slow your breathing. It can even be deadly. Do not drink alcohol while taking pain medicine.  Pain medicine can make you react more slowly to things. Do not drive or run machinery while taking pain medicine.  Your health care provider may tell you to take acetaminophen to help ease your pain. Ask him or her how  much you are supposed to take each day. Acetaminophen or other pain relievers may interact with your prescription medicines or other over-the-counter (OTC) drugs. Some prescription medicines have acetaminophen and other ingredients. Using both prescription and OTC acetaminophen for pain can cause you to overdose. Read the labels on your OTC medicines with care. This will help you to clearly know the list of ingredients, how much to take, and any warnings. It may also help you not take too much acetaminophen. If you have questions or do not understand the information, ask your pharmacist or health care provider to explain it to you before you take the OTC medicine.  Managing nausea  Some people have an upset stomach after surgery. This is often because of anesthesia, pain, or pain medicine, or the stress of surgery. These tips will help you handle nausea and eat healthy foods as you get better. If you were on a special food plan before surgery, ask your doctor if you should follow it while you get better. These tips may help:  Do not push yourself to eat. Your body will tell you when to eat and how much.  Start off with clear liquids and soup. They are easier to digest.  Next try semi-solid foods, such as mashed potatoes, applesauce, and gelatin, as you feel ready.  Slowly move to solid foods. Dont eat fatty, rich, or spicy foods at first.  Do not force yourself to have 3 large meals a day. Instead eat smaller amounts more often.  Take pain medicines with a small amount of solid food, such as crackers or toast, to avoid nausea.     Call your surgeon if  You still have pain an hour after taking medicine. The medicine may not be strong enough.  You feel too sleepy, dizzy, or groggy. The medicine may be too strong.  You have side effects like nausea, vomiting, or skin changes, such as rash, itching, or hives.       If you have obstructive sleep apnea  You were given anesthesia medicine during surgery to keep you  comfortable and free of pain. After surgery, you may have more apnea spells because of this medicine and other medicines you were given. The spells may last longer than usual.   At home:  Keep using the continuous positive airway pressure (CPAP) device when you sleep. Unless your health care provider tells you not to, use it when you sleep, day or night. CPAP is a common device used to treat obstructive sleep apnea.  Talk with your provider before taking any pain medicine, muscle relaxants, or sedatives. Your provider will tell you about the possible dangers of taking these medicines.  © 4557-1860 [employer]. [address] [place] [id number]. All rights reserved. This information is not intended as a substitute for professional medical care. Always follow your healthcare professional's instructions.                      Using an Incentive Spirometer    An incentive spirometer is a device that helps you do deep breathing exercises. These exercises expand your lungs, aid in circulation, and help prevent pneumonia. Deep breathing exercises also help you breathe better and improve the function of your lungs by:  Keeping your lungs clear  Strengthening your breathing muscles  Helping prevent respiratory complications or problems  The incentive spirometer gives you a way to take an active part in recover. A nurse or therapist will teach you breathing exercises. To do these exercises, you will breathe in through your mouth and not your nose. The incentive spirometer only works correctly if you breathe in through your mouth.  Steps to clear lungs  Step 1. Exhale normally. Then, inhale normally.  Relax and breathe out.  Step 2. Place your lips tightly around the mouthpiece.  Make sure the device is upright and not tilted.  Step 3. Inhale as much air as you can through the mouthpiece (don't breath through your nose).  Inhale slowly and deeply.  Hold your breath long enough to keep the balls or disk  raised for at least 3 to 5 seconds, or as instructed by your healthcare provider.  Some spirometers have an indicator to let you know that you are breathing in too fast. If the indicator goes off, breathe in more slowly.  Step 4. Repeat the exercise regularly.  Do this exercise every hour while you're awake, or as instructed by your healthcare provider.  If you were taught deep breathing and coughing exercises, do them regularly as instructed by your healthcare provider.                     Exparel(bupivacaine) has been injected to provide approximately 72 hours of reduced pain after your surgery.  Do not remove the bracelet for five days.  Report to your doctor as soon as possible if you experience any of the following:   Restlessness   Anxiety   Speech problems    Lightheadedness   Numbness and tingling of the mouth and lips   Seizures    Metallic taste   Blurred vision   Tremors    Twitching   Depression   Extreme drowsiness  Avoid additional use of local anesthetics (such as dental procedures) for five days (96 hours).

## 2023-11-16 NOTE — PLAN OF CARE
Discharge instructions given to pt and family/friend, verbalized understanding and questions answered. Handouts provided. Belongings given back to pt. IV removed- catheter intact. Discharge via wheelchair.

## 2023-11-16 NOTE — OP NOTE
Patient: Daniela Lawson     Date of Procedure: 11/16/2023    Procedure:  Robotic incisional hernia repair with mesh implantation.    Surgeon: Nj Travis MD    Assistant: Rashmi Caceres    Pre-op Diagnosis: Incisional hernia, without obstruction or gangrene [K43.2]     Post-op Diagnosis: Incisional hernia, without obstruction or gangrene [K43.2]    Findings:  Incisional hernia    Specimen:  None    EBL: 10 cc    Complications: None      Procedure in detail:      After appropriate consent was obtained, consent forms signed and questions answered, the operative site was marked and the patient was taken to the operating room.  The patient was positioned and general anesthesia was induced. Pre-operative antibiotic was administered. Time out procedure was then performed with the members of the surgical team. The operative field was then prepped and draped in the usual sterile fashion.      Skin incision was made in the right upper quadrant and access was obtained to the abdominal cavity with a 5 mm Visiport without injury.  The hernia was identified.  There were numerous omental adhesions and small bowel adhesions to the area of the hernia.  Two trocars were placed under direct vision in the Visiport was replaced with an 8 mm robotic trocar in the robot was docked.  Dissection was then performed lysing adhesions to free up small bowel from the edge of the hernia defect.  Omentum was removed from the hernia defect as well.  No bowel injury occurred.  The defect was then closed with a running 0 V lock suture after the abdominal pressure had been lowered to 8 mm.  A 15 cm round parietene mesh was then selected and placed into the abdominal cavity and secured to the abdominal wall with a 2-0 V lock suture.  This was then continued with 3 double-arm 2-0 Stratafix sutures around the edges of the mesh and concentrically within the central portion of the mesh securing the mesh to the abdominal wall completely.  There was  good overlap of the closed defect.  30 cc of combined Exparel and Marcaine were injected circumferentially around the outer edges of the mesh as a block under direct vision.  The trocars were then removed.  The skin was closed with 4-0 Monocryl subcuticular stitches after injection with local anesthetic.     Steri-Strips and dressings were  applied.  The patient was then awakened, extubated, and transferred to the recovery room in stable condition.  The procedure was tolerated without complication.

## 2023-11-16 NOTE — CARE UPDATE
11/16/23 0829   Incentive Spirometer   $ Incentive Spirometer Charges done with encouragement   Incentive Spirometer Predicted Level (mL) 1680   Administration (IS) instruction provided, initial   Number of Repetitions (IS) 7   Level Incentive Spirometer (mL) 2500   Patient Tolerance (IS) good;no adverse signs/symptoms present

## 2023-11-16 NOTE — ANESTHESIA PROCEDURE NOTES
Intubation    Date/Time: 11/16/2023 9:49 AM    Performed by: Bee Dan CRNA  Authorized by: Jose Lewis MD    Intubation:     Induction:  Intravenous    Intubated:  Postinduction    Mask Ventilation:  Easy mask    Attempts:  1    Attempted By:  CRNA and student    Method of Intubation:  Video laryngoscopy    Blade:  Leyva 3    Laryngeal View Grade: Grade I - full view of cords      Difficult Airway Encountered?: No      Complications:  None    Airway Device:  Oral endotracheal tube    Airway Device Size:  7.5    Style/Cuff Inflation:  Cuffed (inflated to minimal occlusive pressure)    Tube secured:  22    Secured at:  The lips    Placement Verified By:  Capnometry    Complicating Factors:  None    Findings Post-Intubation:  BS equal bilateral and atraumatic/condition of teeth unchanged

## 2023-11-16 NOTE — PLAN OF CARE
VSS. NAD. Resp E/U. Calm and cooperative. Speech appropriate. Tolerating clear liquids. Denies nausea. Pain controlled. IV patent and infusing. No swelling or redness. Incision x3 to abdomen with steristrips CDI. Due to void. Family notified of patient status. All safety measures taken. Bed in low position. Bedrails up x2. Bed locked. Cleared by Anesthesia.

## 2023-11-16 NOTE — ANESTHESIA POSTPROCEDURE EVALUATION
Anesthesia Post Evaluation    Patient: Daniela Lawson    Procedure(s) Performed: Procedure(s) (LRB):  ROBOTIC REPAIR, HERNIA, INCISIONAL (N/A)    Final Anesthesia Type: general      Patient location during evaluation: PACU  Patient participation: Yes- Able to Participate  Level of consciousness: awake and alert and oriented  Post-procedure vital signs: reviewed and stable  Pain management: adequate  Airway patency: patent  CHARI mitigation strategies: Multimodal analgesia, Extubation while patient is awake, Verification of full reversal of neuromuscular block and Extubation and recovery carried out in lateral, semiupright, or other nonsupine position  PONV status at discharge: No PONV  Anesthetic complications: no      Cardiovascular status: blood pressure returned to baseline  Respiratory status: unassisted, spontaneous ventilation and room air  Hydration status: euvolemic  Follow-up not needed.          Vitals Value Taken Time   /79 11/16/23 1253   Temp 36.6 11/16/23 1259   Pulse 62 11/16/23 1256   Resp 18 11/16/23 1256   SpO2 98 % 11/16/23 1256   Vitals shown include unvalidated device data.      No case tracking events are documented in the log.      Pain/Patti Score: Pain Rating Prior to Med Admin: 4 (11/16/2023 12:51 PM)  Pain Rating Post Med Admin: 3 (11/16/2023 12:25 PM)  Patti Score: 10 (11/16/2023 12:25 PM)

## 2023-11-16 NOTE — TRANSFER OF CARE
"Anesthesia Transfer of Care Note    Patient: Daniela Lawson    Procedure(s) Performed: Procedure(s) (LRB):  ROBOTIC REPAIR, HERNIA, INCISIONAL (N/A)    Patient location: PACU    Anesthesia Type: general    Transport from OR: Transported from OR on room air with adequate spontaneous ventilation    Post pain: adequate analgesia    Post assessment: no apparent anesthetic complications and tolerated procedure well    Post vital signs: stable    Level of consciousness: awake and responds to stimulation    Nausea/Vomiting: no nausea/vomiting    Complications: none    Transfer of care protocol was followed      Last vitals: Visit Vitals  BP (!) 141/76 (BP Location: Left arm, Patient Position: Lying)   Pulse 71   Temp 36.8 °C (98.2 °F) (Skin)   Resp 20   Ht 5' 4" (1.626 m)   Wt 78.2 kg (172 lb 6.4 oz)   SpO2 98%   Breastfeeding No   BMI 29.59 kg/m²     "

## 2023-11-16 NOTE — ANESTHESIA PREPROCEDURE EVALUATION
11/16/2023  Daniela Lawson is a 58 y.o., female.      Pre-op Assessment    I have reviewed the NPO Status.   I have reviewed the Medications.     Review of Systems  Anesthesia Hx:  No problems with previous Anesthesia                Cardiovascular:  Exercise tolerance: good   Hypertension           hyperlipidemia   ECG has been reviewed.                          Pulmonary:    Asthma                    Hepatic/GI:     GERD             Neurological:   CVA                                    Endocrine:        Obesity / BMI > 30      Physical Exam  General: Well nourished    Airway:  Mallampati: II   Mouth Opening: Normal  TM Distance: Normal  Tongue: Normal  Neck ROM: Normal ROM    Dental:  Intact    Chest/Lungs:  Clear to auscultation, Normal Respiratory Rate        Anesthesia Plan  Type of Anesthesia, risks & benefits discussed:    Anesthesia Type: Gen ETT  Intra-op Monitoring Plan: Standard ASA Monitors  Post Op Pain Control Plan: multimodal analgesia and IV/PO Opioids PRN  Induction:  IV  Airway Plan: Direct, Post-Induction  Informed Consent: Informed consent signed with the Patient and all parties understand the risks and agree with anesthesia plan.  All questions answered.   ASA Score: 3    Ready For Surgery From Anesthesia Perspective.     .

## 2023-11-17 VITALS
HEART RATE: 62 BPM | SYSTOLIC BLOOD PRESSURE: 134 MMHG | OXYGEN SATURATION: 97 % | HEIGHT: 64 IN | TEMPERATURE: 98 F | BODY MASS INDEX: 29.43 KG/M2 | RESPIRATION RATE: 16 BRPM | WEIGHT: 172.38 LBS | DIASTOLIC BLOOD PRESSURE: 79 MMHG

## 2023-11-30 ENCOUNTER — PATIENT MESSAGE (OUTPATIENT)
Dept: SURGERY | Facility: CLINIC | Age: 58
End: 2023-11-30
Payer: COMMERCIAL

## 2023-12-05 ENCOUNTER — OFFICE VISIT (OUTPATIENT)
Dept: SURGERY | Facility: CLINIC | Age: 58
End: 2023-12-05
Payer: COMMERCIAL

## 2023-12-05 VITALS — TEMPERATURE: 97 F | DIASTOLIC BLOOD PRESSURE: 83 MMHG | HEART RATE: 74 BPM | SYSTOLIC BLOOD PRESSURE: 120 MMHG

## 2023-12-05 DIAGNOSIS — Z98.890 POST-OPERATIVE STATE: Primary | ICD-10-CM

## 2023-12-05 PROCEDURE — 1159F PR MEDICATION LIST DOCUMENTED IN MEDICAL RECORD: ICD-10-PCS | Mod: CPTII,S$GLB,, | Performed by: SURGERY

## 2023-12-05 PROCEDURE — 3079F PR MOST RECENT DIASTOLIC BLOOD PRESSURE 80-89 MM HG: ICD-10-PCS | Mod: CPTII,S$GLB,, | Performed by: SURGERY

## 2023-12-05 PROCEDURE — 1160F RVW MEDS BY RX/DR IN RCRD: CPT | Mod: CPTII,S$GLB,, | Performed by: SURGERY

## 2023-12-05 PROCEDURE — 3079F DIAST BP 80-89 MM HG: CPT | Mod: CPTII,S$GLB,, | Performed by: SURGERY

## 2023-12-05 PROCEDURE — 1159F MED LIST DOCD IN RCRD: CPT | Mod: CPTII,S$GLB,, | Performed by: SURGERY

## 2023-12-05 PROCEDURE — 99212 PR OFFICE/OUTPT VISIT, EST, LEVL II, 10-19 MIN: ICD-10-PCS | Mod: S$GLB,,, | Performed by: SURGERY

## 2023-12-05 PROCEDURE — 99212 OFFICE O/P EST SF 10 MIN: CPT | Mod: S$GLB,,, | Performed by: SURGERY

## 2023-12-05 PROCEDURE — 3074F SYST BP LT 130 MM HG: CPT | Mod: CPTII,S$GLB,, | Performed by: SURGERY

## 2023-12-05 PROCEDURE — 3074F PR MOST RECENT SYSTOLIC BLOOD PRESSURE < 130 MM HG: ICD-10-PCS | Mod: CPTII,S$GLB,, | Performed by: SURGERY

## 2023-12-05 PROCEDURE — 1160F PR REVIEW ALL MEDS BY PRESCRIBER/CLIN PHARMACIST DOCUMENTED: ICD-10-PCS | Mod: CPTII,S$GLB,, | Performed by: SURGERY

## 2023-12-05 NOTE — PROGRESS NOTES
POST-OP NOTE    PROCEDURE:  Robotic hernia repair with mesh implantation.    COMPLAINTS: None.  Still sore but getting better every day.    EXAM: Incision well approximated. No drainage. No infection.      IMPRESSION:  Doing well.    PLAN: FU as needed.

## 2024-04-15 DIAGNOSIS — E78.1 PURE HYPERTRIGLYCERIDEMIA: ICD-10-CM

## 2024-04-18 RX ORDER — ICOSAPENT ETHYL 1 G/1
CAPSULE ORAL
Qty: 360 CAPSULE | Refills: 0 | Status: SHIPPED | OUTPATIENT
Start: 2024-04-18

## 2024-04-24 ENCOUNTER — PATIENT MESSAGE (OUTPATIENT)
Dept: FAMILY MEDICINE | Facility: CLINIC | Age: 59
End: 2024-04-24
Payer: COMMERCIAL

## 2024-06-06 DIAGNOSIS — I10 HYPERTENSION, UNSPECIFIED TYPE: ICD-10-CM

## 2024-06-10 RX ORDER — AMLODIPINE BESYLATE 2.5 MG/1
2.5 TABLET ORAL
Qty: 90 TABLET | Refills: 0 | Status: SHIPPED | OUTPATIENT
Start: 2024-06-10

## 2024-06-10 RX ORDER — NEBIVOLOL 5 MG/1
5 TABLET ORAL
Qty: 90 TABLET | Refills: 0 | Status: SHIPPED | OUTPATIENT
Start: 2024-06-10

## 2024-07-15 DIAGNOSIS — I10 HYPERTENSION, UNSPECIFIED TYPE: ICD-10-CM

## 2024-07-15 NOTE — TELEPHONE ENCOUNTER
Patient last seen: 11/15/23  Scheduled to be seen: 11/21/24  Medication last filled: 11/15/23    Medication pended

## 2024-07-17 ENCOUNTER — OFFICE VISIT (OUTPATIENT)
Dept: URGENT CARE | Facility: CLINIC | Age: 59
End: 2024-07-17
Payer: COMMERCIAL

## 2024-07-17 VITALS
BODY MASS INDEX: 28.67 KG/M2 | RESPIRATION RATE: 18 BRPM | OXYGEN SATURATION: 99 % | WEIGHT: 167 LBS | TEMPERATURE: 98 F | HEART RATE: 75 BPM | SYSTOLIC BLOOD PRESSURE: 142 MMHG | DIASTOLIC BLOOD PRESSURE: 88 MMHG

## 2024-07-17 DIAGNOSIS — M79.644 THUMB PAIN, RIGHT: ICD-10-CM

## 2024-07-17 DIAGNOSIS — M25.541 ARTHRALGIA OF RIGHT HAND: Primary | ICD-10-CM

## 2024-07-17 PROCEDURE — 99204 OFFICE O/P NEW MOD 45 MIN: CPT | Mod: S$GLB,,,

## 2024-07-17 PROCEDURE — 73130 X-RAY EXAM OF HAND: CPT | Mod: RT,S$GLB,, | Performed by: RADIOLOGY

## 2024-07-17 NOTE — PATIENT INSTRUCTIONS
Rest  ICE  Elevate  Compression with ACE wrap or wrist brace    NSAIDs for relief of pain and inflammation    Avoid aggravating affected area.

## 2024-07-17 NOTE — PROGRESS NOTES
Subjective:      Patient ID: Daniela Lawson is a 59 y.o. female.    Vitals:  weight is 75.8 kg (167 lb). Her temperature is 98.1 °F (36.7 °C). Her blood pressure is 142/88 (abnormal) and her pulse is 75. Her respiration is 18 and oxygen saturation is 99%.     Chief Complaint: Finger Pain    Patient woke up with right thumb pain this morning. No trauma or injury.        Constitution: Positive for activity change. Negative for fever.   HENT: Negative.     Cardiovascular: Negative.    Respiratory: Negative.     Gastrointestinal: Negative.    Musculoskeletal:  Positive for pain. Negative for trauma.   Neurological: Negative.    Psychiatric/Behavioral: Negative.        Objective:     Physical Exam   Constitutional: She is oriented to person, place, and time. No distress. normal  HENT:   Head: Normocephalic and atraumatic.   Ears:   Right Ear: External ear normal.   Left Ear: External ear normal.   Nose: Nose normal.   Mouth/Throat: Mucous membranes are moist. Oropharynx is clear.   Eyes: Conjunctivae are normal.   Cardiovascular: Normal rate.   Pulmonary/Chest: Effort normal. No respiratory distress.   Abdominal: Normal appearance.   Musculoskeletal:         General: Tenderness present. No swelling, deformity or signs of injury.        Hands:    Neurological: She is alert and oriented to person, place, and time. She displays no weakness.   Skin: Skin is warm and dry. Capillary refill takes less than 2 seconds. No bruising   Psychiatric: Her behavior is normal. Mood, judgment and thought content normal.   Nursing note and vitals reviewed.      Assessment:     1. Arthralgia of right hand    2. Thumb pain, right        Plan:       Arthralgia of right hand    Thumb pain, right  -     XR HAND COMPLETE 3 VIEW RIGHT; Future; Expected date: 07/17/2024      Discussed medication with patient who acknowledges understanding and is agreeable to POC. Follow up with primary care. Increase fluid intake. Red flags for ER  discussed.

## 2024-07-18 ENCOUNTER — OFFICE VISIT (OUTPATIENT)
Dept: FAMILY MEDICINE | Facility: CLINIC | Age: 59
End: 2024-07-18
Payer: COMMERCIAL

## 2024-07-18 VITALS
HEART RATE: 73 BPM | BODY MASS INDEX: 29.82 KG/M2 | SYSTOLIC BLOOD PRESSURE: 136 MMHG | TEMPERATURE: 98 F | DIASTOLIC BLOOD PRESSURE: 82 MMHG | WEIGHT: 173.69 LBS

## 2024-07-18 DIAGNOSIS — M25.531 RIGHT WRIST PAIN: Primary | ICD-10-CM

## 2024-07-18 DIAGNOSIS — I10 HYPERTENSION, UNSPECIFIED TYPE: ICD-10-CM

## 2024-07-18 PROCEDURE — 3079F DIAST BP 80-89 MM HG: CPT | Mod: CPTII,S$GLB,, | Performed by: NURSE PRACTITIONER

## 2024-07-18 PROCEDURE — 1159F MED LIST DOCD IN RCRD: CPT | Mod: CPTII,S$GLB,, | Performed by: NURSE PRACTITIONER

## 2024-07-18 PROCEDURE — 3008F BODY MASS INDEX DOCD: CPT | Mod: CPTII,S$GLB,, | Performed by: NURSE PRACTITIONER

## 2024-07-18 PROCEDURE — 3075F SYST BP GE 130 - 139MM HG: CPT | Mod: CPTII,S$GLB,, | Performed by: NURSE PRACTITIONER

## 2024-07-18 PROCEDURE — 99213 OFFICE O/P EST LOW 20 MIN: CPT | Mod: S$GLB,,, | Performed by: NURSE PRACTITIONER

## 2024-07-18 PROCEDURE — 99999 PR PBB SHADOW E&M-EST. PATIENT-LVL V: CPT | Mod: PBBFAC,,, | Performed by: NURSE PRACTITIONER

## 2024-07-18 RX ORDER — TRIAMTERENE/HYDROCHLOROTHIAZID 37.5-25 MG
1 TABLET ORAL
Qty: 90 TABLET | Refills: 0 | Status: SHIPPED | OUTPATIENT
Start: 2024-07-18 | End: 2024-07-18 | Stop reason: SDUPTHER

## 2024-07-18 RX ORDER — CELECOXIB 100 MG/1
100 CAPSULE ORAL 2 TIMES DAILY
Qty: 60 CAPSULE | Refills: 0 | Status: SHIPPED | OUTPATIENT
Start: 2024-07-18 | End: 2024-08-17

## 2024-07-18 RX ORDER — TRIAMTERENE/HYDROCHLOROTHIAZID 37.5-25 MG
1 TABLET ORAL DAILY
Qty: 90 TABLET | Refills: 1 | Status: SHIPPED | OUTPATIENT
Start: 2024-07-18

## 2024-07-18 NOTE — PROGRESS NOTES
Patient ID: Daniela Lawson is a 59 y.o. female.    Chief Complaint: Pain ( Right thumb wrist pain. Woke up 2024 to find her finger swollen and in pain. X=ray urgent care/)    Ms. Lawson presents today for urgent visit. She states she woke up on Mo nday AM with pain in her wrist and she is no longer able to place weight on her wrist. She did present to urgent care where they did an x ray. It did not show fracture but it shows a significant amount of arthritis. She has been taking ibuprofen with minimal relief. She denies any other issues or complaints.     Wrist Injury   Her dominant hand is their right hand. The incident occurred 3 to 5 days ago. The incident occurred at home. There was no injury mechanism. The pain is present in the right hand, right wrist and right fingers. The quality of the pain is described as aching. The pain does not radiate. The pain is at a severity of 4/10. The pain is moderate. The pain has been Fluctuating since the incident. Pertinent negatives include no chest pain, muscle weakness, numbness or tingling. Nothing aggravates the symptoms. She has tried elevation, ice, NSAIDs and acetaminophen for the symptoms.     Past Medical History:   Diagnosis Date    Anesthesia     Pt reports severe drop in BP when anesthesia given.    Arthritis     Asthma     Diverticulitis     GERD (gastroesophageal reflux disease)     Hypertension     Neuropathy     PONV (postoperative nausea and vomiting)     Stroke     RETINACULAR     Wears glasses      Past Surgical History:   Procedure Laterality Date     SECTION  1990    CHOLECYSTECTOMY      HYSTERECTOMY      INSERTION OF URETERAL CATHETER N/A 10/16/2019    Procedure: INSERTION, CATHETER, URETER;  Surgeon: Willard Ruffin MD;  Location: [facility] OR;  Service: Urology;  Laterality: N/A;    LAPAROSCOPIC SIGMOIDECTOMY N/A 10/16/2019    Procedure: COLECTOMY, SIGMOID, LAPAROSCOPIC;  Surgeon: Doug Gifford MD;  Location: [facility]  OR;  Service: General;  Laterality: N/A;    ROBOT-ASSISTED LAPAROSCOPIC REPAIR OF INCISIONAL HERNIA N/A 11/16/2023    Procedure: ROBOTIC REPAIR, HERNIA, INCISIONAL;  Surgeon: Nj Penaloza MD;  Location: [facility] OR;  Service: General;  Laterality: N/A;    SHOULDER ARTHROSCOPY Bilateral     STOMACH SURGERY      Possible Nissen per pt- To treat acid reflux     TUBAL LIGATION  07/27/1990         Tobacco History:  reports that she has never smoked. She has never used smokeless tobacco.      Review of patient's allergies indicates:   Allergen Reactions    Meperidine Other (See Comments)     MAKES HER VERY EVIL    Oxycodone-acetaminophen Itching    Adhesive Hives    Lisinopril Other (See Comments)     Chest pain    Meloxicam Other (See Comments)     LEGS SWELL       Current Outpatient Medications:     amLODIPine (NORVASC) 2.5 MG tablet, TAKE 1 TABLET DAILY, Disp: 90 tablet, Rfl: 0    aspirin (ECOTRIN) 81 MG EC tablet, Take 81 mg by mouth once daily., Disp: , Rfl:     cetirizine 10 mg chewable tablet, Take 10 mg by mouth once daily., Disp: , Rfl:     cholecalciferol, vitamin D3, 125 mcg (5,000 unit) Tab, 5,000 Units once daily. , Disp: , Rfl:     cyanocobalamin, vitamin B-12, 5,000 mcg TbDL, Take 5,000 Units by mouth once daily. , Disp: , Rfl:     HYDROcodone-acetaminophen (NORCO) 5-325 mg per tablet, Take 1 tablet by mouth every 6 (six) hours as needed for Pain., Disp: 10 tablet, Rfl: 0    icosapent ethyL (VASCEPA) 1 gram Cap, TAKE 2 CAPSULES TWICE A DAY, Disp: 360 capsule, Rfl: 0    ketorolac 0.4% (ACULAR) 0.4 % Drop, Place 1 drop into both eyes 4 (four) times daily., Disp: , Rfl:     LUCENTIS 0.5 mg/0.05 mL Syrg injection, , Disp: , Rfl:     magnesium oxide 500 mg Cap, Take 1 capsule by mouth once daily., Disp: , Rfl:     multivitamin capsule, Take 1 capsule by mouth once daily., Disp: , Rfl:     nebivoloL (BYSTOLIC) 5 MG Tab, TAKE 1 TABLET DAILY, Disp: 90 tablet, Rfl: 0    nystatin-triamcinolone (MYCOLOG II) cream,  Apply topically 2 (two) times daily., Disp: 60 g, Rfl: 2    progesterone (PROMETRIUM) 200 MG capsule, Take 200 mg by mouth every evening., Disp: , Rfl:     salmeteroL (SEREVENT) 50 mcg/dose diskus inhaler, Inhale 1 puff into the lungs 2 (two) times daily. Controller (Patient taking differently: Inhale 1 puff into the lungs 2 (two) times daily. Controller  PRN), Disp: 3 each, Rfl: 3    turmeric root extract 500 mg Cap, Take 500 mg by mouth once daily. , Disp: , Rfl:     albuterol (PROVENTIL/VENTOLIN HFA) 90 mcg/actuation inhaler, Inhale 1-2 puffs into the lungs every 4 to 6 hours as needed for Wheezing or Shortness of Breath. Rescue, Disp: 18 g, Rfl: 3    celecoxib (CELEBREX) 100 MG capsule, Take 1 capsule (100 mg total) by mouth 2 (two) times daily., Disp: 60 capsule, Rfl: 0    phentermine (ADIPEX-P) 37.5 mg tablet, Take 1 tablet (37.5 mg total) by mouth once daily., Disp: 90 tablet, Rfl: 0    triamterene-hydrochlorothiazide 37.5-25 mg (MAXZIDE-25) 37.5-25 mg per tablet, Take 1 tablet by mouth once daily., Disp: 90 tablet, Rfl: 1    Review of Systems   Constitutional:  Negative for activity change and unexpected weight change.   HENT:  Negative for hearing loss, rhinorrhea and trouble swallowing.    Eyes:  Negative for discharge and visual disturbance.   Respiratory:  Negative for chest tightness and wheezing.    Cardiovascular:  Negative for chest pain and palpitations.   Gastrointestinal:  Negative for blood in stool, constipation, diarrhea and vomiting.   Endocrine: Negative for polydipsia and polyuria.   Genitourinary:  Negative for difficulty urinating, dysuria, hematuria and menstrual problem.   Musculoskeletal:  Positive for arthralgias and joint swelling. Negative for neck pain.   Neurological:  Negative for tingling, weakness, numbness and headaches.   Psychiatric/Behavioral:  Negative for confusion and dysphoric mood.           Objective:      Vitals:    07/18/24 1417 07/18/24 1432   BP: (!) 140/80 136/82    Pulse: 73    Temp: 98.1 °F (36.7 °C)    Weight: 78.8 kg (173 lb 11.2 oz)      Physical Exam  Constitutional:       Appearance: Normal appearance.   Cardiovascular:      Rate and Rhythm: Normal rate and regular rhythm.      Heart sounds: Normal heart sounds.   Pulmonary:      Effort: Pulmonary effort is normal.      Breath sounds: Normal breath sounds.   Skin:     General: Skin is warm.   Neurological:      Mental Status: She is alert and oriented to person, place, and time.           Assessment:       1. Right wrist pain    2. Hypertension, unspecified type           Plan:       Right wrist pain  -     celecoxib (CELEBREX) 100 MG capsule; Take 1 capsule (100 mg total) by mouth 2 (two) times daily.  Dispense: 60 capsule; Refill: 0    Hypertension, unspecified type  -     triamterene-hydrochlorothiazide 37.5-25 mg (MAXZIDE-25) 37.5-25 mg per tablet; Take 1 tablet by mouth once daily.  Dispense: 90 tablet; Refill: 1      Follow up if symptoms worsen or fail to improve.        7/18/2024 Jacque Mancuso NP

## 2024-08-08 ENCOUNTER — TELEPHONE (OUTPATIENT)
Dept: UROGYNECOLOGY | Facility: CLINIC | Age: 59
End: 2024-08-08
Payer: COMMERCIAL

## 2024-09-04 DIAGNOSIS — I10 HYPERTENSION, UNSPECIFIED TYPE: ICD-10-CM

## 2024-09-16 ENCOUNTER — TELEPHONE (OUTPATIENT)
Dept: ORTHOPEDICS | Facility: CLINIC | Age: 59
End: 2024-09-16
Payer: COMMERCIAL

## 2024-09-16 DIAGNOSIS — M25.551 RIGHT HIP PAIN: Primary | ICD-10-CM

## 2024-09-16 RX ORDER — AMLODIPINE BESYLATE 2.5 MG/1
2.5 TABLET ORAL
Qty: 90 TABLET | Refills: 0 | Status: SHIPPED | OUTPATIENT
Start: 2024-09-16

## 2024-09-16 RX ORDER — NEBIVOLOL 5 MG/1
5 TABLET ORAL
Qty: 90 TABLET | Refills: 0 | Status: SHIPPED | OUTPATIENT
Start: 2024-09-16

## 2024-09-17 ENCOUNTER — OFFICE VISIT (OUTPATIENT)
Dept: ORTHOPEDICS | Facility: CLINIC | Age: 59
End: 2024-09-17
Payer: COMMERCIAL

## 2024-09-17 ENCOUNTER — HOSPITAL ENCOUNTER (OUTPATIENT)
Dept: RADIOLOGY | Facility: HOSPITAL | Age: 59
Discharge: HOME | End: 2024-09-17
Attending: ORTHOPAEDIC SURGERY
Payer: COMMERCIAL

## 2024-09-17 VITALS — WEIGHT: 173.75 LBS | BODY MASS INDEX: 29.66 KG/M2 | HEIGHT: 64 IN

## 2024-09-17 DIAGNOSIS — G57.01 PIRIFORMIS SYNDROME, RIGHT: Primary | ICD-10-CM

## 2024-09-17 DIAGNOSIS — M25.551 RIGHT HIP PAIN: ICD-10-CM

## 2024-09-17 PROCEDURE — 99999 PR PBB SHADOW E&M-EST. PATIENT-LVL III: CPT | Mod: PBBFAC,,, | Performed by: ORTHOPAEDIC SURGERY

## 2024-09-17 PROCEDURE — 3008F BODY MASS INDEX DOCD: CPT | Mod: CPTII,S$GLB,, | Performed by: ORTHOPAEDIC SURGERY

## 2024-09-17 PROCEDURE — 1159F MED LIST DOCD IN RCRD: CPT | Mod: CPTII,S$GLB,, | Performed by: ORTHOPAEDIC SURGERY

## 2024-09-17 PROCEDURE — 73502 X-RAY EXAM HIP UNI 2-3 VIEWS: CPT | Mod: 26,RT,, | Performed by: RADIOLOGY

## 2024-09-17 PROCEDURE — 73502 X-RAY EXAM HIP UNI 2-3 VIEWS: CPT | Mod: TC,RT

## 2024-09-17 PROCEDURE — 99214 OFFICE O/P EST MOD 30 MIN: CPT | Mod: S$GLB,,, | Performed by: ORTHOPAEDIC SURGERY

## 2024-09-17 NOTE — PROGRESS NOTES
Patient ID: Daniela Lawson is a 59 y.o. female    Chief Complaint:   Chief Complaint   Patient presents with    Right Hip - Pain     Right Hp pain for about 2 months. She states has gotten worse over the past moth. Pain increases with Stepping up.        History of Present Illness:    Pleasant 59-year-old female here for evaluation of right hip pain.  She reports deep posterior right buttock pain that will occasionally radiate to the knee posteriorly.  This has been going on for about 2-1/2 months.  She has good days and bad days.  Denies any groin pain..  Has had this issue in the past and was treated with a piriformis injection.    PAST MEDICAL HISTORY:   Past Medical History:   Diagnosis Date    Anesthesia     Pt reports severe drop in BP when anesthesia given.    Arthritis     Asthma     Diverticulitis     GERD (gastroesophageal reflux disease)     Hypertension     Neuropathy     PONV (postoperative nausea and vomiting)     Stroke     RETINACULAR     Wears glasses      PAST SURGICAL HISTORY:   Past Surgical History:   Procedure Laterality Date     SECTION  1990    CHOLECYSTECTOMY      HYSTERECTOMY      INSERTION OF URETERAL CATHETER N/A 10/16/2019    Procedure: INSERTION, CATHETER, URETER;  Surgeon: Willard Ruffin MD;  Location: [facility] OR;  Service: Urology;  Laterality: N/A;    LAPAROSCOPIC SIGMOIDECTOMY N/A 10/16/2019    Procedure: COLECTOMY, SIGMOID, LAPAROSCOPIC;  Surgeon: Doug Gifford MD;  Location: [facility] OR;  Service: General;  Laterality: N/A;    ROBOT-ASSISTED LAPAROSCOPIC REPAIR OF INCISIONAL HERNIA N/A 2023    Procedure: ROBOTIC REPAIR, HERNIA, INCISIONAL;  Surgeon: Nj Penaloza MD;  Location: [facility] OR;  Service: General;  Laterality: N/A;    SHOULDER ARTHROSCOPY Bilateral     STOMACH SURGERY      Possible Nissen per pt- To treat acid reflux     TUBAL LIGATION  1990     FAMILY HISTORY:   Family History   Problem Relation Name Age of Onset     Coronary artery disease Mother      Hyperlipidemia Mother      Other Mother          Breast Nodules    Arthritis Mother      Hypertension Father      Atrial fibrillation Father      Neuropathy Father      Other Father          Bypass    Diabetes Father      Hypertension Sister      Cancer Sister          Breast Cacner    Neuropathy Sister      Breast cancer Sister      Hypertension Daughter      No Known Problems Son      Diabetes Maternal Grandmother      Hypertension Maternal Grandmother      No Known Problems Maternal Grandfather      Coronary artery disease Paternal Grandmother      Cancer Paternal Grandmother          Breast Cancer    Heart attack Paternal Grandmother      Breast cancer Paternal Grandmother      Dementia Paternal Grandfather      Heart attack Paternal Grandfather      No Known Problems Sister      Other Daughter          Benign Hypertension(Brain)    Breast cancer Maternal Aunt      Breast cancer Paternal Aunt       SOCIAL HISTORY:   Social History     Occupational History    Not on file   Tobacco Use    Smoking status: Never    Smokeless tobacco: Never   Substance and Sexual Activity    Alcohol use: Yes     Alcohol/week: 3.0 standard drinks of alcohol     Types: 3 Glasses of wine per week     Comment: 3 glasses of wine per week     Drug use: Never    Sexual activity: Not on file        MEDICATIONS:   Current Outpatient Medications:     amLODIPine (NORVASC) 2.5 MG tablet, TAKE 1 TABLET DAILY, Disp: 90 tablet, Rfl: 0    aspirin (ECOTRIN) 81 MG EC tablet, Take 81 mg by mouth once daily., Disp: , Rfl:     cetirizine 10 mg chewable tablet, Take 10 mg by mouth once daily., Disp: , Rfl:     cholecalciferol, vitamin D3, 125 mcg (5,000 unit) Tab, 5,000 Units once daily. , Disp: , Rfl:     cyanocobalamin, vitamin B-12, 5,000 mcg TbDL, Take 5,000 Units by mouth once daily. , Disp: , Rfl:     HYDROcodone-acetaminophen (NORCO) 5-325 mg per tablet, Take 1 tablet by mouth every 6 (six) hours as needed for  Pain., Disp: 10 tablet, Rfl: 0    icosapent ethyL (VASCEPA) 1 gram Cap, TAKE 2 CAPSULES TWICE A DAY, Disp: 360 capsule, Rfl: 0    ketorolac 0.4% (ACULAR) 0.4 % Drop, Place 1 drop into both eyes 4 (four) times daily., Disp: , Rfl:     magnesium oxide 500 mg Cap, Take 1 capsule by mouth once daily., Disp: , Rfl:     multivitamin capsule, Take 1 capsule by mouth once daily., Disp: , Rfl:     nebivoloL (BYSTOLIC) 5 MG Tab, TAKE 1 TABLET DAILY, Disp: 90 tablet, Rfl: 0    nystatin-triamcinolone (MYCOLOG II) cream, Apply topically 2 (two) times daily., Disp: 60 g, Rfl: 2    progesterone (PROMETRIUM) 200 MG capsule, Take 200 mg by mouth every evening., Disp: , Rfl:     salmeteroL (SEREVENT) 50 mcg/dose diskus inhaler, Inhale 1 puff into the lungs 2 (two) times daily. Controller (Patient taking differently: Inhale 1 puff into the lungs 2 (two) times daily. Controller  PRN), Disp: 3 each, Rfl: 3    triamterene-hydrochlorothiazide 37.5-25 mg (MAXZIDE-25) 37.5-25 mg per tablet, Take 1 tablet by mouth once daily., Disp: 90 tablet, Rfl: 1    turmeric root extract 500 mg Cap, Take 500 mg by mouth once daily. , Disp: , Rfl:     albuterol (PROVENTIL/VENTOLIN HFA) 90 mcg/actuation inhaler, Inhale 1-2 puffs into the lungs every 4 to 6 hours as needed for Wheezing or Shortness of Breath. Rescue, Disp: 18 g, Rfl: 3    LUCENTIS 0.5 mg/0.05 mL Syrg injection, , Disp: , Rfl:     phentermine (ADIPEX-P) 37.5 mg tablet, Take 1 tablet (37.5 mg total) by mouth once daily., Disp: 90 tablet, Rfl: 0  ALLERGIES:   Review of patient's allergies indicates:   Allergen Reactions    Meperidine Other (See Comments)     MAKES HER VERY EVIL    Oxycodone-acetaminophen Itching    Adhesive Hives    Lisinopril Other (See Comments)     Chest pain    Meloxicam Other (See Comments)     LEGS SWELL         Physical Exam     There were no vitals filed for this visit.  Alert and oriented to person, place and time. No acute distress. Well-groomed, not ill appearing.  Pupils round and reactive, normal respiratory effort, no audible wheezing.     Gait: She  walks with a antalgic gait.                   EXTREMITIES:  Examination of lower extremities reveals there is no visible mass or deformity.        Right hip:  ROM(IR/ER) 35/60    .Negative FADIR test, positive SHAN test    .Negative Stinchfield test     Negative trochanteric pain.    Negative straight leg raise.    No warmth    No erythema        The skin over both lower extremities is normal and unremarkable.  She has a  painless range of motion of the knees and ankles bilaterally.   Sensation is intact in both lower extremities.    There are no motor deficits in the lower extremities bilaterally.   Pedal pulses are palpable distally bilaterally.    She has no calf tenderness to palpation nor edema.       Imaging:       X-Ray: I have reviewed all pertinent results/findings and my personal findings are:  Mild DJD of the right hip      Assessment & Plan    Piriformis syndrome, right         Treatment options were discussed with her in detail.  Most of her symptoms are isolated to the posterior buttock region consistent with piriformis syndrome.  She has done well in the past with injections.  Declines physical therapy today.  Discussed stretching program and hip and core work.  Also would benefit from a ultrasound-guided piriformis injection .  We will place referral for that

## 2024-09-24 ENCOUNTER — OFFICE VISIT (OUTPATIENT)
Dept: ORTHOPEDICS | Facility: CLINIC | Age: 59
End: 2024-09-24
Payer: COMMERCIAL

## 2024-09-24 VITALS — BODY MASS INDEX: 29.66 KG/M2 | WEIGHT: 173.75 LBS | HEIGHT: 64 IN

## 2024-09-24 DIAGNOSIS — G57.01 PIRIFORMIS SYNDROME, RIGHT: Primary | ICD-10-CM

## 2024-09-24 DIAGNOSIS — M25.551 RIGHT HIP PAIN: ICD-10-CM

## 2024-09-24 PROCEDURE — 99999 PR PBB SHADOW E&M-EST. PATIENT-LVL IV: CPT | Mod: PBBFAC,,, | Performed by: FAMILY MEDICINE

## 2024-09-24 PROCEDURE — 3008F BODY MASS INDEX DOCD: CPT | Mod: CPTII,S$GLB,, | Performed by: FAMILY MEDICINE

## 2024-09-24 PROCEDURE — 1159F MED LIST DOCD IN RCRD: CPT | Mod: CPTII,S$GLB,, | Performed by: FAMILY MEDICINE

## 2024-09-24 PROCEDURE — 76942 ECHO GUIDE FOR BIOPSY: CPT | Mod: S$GLB,,, | Performed by: FAMILY MEDICINE

## 2024-09-24 PROCEDURE — 20552 NJX 1/MLT TRIGGER POINT 1/2: CPT | Mod: S$GLB,,, | Performed by: FAMILY MEDICINE

## 2024-09-24 PROCEDURE — 99214 OFFICE O/P EST MOD 30 MIN: CPT | Mod: 25,S$GLB,, | Performed by: FAMILY MEDICINE

## 2024-09-24 RX ORDER — METHYLPREDNISOLONE ACETATE 80 MG/ML
80 INJECTION, SUSPENSION INTRA-ARTICULAR; INTRALESIONAL; INTRAMUSCULAR; SOFT TISSUE
Status: DISCONTINUED | OUTPATIENT
Start: 2024-09-24 | End: 2024-09-24 | Stop reason: HOSPADM

## 2024-09-24 RX ADMIN — METHYLPREDNISOLONE ACETATE 80 MG: 80 INJECTION, SUSPENSION INTRA-ARTICULAR; INTRALESIONAL; INTRAMUSCULAR; SOFT TISSUE at 10:09

## 2024-09-24 NOTE — PROGRESS NOTES
Subjective     Patient ID: Daniela Lawson is a 59 y.o. female.    Chief Complaint: Pain of the Right Hip (R) hip periformis injection, referred by /Pt states pain has been ongoing for several months. /No recent fall/injury. )    59-year-old female here today with complaints of right-sided posterior hip pain.  This has been ongoing for several months but has a chronic issue that she has been bothered with for years.  She was diagnosed with a piriformis syndrome years ago.  Notes that at least once she had an injection into her piriformis which did provide significant relief.  It seems to be an off and on pain but it has now become increasingly severe.  Will have pain that will radiate down her leg occasionally to the level of her knee.  Has been able to manage it with ibuprofen and stretching exercises however they are not working this time.  She was seen by Dr. Grover for this recently.  Sent here for a possible ultrasound-guided piriformis injection.    Pain  Pertinent negatives include no chest pain, chills, congestion, coughing, headaches, rash or sore throat.       Review of Systems   Constitutional: Negative for chills and decreased appetite.   HENT:  Negative for congestion and sore throat.    Eyes:  Negative for blurred vision.   Cardiovascular:  Negative for chest pain, dyspnea on exertion and palpitations.   Respiratory:  Negative for cough and shortness of breath.    Skin:  Negative for rash.   Neurological:  Negative for difficulty with concentration, disturbances in coordination and headaches.   Psychiatric/Behavioral:  Negative for altered mental status, depression, hallucinations, memory loss and suicidal ideas.         Objective     General    Nursing note and vitals reviewed.  Constitutional: She is oriented to person, place, and time. She appears well-developed and well-nourished.   HENT:   Nose: Nose normal.   Eyes: EOM are normal. Pupils are equal, round, and reactive to light.    Neck: Neck supple.   Cardiovascular:  Normal rate.            Pulmonary/Chest: Effort normal.   Abdominal: Soft.   Neurological: She is alert and oriented to person, place, and time. She has normal reflexes.   Psychiatric: She has a normal mood and affect. Her behavior is normal. Judgment and thought content normal.             Right Hip Exam     Inspection   Scars: absent  Swelling: absent  No deformity of hip.    Tenderness   The patient tender to palpation of the piriformis.    Range of Motion   Extension:  20   Flexion:  140   External rotation:  80   Internal rotation:  30     Muscle Strength   The patient has normal right hip strength.    Tests   Pain w/ forced internal rotation (SHAN): absent  Pain w/ forced external rotation (FADIR): present  Clarence: negative  Circumduction test: negative  Log Roll: negative  Snapping Hip (internal): negative    Other   Sensation: normal    Comments:  Positive piriformis sign  Left Hip Exam   Left hip exam is normal.    Range of Motion   The patient has normal left hip ROM.    Muscle Strength   The patient has normal left hip strength.           Vascular Exam     Right Pulses  Dorsalis Pedis:      2+        Physical Exam  Vitals and nursing note reviewed.   Constitutional:       Appearance: She is well-developed and well-nourished.   HENT:      Nose: Nose normal.   Eyes:      Extraocular Movements: EOM normal.      Pupils: Pupils are equal, round, and reactive to light.   Cardiovascular:      Rate and Rhythm: Normal rate.      Pulses:           Dorsalis pedis pulses are 2+ on the right side.   Pulmonary:      Effort: Pulmonary effort is normal.   Abdominal:      Palpations: Abdomen is soft.   Musculoskeletal:      Cervical back: Normal range of motion and neck supple.      Right hip: No swelling or deformity.   Neurological:      Mental Status: She is alert and oriented to person, place, and time.      Deep Tendon Reflexes: Reflexes are normal and symmetric.   Psychiatric:          Mood and Affect: Mood and affect normal.         Behavior: Behavior normal.         Thought Content: Thought content normal.         Judgment: Judgment normal.           Assessment and Plan     Encounter Diagnoses   Name Primary?    Piriformis syndrome, right Yes    Right hip pain          Daniela was seen today for pain.    Diagnoses and all orders for this visit:    Piriformis syndrome, right    Right hip pain      Gave her an ultrasound-guided injection into the piriformis muscle.  She tolerated well.  I would like her to follow up in one month for recheck of this.    R Pirifomis Injection    Date/Time: 9/24/2024 10:40 AM    Performed by: Miguel Tracy MD  Authorized by: Miguel Tracy MD    Consent Done?:  Yes (Verbal)  Indications:  Arthritis and pain  Site marked: the procedure site was marked    Timeout: prior to procedure the correct patient, procedure, and site was verified    Prep: patient was prepped and draped in usual sterile fashion      Local anesthesia used?: Yes    Local anesthetic:  Lidocaine 1% without epinephrine and topical anesthetic  Anesthetic total (ml):  6      Details:  Needle Size:  22 G  Ultrasonic Guidance for needle placement?: Yes    Images are saved and documented.  Approach:  Posterior  Location:  Hip  Hip joint: R piriformis.  Medications:  80 mg methylPREDNISolone acetate 80 mg/mL  Patient tolerance:  Patient tolerated the procedure well with no immediate complications     Ultrasound guidance was used to confirm proper needle placement.  Images of proper needle placement were saved and stored to the machine and uploaded to the patient's chart.

## 2024-10-14 ENCOUNTER — OFFICE VISIT (OUTPATIENT)
Dept: UROGYNECOLOGY | Facility: CLINIC | Age: 59
End: 2024-10-14
Payer: COMMERCIAL

## 2024-10-14 ENCOUNTER — PATIENT MESSAGE (OUTPATIENT)
Dept: FAMILY MEDICINE | Facility: CLINIC | Age: 59
End: 2024-10-14
Payer: COMMERCIAL

## 2024-10-14 VITALS
HEIGHT: 64 IN | SYSTOLIC BLOOD PRESSURE: 117 MMHG | WEIGHT: 168.19 LBS | BODY MASS INDEX: 28.71 KG/M2 | DIASTOLIC BLOOD PRESSURE: 76 MMHG | HEART RATE: 64 BPM

## 2024-10-14 DIAGNOSIS — Z90.49 HISTORY OF OPEN SIGMOIDECTOMY: Primary | ICD-10-CM

## 2024-10-14 DIAGNOSIS — N81.9 VAGINAL VAULT PROLAPSE: ICD-10-CM

## 2024-10-14 DIAGNOSIS — N39.46 MIXED STRESS AND URGE URINARY INCONTINENCE: ICD-10-CM

## 2024-10-14 DIAGNOSIS — N39.46 URINARY INCONTINENCE, MIXED: ICD-10-CM

## 2024-10-14 DIAGNOSIS — N95.2 VAGINAL ATROPHY: ICD-10-CM

## 2024-10-14 DIAGNOSIS — K59.09 CHRONIC CONSTIPATION: ICD-10-CM

## 2024-10-14 DIAGNOSIS — Z98.890 HISTORY OF OPEN SIGMOIDECTOMY: Primary | ICD-10-CM

## 2024-10-14 DIAGNOSIS — N81.11 CYSTOCELE, MIDLINE: ICD-10-CM

## 2024-10-14 DIAGNOSIS — Z87.19 HISTORY OF DIVERTICULITIS: ICD-10-CM

## 2024-10-14 DIAGNOSIS — N81.6 RECTOCELE, FEMALE: ICD-10-CM

## 2024-10-14 PROCEDURE — 87086 URINE CULTURE/COLONY COUNT: CPT | Performed by: OBSTETRICS & GYNECOLOGY

## 2024-10-14 PROCEDURE — 3008F BODY MASS INDEX DOCD: CPT | Mod: CPTII,S$GLB,, | Performed by: OBSTETRICS & GYNECOLOGY

## 2024-10-14 PROCEDURE — 3074F SYST BP LT 130 MM HG: CPT | Mod: CPTII,S$GLB,, | Performed by: OBSTETRICS & GYNECOLOGY

## 2024-10-14 PROCEDURE — 1160F RVW MEDS BY RX/DR IN RCRD: CPT | Mod: CPTII,S$GLB,, | Performed by: OBSTETRICS & GYNECOLOGY

## 2024-10-14 PROCEDURE — 99205 OFFICE O/P NEW HI 60 MIN: CPT | Mod: 25,S$GLB,, | Performed by: OBSTETRICS & GYNECOLOGY

## 2024-10-14 PROCEDURE — 51701 INSERT BLADDER CATHETER: CPT | Mod: S$GLB,,, | Performed by: OBSTETRICS & GYNECOLOGY

## 2024-10-14 PROCEDURE — 3078F DIAST BP <80 MM HG: CPT | Mod: CPTII,S$GLB,, | Performed by: OBSTETRICS & GYNECOLOGY

## 2024-10-14 PROCEDURE — 99999 PR PBB SHADOW E&M-EST. PATIENT-LVL IV: CPT | Mod: PBBFAC,,, | Performed by: OBSTETRICS & GYNECOLOGY

## 2024-10-14 PROCEDURE — 1159F MED LIST DOCD IN RCRD: CPT | Mod: CPTII,S$GLB,, | Performed by: OBSTETRICS & GYNECOLOGY

## 2024-10-14 RX ORDER — AFLIBERCEPT 40 MG/ML
2 INJECTION, SOLUTION INTRAVITREAL
COMMUNITY

## 2024-10-14 NOTE — PATIENT INSTRUCTIONS
Bladder Irritants  Certain foods and drinks have been associated with worsening symptoms of urinary frequency, urgency, urge incontinence, or bladder pain. If you suffer from any of these conditions, you may wish to try eliminating one or more of these foods from your diet and see if your symptoms improve. If bladder symptoms are related to dietary factors, strict adherence to a diet thateliminates the food should bring marked relief in 10 days. Once you are feeling better, you can begin to add foods back into your diet, one at a time. If symptoms return, you will be able to identify the irritant. As you add foods back to your diet it is very important that you drink significant amounts of water.    -----------------------------------------------------------------------------------------------  List of Common Bladder Irritants*  Alcoholic beverages  Apples and apple juice  Cantaloupe  Carbonated beverages  Chili and spicy foods  Chocolate  Citrus fruit  Coffee (including decaffeinated)  Cranberries and cranberry juice  Grapes  Guava  Milk Products: milk, cheese, cottage cheese, yogurt, ice cream  Peaches  Pineapple  Plums  Strawberries  Sugar especially artificial sweeteners, saccharin, aspartame, corn sweeteners, honey, fructose, sucrose, lactose  Tea  Tomatoes and tomato juice  Vitamin B complex  Vinegar  *Most people are not sensitive to ALL of these products; your goal is to find the foods that make YOUR symptoms worse.  ---------------------------------------------------------------------------------------------------    Low-acid fruit substitutions include apricots, papaya, pears and watermelon. Coffee drinkers can drink Kava or other lowacid instant drinks. Tea drinkers can substitute non-citrus herbal and sun brewed teas. Calcium carbonate co-buffered with calcium ascorbate can be substituted for Vitamin C. Prelief is a dietary supplement that works as an acid blocker for the bladder.    Where to get more  information:        Overcoming Bladder Disorders by Jesusita Jurado and Racheal Quigley, 1990        You Dont Have to Live with Cystitis! By Daysi Richardson, 1988  http://[email or web address].org/oab  ---------------------------------------------------------------------    1)  Stage 2 cystocele/rectocele, stage 1 vaginal vault prolapse:  --discussed  --options: do nothing vs PT vs pessary vs surgery   --if pessary: would moisturize for a few weeks before, the continue; consider different shape/size if needed   --if surgery: robotic sacral colpopexy; could also try to remove the piece of ovary if can be found; consider posterior repair at end if needed    --watch umbilical mesh   --if surgery bladder testing to see if you need sling or urethral bulking for stress incontinence   --if surgery:  clearance per NP [place] and cards (Waldo Jones) + labs (CBC, CMP, T&S)/EKG   --if surgery:      2)  Mixed urinary incontinence, urge = stress:    --urine C&S  --Empty bladder every 3 hours.  Empty well: wait a minute, lean forward on toilet.    --Avoid dietary irritants (see sheet).  Keep diary x 3-5 days to determine your irritants.  --KEGELS: do 10 in AM and 10 in PM, holding each x 10 seconds.  When you feel urge to go, STOP, KEGEL, and when urge has passed, then go to bathroom.  Consider PT in future.    --URGE: consider medication if needed. Takes 2-4 weeks to see if will have effect.  For dry mouth: get sour, sugar free lozenge or gum.    --STRESS:  Pessary vs. Sling vs bulking.     3)  Vaginal atrophy (dryness):    --start vaginal estrogen:   --will send to [facility] to see if can mix with less irritating base:    [facility] (they deliver)  [address], [place] [address] [phone]    --Use dime-sized amount with finger (as far as can reach internally) nightly x 2 weeks, then twice a week thereafter.  You can also apply a dime-sized amount with your finger around the vaginal  opening and inner lips at same frequency.     --Vaginal estrogen may help to decrease pain related to dryness with intercourse and urinary symptoms (urgency/frequency/UTIs) around menopause.      --can use the following as needed/if feels irritated:  --coconut oil: dime-sized amount with finger (as far as can reach internally) and around the vaginal opening and inner lips up to nightly as needed    4)  Constipation:  --hydrate well  --continue daily magnesium  Controlling constipation may help bladder urgency/leakage and fiber may better control cholesterol and blood glucose.  Start daily fiber.  Take 1 tsp of fiber powder (psyllium or other sugar-free powder).  Mix in 8 oz of water.  Take x 3-5 days.  Then, increase fiber by 1 tsp every 3-5 days until stool is easy to pass.  Stop and continue at that dose.   Do not exceed 6 tsps/day.  May also use over the counter stool softener 1-2 x/day.  AVOID laxatives.  --GOAL: start BMs without major straining   --incomplete defecation may still be present due to rectocele  --go to pelvic floor PT to work on minimizing straining with bowel movements. Call to make appt:  [facility] (all take Medicaid):  TAYLOR Perduell: [address]  (p) [phone]    5)  H/o diverticulitis s/p LSC sigmoidectomy:  --CRS consult to get plugged in for routine follow up/scopes  --would like NS follow up: [facility] in [place]    6)  h/o breast CA + colon CA:  --talk with sister to see if has genetic testing  --would strongly suggest genetic testing,lexi with breast and colon CA together    7)  Will have surgery scheduler call you in about 1-2 weeks.

## 2024-10-14 NOTE — PROGRESS NOTES
[facility] UROGYNECOLOGY  [address] [address]    Daniela Lawson  [id number]  1965 October 14, 2024    Consulting Physician: Leonor Field MD   GYN: MD Rashida  Primary M.D.: Yoni Morelos, APRN,FNP-C    Chief Complaint   Patient presents with    Vaginal Prolapse       HPI:     1)  UI:  (+) HENRY (sneeze, cough, jump) = (+) UUI (can be spontaneous on standing).  (--) pads.  Changes underwear a few times/week.   Daytime frequency: Q 1-2 hours.  Nocturia: Yes: 1+/night.   (--) dysuria,  (--) hematuria,  (--) frequent UTIs.  (--) complete bladder emptying. PV to help.     2)  POP:  Present. Tried pessary per GYN's PA--was uncomfortable.  Estrogen cream caused burning/swelling.  Just to introitus some days.  Symptoms:(+)  feels like has tampon in place.  (--) vaginal bleeding. (--) vaginal discharge. +odor.   (--) sexually active.  (+) h/o mild dyspareunia.  Due to dryness.  (+)  Vaginal dryness.  (--) vaginal estrogen use. Estrogen cream (estrace) caused burning/swelling.      3)  BM:  (+) constipation/straining. Taking magnesium daily--has to take 2 sometimes--helps but 1st part is usually hard. (--) chronic diarrhea. (--) hematochezia.  (--) fecal incontinence.  (--) fecal smearing/urgency. (+)/--   complete evacuation.  No splinting. No s/sx rectal prolapse.     Past Medical History  Past Medical History:   Diagnosis Date    Anesthesia 2009    Pt reports severe drop in BP when anesthesia given.    Arthritis     Asthma     Diverticulitis     GERD (gastroesophageal reflux disease)     Hypertension     Neuropathy 2007    PONV (postoperative nausea and vomiting)     Stroke     RETINACULAR     Wears glasses    --B foot neuropathy: has bone spur on spine; unknown etiology; takes mag/B12/D3; better since stopped working/not standing as long  --L eye retinal stroke: gets injection every 2 months; vision fluctuates    Past Surgical History  Past Surgical History:   Procedure  Laterality Date     SECTION  1990    CHOLECYSTECTOMY      HYSTERECTOMY      INSERTION OF URETERAL CATHETER N/A 10/16/2019    Procedure: INSERTION, CATHETER, URETER;  Surgeon: Willard Ruffin MD;  Location: [facility] OR;  Service: Urology;  Laterality: N/A;    LAPAROSCOPIC SIGMOIDECTOMY N/A 10/16/2019    Procedure: COLECTOMY, SIGMOID, LAPAROSCOPIC;  Surgeon: Doug Gifford MD;  Location: [facility] OR;  Service: General;  Laterality: N/A;    ROBOT-ASSISTED LAPAROSCOPIC REPAIR OF INCISIONAL HERNIA N/A 2023    Procedure: ROBOTIC REPAIR, HERNIA, INCISIONAL;  Surgeon: Nj Penaloza MD;  Location: [facility] OR;  Service: General;  Laterality: N/A;    SHOULDER ARTHROSCOPY Bilateral     STOMACH SURGERY      Possible Nissen per pt- To treat acid reflux     TUBAL LIGATION  1990   --LSC sigmoidectomy  for diverticulitis  --robotic  hernia repair (umbilical)   --LSC praveen  --LSC ?Nissen fund    Hysterectomy: Yes   Date: .  Indication: ovarian mass--benign teratoma.    Type: xlap/Pfannenstiel   Cervix present: No  Ovaries present: Yes - 1 with cyst was removed, part of other was left in place  Other procedures at time of hysterectomy:  none    Past Ob History     x 2.  C/s x 1 (NRFHT).    Largest infant weight: 8#7oz  no FAVD. yes episiotomy.      Gynecologic History  LMP: No LMP recorded. Patient is postmenopausal.  Age of menarche: 11 yo  Age of menopause: with hyst  Menstrual history: h/o menorrhagia/dysmenorrhea  Pap test: post hyst.  History of abnormal paps: No.  History of STIs:  No  Mammogram: Date of last: 1023.  Result: Normal  Colonoscopy: Date of last: .  Result:  diverticulitis with abscess--then had sigmoidoscopy.  Repeat due:  now.    DEXA:  Date of last: .  Result:  normal.  Repeat due:  per PCP.     Family History  Family History   Problem Relation Name Age of Onset    Coronary artery disease Mother      Hyperlipidemia Mother      Other Mother           Breast Nodules    Arthritis Mother      Hypertension Father      Atrial fibrillation Father      Neuropathy Father      Other Father          Bypass    Diabetes Father      Hypertension Sister      Cancer Sister          Breast Cacner    Neuropathy Sister      Breast cancer Sister      Hypertension Daughter      No Known Problems Son      Diabetes Maternal Grandmother      Hypertension Maternal Grandmother      No Known Problems Maternal Grandfather      Coronary artery disease Paternal Grandmother      Cancer Paternal Grandmother          Breast Cancer    Heart attack Paternal Grandmother      Breast cancer Paternal Grandmother      Dementia Paternal Grandfather      Heart attack Paternal Grandfather      No Known Problems Sister      Other Daughter          Benign Hypertension(Brain)    Breast cancer Maternal Aunt      Breast cancer Paternal Aunt        Colon CA: Yes - PU  Breast CA: Yes - OLIVE CHNI, sister (not sure has had genetic testing)  GYN CA: No   CA: No    Social History  Social History     Tobacco Use   Smoking Status Never   Smokeless Tobacco Never     Social History     Substance and Sexual Activity   Alcohol Use Yes    Alcohol/week: 3.0 standard drinks of alcohol    Types: 3 Glasses of wine per week    Comment: 3 glasses of wine per week    .    Social History     Substance and Sexual Activity   Drug Use Never     The patient is .  Resides in [place] [address].  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      Allergies  Review of patient's allergies indicates:   Allergen Reactions    Meperidine Other (See Comments)     MAKES HER VERY EVIL    Oxycodone-acetaminophen Itching    Adhesive Hives    Lisinopril Other (See Comments)     Chest pain    Meloxicam Other (See Comments)     LEGS SWELL       Medications  Current Outpatient Medications on File Prior to Visit   Medication Sig Dispense Refill    aflibercept (EYLEA) 2 mg/0.05 mL Soln 2 mg by Intravitreal route.      albuterol  (PROVENTIL/VENTOLIN HFA) 90 mcg/actuation inhaler Inhale 1-2 puffs into the lungs every 4 to 6 hours as needed for Wheezing or Shortness of Breath. Rescue 18 g 3    amLODIPine (NORVASC) 2.5 MG tablet TAKE 1 TABLET DAILY 90 tablet 0    aspirin (ECOTRIN) 81 MG EC tablet Take 81 mg by mouth once daily.      cetirizine 10 mg chewable tablet Take 10 mg by mouth once daily.      cholecalciferol, vitamin D3, 125 mcg (5,000 unit) Tab 5,000 Units once daily.       cyanocobalamin, vitamin B-12, 5,000 mcg TbDL Take 5,000 Units by mouth once daily.       icosapent ethyL (VASCEPA) 1 gram Cap TAKE 2 CAPSULES TWICE A  capsule 0    ketorolac 0.4% (ACULAR) 0.4 % Drop Place 1 drop into both eyes 4 (four) times daily.      magnesium oxide 500 mg Cap Take 1 capsule by mouth once daily.      multivitamin capsule Take 1 capsule by mouth once daily.      nebivoloL (BYSTOLIC) 5 MG Tab TAKE 1 TABLET DAILY 90 tablet 0    nystatin-triamcinolone (MYCOLOG II) cream Apply topically 2 (two) times daily. 60 g 2    progesterone (PROMETRIUM) 200 MG capsule Take 200 mg by mouth every evening.      salmeteroL (SEREVENT) 50 mcg/dose diskus inhaler Inhale 1 puff into the lungs 2 (two) times daily. Controller 3 each 3    triamterene-hydrochlorothiazide 37.5-25 mg (MAXZIDE-25) 37.5-25 mg per tablet Take 1 tablet by mouth once daily. 90 tablet 1    turmeric root extract 500 mg Cap Take 500 mg by mouth once daily.       HYDROcodone-acetaminophen (NORCO) 5-325 mg per tablet Take 1 tablet by mouth every 6 (six) hours as needed for Pain. (Patient not taking: Reported on 10/14/2024) 10 tablet 0    LUCENTIS 0.5 mg/0.05 mL Syrg injection       phentermine (ADIPEX-P) 37.5 mg tablet Take 1 tablet (37.5 mg total) by mouth once daily. 90 tablet 0     No current facility-administered medications on file prior to visit.       Review of Systems A 14 point ROS was reviewed with pertinent positives as noted above in the history of present illness.   "    Constitutional: negative  Eyes: negative  Endocrine: negative  Gastrointestinal: negative  Cardiovascular: negative  Respiratory: negative  Allergic/Immunologic: negative  Integumentary: negative  Psychiatric: negative  Musculoskeletal: negative   Ear/Nose/Throat: negative  Neurologic: negative  Genitourinary: SEE HPI  Hematologic/Lymphatic: negative   Breast: negative    Urogynecologic Exam  /76   Pulse 64   Ht 5' 4" (1.626 m)   Wt 76.3 kg (168 lb 3.4 oz)   BMI 28.87 kg/m²     GENERAL APPEARANCE:  The patient is well-developed, well-nourished.   Neck:  Supple with no thyromegaly, no carotid bruits.  Heart:  Regular rate and rhythm, no murmurs, rubs or gallops.  Lungs:  Clear.  No CVA tenderness.  Abdomen:  Soft, nontender, nondistended, no hepatosplenomegaly.  Incisions:  Pfannenstiel, LSC well-healed    PELVIC:    External genitalia:  Normal Bartholins, Skenes and labia bilaterally.    Urethra:  No caruncle, diverticulum or masses.  (+) hypermobility.    Vagina:  Atrophy (+) , no bladder masses or tender, no discharge.    Cervix:  absent  Uterus: uterus absent  Adnexa: Not palpable.    POP-Q:  Aa -1; Ba -1; C -5; Ap +1; Bp +1.  Genital hiatus 4, perineal body 3, total vaginal length 10.    NEUROLOGIC:  Cranial nerves 2 through 12 intact.  Strength 5/5.  DTRs 2+ lower extremities.  S2 through 4 normal.  Sacral reflexes intact.    EXT: FREEMAN, 2+ pulses bilaterally, no C/C/E    COUGH STRESS TEST:  negative  KEGEL: 1 /5    RECTAL:    External:  Normal, (--) hemorrhoids, (--) dovetailing.   Internal:   (--) tenderness, (--) masses, Normal resting tone, Normal active tone. Grossly heme NEG. No rectal prolapse.     PVR: 60 mL    Impression    1. History of open sigmoidectomy    2. History of diverticulitis        Initial Plan  The patient was counseled regarding these issues. The patient was given a summary sheet containing each of these issues with possible options for evaluation and management. When " appropriate, we also reviewed computer-generated diagrams specific to their diagnoses..  All questions were addressed to the patient's satisfaction.    1)  Stage 2 cystocele/rectocele, stage 1 vaginal vault prolapse:  --discussed  --options: do nothing vs PT vs pessary vs surgery   --if pessary: would moisturize for a few weeks before, the continue; consider different shape/size if needed   --if surgery: robotic sacral colpopexy; could also try to remove the piece of ovary if can be found; consider posterior repair at end if needed    --watch umbilical mesh   --if surgery bladder testing to see if you need sling or urethral bulking for stress incontinence   --if surgery:  clearance per NP [place] and cards (Waldo Jones) + labs (CBC, CMP, T&S)/EKG   --if surgery:      2)  Mixed urinary incontinence, urge = stress:    --urine C&S  --Empty bladder every 3 hours.  Empty well: wait a minute, lean forward on toilet.    --Avoid dietary irritants (see sheet).  Keep diary x 3-5 days to determine your irritants.  --KEGELS: do 10 in AM and 10 in PM, holding each x 10 seconds.  When you feel urge to go, STOP, KEGEL, and when urge has passed, then go to bathroom.  Consider PT in future.    --URGE: consider medication if needed. Takes 2-4 weeks to see if will have effect.  For dry mouth: get sour, sugar free lozenge or gum.    --STRESS:  Pessary vs. Sling vs bulking.     3)  Vaginal atrophy (dryness):    --start vaginal estrogen:   --will send to [facility] to see if can mix with less irritating base:    [facility] (they deliver)  [address], [place] [address] [phone]    --Use dime-sized amount with finger (as far as can reach internally) nightly x 2 weeks, then twice a week thereafter.  You can also apply a dime-sized amount with your finger around the vaginal opening and inner lips at same frequency.     --Vaginal estrogen may help to decrease pain related to dryness with intercourse and urinary symptoms  (urgency/frequency/UTIs) around menopause.      --can use the following as needed/if feels irritated:  --coconut oil: dime-sized amount with finger (as far as can reach internally) and around the vaginal opening and inner lips up to nightly as needed    4)  Constipation:  --hydrate well  --continue daily magnesium  Controlling constipation may help bladder urgency/leakage and fiber may better control cholesterol and blood glucose.  Start daily fiber.  Take 1 tsp of fiber powder (psyllium or other sugar-free powder).  Mix in 8 oz of water.  Take x 3-5 days.  Then, increase fiber by 1 tsp every 3-5 days until stool is easy to pass.  Stop and continue at that dose.   Do not exceed 6 tsps/day.  May also use over the counter stool softener 1-2 x/day.  AVOID laxatives.  --GOAL: start BMs without major straining   --incomplete defecation may still be present due to rectocele  --go to pelvic floor PT to work on minimizing straining with bowel movements. Call to make appt:  [facility] (all take Medicaid):  TAYLOR Perduell: [address]  p) [phone]    5)  H/o diverticulitis s/p LSC sigmoidectomy:  --CRS consult to get plugged in for routine follow up/scopes  --would like NS follow up: [facility] in [place]    6)  h/o breast CA + colon CA:  --talk with sister to see if has genetic testing  --would strongly suggest genetic testing,lexi with breast and colon CA together    7)  Will have surgery scheduler call you in about 1-2 weeks.     Approximately *** min were spent in consult, *** % in discussion.     Thank you for requesting consultation of your patient.  I look forward to participating in their care.    Anisha Swartz  Female Pelvic Medicine and Reconstructive Surgery  [facility] [place], [place]

## 2024-10-15 LAB — BACTERIA UR CULT: NO GROWTH

## 2024-10-16 ENCOUNTER — OFFICE VISIT (OUTPATIENT)
Dept: SURGERY | Facility: CLINIC | Age: 59
End: 2024-10-16
Payer: COMMERCIAL

## 2024-10-16 VITALS
RESPIRATION RATE: 16 BRPM | HEIGHT: 64 IN | DIASTOLIC BLOOD PRESSURE: 66 MMHG | OXYGEN SATURATION: 99 % | WEIGHT: 167.75 LBS | BODY MASS INDEX: 28.64 KG/M2 | HEART RATE: 63 BPM | TEMPERATURE: 98 F | SYSTOLIC BLOOD PRESSURE: 118 MMHG

## 2024-10-16 DIAGNOSIS — Z98.890 HISTORY OF OPEN SIGMOIDECTOMY: ICD-10-CM

## 2024-10-16 DIAGNOSIS — K59.04 CHRONIC IDIOPATHIC CONSTIPATION: Primary | ICD-10-CM

## 2024-10-16 DIAGNOSIS — Z90.49 HISTORY OF OPEN SIGMOIDECTOMY: ICD-10-CM

## 2024-10-16 DIAGNOSIS — Z87.19 HISTORY OF DIVERTICULITIS: ICD-10-CM

## 2024-10-16 PROCEDURE — 99203 OFFICE O/P NEW LOW 30 MIN: CPT | Mod: S$GLB,,, | Performed by: COLON & RECTAL SURGERY

## 2024-10-16 PROCEDURE — 99999 PR PBB SHADOW E&M-EST. PATIENT-LVL V: CPT | Mod: PBBFAC,,, | Performed by: COLON & RECTAL SURGERY

## 2024-10-21 ENCOUNTER — DOCUMENTATION ONLY (OUTPATIENT)
Dept: SURGERY | Facility: CLINIC | Age: 59
End: 2024-10-21
Payer: COMMERCIAL

## 2024-10-22 PROBLEM — Z98.890 HISTORY OF OPEN SIGMOIDECTOMY: Status: ACTIVE | Noted: 2024-10-22

## 2024-10-22 PROBLEM — N39.46 URINARY INCONTINENCE, MIXED: Status: ACTIVE | Noted: 2024-10-22

## 2024-10-22 PROBLEM — N81.6 RECTOCELE, FEMALE: Status: ACTIVE | Noted: 2024-10-22

## 2024-10-22 PROBLEM — N81.11 CYSTOCELE, MIDLINE: Status: ACTIVE | Noted: 2024-10-22

## 2024-10-22 PROBLEM — K59.09 CHRONIC CONSTIPATION: Status: ACTIVE | Noted: 2024-10-22

## 2024-10-22 PROBLEM — N81.9 VAGINAL VAULT PROLAPSE: Status: ACTIVE | Noted: 2024-10-22

## 2024-10-22 PROBLEM — Z90.49 HISTORY OF OPEN SIGMOIDECTOMY: Status: ACTIVE | Noted: 2024-10-22

## 2024-10-22 PROBLEM — N95.2 VAGINAL ATROPHY: Status: ACTIVE | Noted: 2024-10-22

## 2024-10-22 PROBLEM — Z87.19 HISTORY OF DIVERTICULITIS: Status: ACTIVE | Noted: 2024-10-22

## 2024-10-23 ENCOUNTER — TELEPHONE (OUTPATIENT)
Dept: UROGYNECOLOGY | Facility: CLINIC | Age: 59
End: 2024-10-23
Payer: COMMERCIAL

## 2024-10-23 NOTE — TELEPHONE ENCOUNTER
Contacted patient- patient reporting would like to try cream and pessary before deciding on surgery. Informed patient will send message to nurse in [place] to get pessary fitting scheduled.       Contacted [facility] for prescription per Dr. Swartz.     ----- Message from Anisha Swartz MD sent at 10/22/2024  9:51 PM CDT -----  Regarding: Can you please call patient to see if she wants to try pessary, schedule surgery, or just wait/watch?  Regarding prolapse and urinary incontinence, patient was trying to decide:  Can you please call patient to see if she wants to try pessary, schedule surgery, or just wait/watch?  Any is ok.   Or, if she's not sure, she can contact us with next steps when ready.     Also, can you please call [facility] and see if they can compound some vaginal estrogen cream with versbase cream.  0.01% estradiol, dispense quantity sufficient for 1 month, si.5-1 g with finger in vagina and around opening/inner lips at night 2x/week.  11 refills.     [facility] (they deliver)  [address], [place] [address] [phone]      Thanks!

## 2024-10-24 ENCOUNTER — TELEPHONE (OUTPATIENT)
Dept: UROGYNECOLOGY | Facility: CLINIC | Age: 59
End: 2024-10-24
Payer: COMMERCIAL

## 2024-10-25 ENCOUNTER — OFFICE VISIT (OUTPATIENT)
Dept: ORTHOPEDICS | Facility: CLINIC | Age: 59
End: 2024-10-25
Payer: COMMERCIAL

## 2024-10-25 VITALS — BODY MASS INDEX: 29.75 KG/M2 | HEIGHT: 63 IN | WEIGHT: 167.88 LBS

## 2024-10-25 DIAGNOSIS — G57.01 PIRIFORMIS SYNDROME, RIGHT: Primary | ICD-10-CM

## 2024-10-25 DIAGNOSIS — M25.551 RIGHT HIP PAIN: ICD-10-CM

## 2024-10-25 PROCEDURE — 99999 PR PBB SHADOW E&M-EST. PATIENT-LVL IV: CPT | Mod: PBBFAC,,, | Performed by: FAMILY MEDICINE

## 2024-10-25 RX ORDER — METHOCARBAMOL 500 MG/1
500 TABLET, FILM COATED ORAL 3 TIMES DAILY PRN
Qty: 30 TABLET | Refills: 2 | Status: SHIPPED | OUTPATIENT
Start: 2024-10-25

## 2024-10-25 NOTE — PROGRESS NOTES
Subjective     Patient ID: Daniela Lawson is a 59 y.o. female.    Chief Complaint: Pain and Follow-up of the Right Hip (Pt here for 1mo R) hip injection f/u. Pt states pain has improved, but still some mild discomfort present . )    Here today for follow-up of right hip pain secondary to piriformis syndrome status post one month from ultrasound-guided injection into the piriformis muscle.  Reports that pain has improved following the injection.  She still has some pain in the area but it is much better than it was.  Some day she reports she has no pain.  She can exercise but has pain with certain movements.  Particularly notes pain with squatting down low in certain lateral movements of the hip.  Has been taking ibuprofen at night that does provide some relief.        Review of Systems   Constitutional: Negative for chills and decreased appetite.   HENT:  Negative for congestion and sore throat.    Eyes:  Negative for blurred vision.   Cardiovascular:  Negative for chest pain, dyspnea on exertion and palpitations.   Respiratory:  Negative for cough and shortness of breath.    Skin:  Negative for rash.   Neurological:  Negative for difficulty with concentration, disturbances in coordination and headaches.   Psychiatric/Behavioral:  Negative for altered mental status, depression, hallucinations, memory loss and suicidal ideas.           Objective     General    Nursing note and vitals reviewed.  Constitutional: She is oriented to person, place, and time. She appears well-developed and well-nourished.   HENT:   Nose: Nose normal.   Eyes: EOM are normal. Pupils are equal, round, and reactive to light.   Neck: Neck supple.   Cardiovascular:  Normal rate.            Pulmonary/Chest: Effort normal.   Abdominal: Soft.   Neurological: She is alert and oriented to person, place, and time. She has normal reflexes.   Psychiatric: She has a normal mood and affect. Her behavior is normal. Judgment and thought content normal.              Right Hip Exam     Inspection   Scars: absent  Swelling: absent  No deformity of hip.    Tenderness   The patient tender to palpation of the piriformis.    Range of Motion   Extension:  20   Flexion:  140   External rotation:  80   Internal rotation:  30     Muscle Strength   The patient has normal right hip strength.    Tests   Pain w/ forced internal rotation (SHAN): absent  Pain w/ forced external rotation (FADIR): present  Clarence: negative  Circumduction test: negative  Log Roll: negative  Snapping Hip (internal): negative    Other   Sensation: normal    Comments:  Positive piriformis sign  Left Hip Exam   Left hip exam is normal.    Range of Motion   The patient has normal left hip ROM.    Muscle Strength   The patient has normal left hip strength.           Vascular Exam     Right Pulses  Dorsalis Pedis:      2+            Physical Exam  Vitals and nursing note reviewed.   Constitutional:       Appearance: She is well-developed and well-nourished.   HENT:      Nose: Nose normal.   Eyes:      Extraocular Movements: EOM normal.      Pupils: Pupils are equal, round, and reactive to light.   Cardiovascular:      Rate and Rhythm: Normal rate.      Pulses:           Dorsalis pedis pulses are 2+ on the right side.   Pulmonary:      Effort: Pulmonary effort is normal.   Abdominal:      Palpations: Abdomen is soft.   Musculoskeletal:      Cervical back: Neck supple.      Right hip: No swelling or deformity.   Neurological:      Mental Status: She is alert and oriented to person, place, and time.      Deep Tendon Reflexes: Reflexes are normal and symmetric.   Psychiatric:         Mood and Affect: Mood and affect normal.         Behavior: Behavior normal.         Thought Content: Thought content normal.         Judgment: Judgment normal.             Assessment and Plan     Encounter Diagnoses   Name Primary?    Piriformis syndrome, right Yes    Right hip pain          Daniela was seen today for pain and  follow-up.    Diagnoses and all orders for this visit:    Piriformis syndrome, right  -     Ambulatory referral/consult to Physical/Occupational Therapy; Future    Right hip pain  -     Ambulatory referral/consult to Physical/Occupational Therapy; Future    Other orders  -     methocarbamoL (ROBAXIN) 500 MG Tab; Take 1 tablet (500 mg total) by mouth 3 (three) times daily as needed (hip tightness).      She has improved but still has some dysfunction with pain of the piriformis muscle.  I recommend she attend physical therapy for this.  I will also prescribe her Robaxin she can take as needed especially at night if she continues to experience pain.  If she is still having some significant symptoms within two months I recommend she follow up here in at that point we can consider a repeat injection.

## 2024-11-05 ENCOUNTER — CLINICAL SUPPORT (OUTPATIENT)
Dept: REHABILITATION | Facility: HOSPITAL | Age: 59
End: 2024-11-05
Payer: COMMERCIAL

## 2024-11-05 DIAGNOSIS — M25.551 RIGHT HIP PAIN: ICD-10-CM

## 2024-11-05 DIAGNOSIS — R29.898 WEAKNESS OF RIGHT HIP: ICD-10-CM

## 2024-11-05 DIAGNOSIS — M53.86 DECREASED RANGE OF MOTION OF LUMBAR SPINE: Primary | ICD-10-CM

## 2024-11-05 DIAGNOSIS — G57.01 PIRIFORMIS SYNDROME, RIGHT: ICD-10-CM

## 2024-11-05 PROCEDURE — 97162 PT EVAL MOD COMPLEX 30 MIN: CPT | Mod: PN

## 2024-11-05 PROCEDURE — 97530 THERAPEUTIC ACTIVITIES: CPT | Mod: PN

## 2024-11-05 NOTE — PLAN OF CARE
"[facility]   Physical Therapy Initial Evaluation      Name: Daniela Lawson  Clinic Number: [id number]    Therapy Diagnosis:   Encounter Diagnoses   Name Primary?    Piriformis syndrome, right     Right hip pain         Physician: Miguel Tracy MD    Physician Orders: {AMB PT KNEE ORDERS:16349} ***  Medical Diagnosis from Referral: ***  Evaluation Date: 2024  Authorization Period Expiration: ***  Plan of Care Expiration: ***  Progress Note Due: ***  Visit # / Visits authorized: ***/ ***   FOTO: ***/***    Precautions: {IP WOUND PRECAUTIONS OHS:16324}     Time In: ***  Time Out: ***  Total Appointment Time (timed & untimed codes): *** minutes    Subjective     Date of onset: ***    History of current condition - Daniela reports: ***    Falls: ***    Imaging: {Mri/ctscan/bone scan:10261}: ***    Prior Therapy: ***  Social History: *** {LIVES WITH:65811}  Occupation: ***  Prior Level of Function: ***  Current Level of Function: ***    Pain:  Current {0-10:90535::"0"}/10, worst {0-10:63640::"0"}/10, best {0-10:18489::"0"}/10   Location: {RIGHT LEFT BILATERAL:56441} {LOCATION ON BODY:53566} {Pain Loc:57768}  Description: {Pain Description:96927}  Aggravating Factors: {Causes; Pain:21834}  Easing Factors: {Pain (activities that relieve):09261}    Patients goals: ***     Medical History:   Past Medical History:   Diagnosis Date    Anesthesia 2009    Pt reports severe drop in BP when anesthesia given.    Arthritis     Asthma     Diverticulitis     GERD (gastroesophageal reflux disease)     Hypertension     Neuropathy 2007    PONV (postoperative nausea and vomiting)     Stroke     RETINACULAR     Wears glasses        Surgical History:   Daniela Lawson  has a past surgical history that includes  section (1990); Hysterectomy (); Tubal ligation (1990); Shoulder arthroscopy (Bilateral); Cholecystectomy; Stomach surgery; Laparoscopic sigmoidectomy (N/A, 10/16/2019); " Insertion of ureteral catheter (N/A, 10/16/2019); and Robot-assisted laparoscopic repair of incisional hernia (N/A, 11/16/2023).    Medications:   Daniela has a current medication list which includes the following prescription(s): eylea, albuterol, amlodipine, aspirin, cetirizine, cholecalciferol (vitamin d3), cyanocobalamin (vitamin b-12), icosapent ethyl, ketorolac 0.4%, magnesium oxide, methocarbamol, multivitamin, nebivolol, nystatin-triamcinolone, progesterone, salmeterol 50 mcg/dose, triamterene-hydrochlorothiazide 37.5-25 mg, and turmeric root extract.    Allergies:   Review of patient's allergies indicates:   Allergen Reactions    Meperidine Other (See Comments)     MAKES HER VERY EVIL    Oxycodone-acetaminophen Itching    Lisinopril Other (See Comments)     Chest pain    Meloxicam Other (See Comments)     LEGS SWELL    Adhesive Hives        Objective      ***    Intake Outcome Measure for FOTO *** Survey    Therapist reviewed FOTO scores for Daniela Lawson on 11/5/2024.   FOTO documents entered into [employer] - see Media section.    Intake Score: ***%         Treatment     Total Treatment time (time-based codes) separate from Evaluation: *** minutes     Daniela received the treatments listed below:      therapeutic exercises to develop {AMB PT PROGRESS OBJECTIVE:80311} for *** minutes including:  ***    manual therapy techniques: {AMB PT PROGRESS MANUAL THERAPY:41912} were applied to the: *** for *** minutes, including:  ***    neuromuscular re-education activities to improve: {AMB PT PROGRESS NEURO RE-ED:90942} for *** minutes. The following activities were included:  ***    therapeutic activities to improve functional performance for ***  minutes, including:  ***    gait training to improve functional mobility and safety for ***  minutes, including:  ***    direct contact modalities after being cleared for contraindications: {AMB PT PROGRESS DIRECT CONTACT MODES:54004}    supervised modalities after being  "cleared for contradictions: {AMB PT SUPERVISED MODES:93175}    hot pack for *** minutes to ***.    cold pack for *** minutes to ***.    Patient Education and Home Exercises     Education provided:   - ***    Written Home Exercises Provided: {Blank single:83810::"yes","Patient instructed to cont prior HEP"}. Exercises were reviewed and Daniela was able to demonstrate them prior to the end of the session.  Daniela demonstrated {Desc; good/fair/poor:10302} understanding of the education provided. See EMR under Patient Instructions for exercises provided during therapy sessions.    Assessment     Daniela is a 59 y.o. female referred to outpatient Physical Therapy with a medical diagnosis of ***. Patient presents with ***    Patient prognosis is {REHAB PROGNOSIS OHS:69485}.   Patient will benefit from skilled outpatient Physical Therapy to address the deficits stated above and in the chart below, provide patient /family education, and to maximize patientt's level of independence.     Plan of care discussed with patient: {YES:94078}  Patient's spiritual, cultural and educational needs considered and patient is agreeable to the plan of care and goals as stated below:     Anticipated Barriers for therapy: ***    Medical Necessity is demonstrated by the following  History  Co-morbidities and personal factors that may impact the plan of care [] LOW: no personal factors / co-morbidities  [] MODERATE: 1-2 personal factors / co-morbidities  [] HIGH: 3+ personal factors / co-morbidities    Moderate / High Support Documentation:   Co-morbidities affecting plan of care: ***    Personal Factors:   {Personal Factors:10781}     Examination  Body Structures and Functions, activity limitations and participation restrictions that may impact the plan of care [] LOW: addressing 1-2 elements  [] MODERATE: 3+ elements  [] HIGH: 4+ elements (please support below)    Moderate / High Support Documentation: ***     Clinical Presentation [] LOW: " "stable  [] MODERATE: Evolving  [] HIGH: Unstable     Decision Making/ Complexity Score: {Desc; low/moderate/high:883762}       Goals:  Short Term Goals: *** weeks   ***    Long Term Goals: *** weeks   ***  Plan     Plan of care Certification: 11/5/2024 to ***.    Outpatient Physical Therapy {NUMBERS 1-5:92455} times weekly for {0-10:78507::"0"} weeks to include the following interventions: {TX PLAN:28806}.     Kevin Richardson, PT   "

## 2024-11-07 ENCOUNTER — PATIENT MESSAGE (OUTPATIENT)
Dept: FAMILY MEDICINE | Facility: CLINIC | Age: 59
End: 2024-11-07
Payer: COMMERCIAL

## 2024-11-07 PROBLEM — M53.86 DECREASED RANGE OF MOTION OF LUMBAR SPINE: Status: ACTIVE | Noted: 2024-11-07

## 2024-11-07 PROBLEM — R29.898 WEAKNESS OF RIGHT HIP: Status: ACTIVE | Noted: 2024-11-07

## 2024-11-07 NOTE — PLAN OF CARE
[facility]   Physical Therapy Initial Evaluation      Name: Daniela Lawson  Clinic Number: [id number]    Therapy Diagnosis:   Encounter Diagnoses   Name Primary?    Piriformis syndrome, right     Right hip pain     Decreased range of motion of lumbar spine Yes    Weakness of right hip         Physician: Miguel Tracy MD    Physician Orders: PT Eval and Treat   Medical Diagnosis from Referral:   G57.01 (ICD-10-CM) - Piriformis syndrome, right   M25.551 (ICD-10-CM) - Right hip pain     Evaluation Date: 11/5/2024  Authorization Period Expiration: 12/31/2024  Plan of Care Expiration: 01/05/2025  Progress Note Due: 12/05/2024  Visit # / Visits authorized: 1/ 1   FOTO: 1/3    Precautions: Standard and past medical history listed in evaluation       Time In: 1100  Time Out: 1140  Total Appointment Time (timed & untimed codes): 40 minutes    Subjective     Date of onset: years, but most recently several months ago     History of current condition - Daniela reports: she has had Right hip pain in the past that she had injections for which helped. This time around her pain began several months ago, she does not recall any specific mechanism of injury, however she does workout in group classes and she could have done something there. It hurts to lay on her Right side and especially if she has to put all of her weight through her Right leg when performing exercises such as step ups. Her pain is in her lateral hip to her glute. She denies radicular symptoms. She received an ultrasound guided injection from Dr. Tracy and when she followed up with him he recommended PT.     Falls: no    Imaging: x-ray: Impression:     Negative x-rays of the right hip and pelvis.    Prior Therapy: not for this condition   Social History:  lives with their family  Occupation: retired nurse   Prior Level of Function: chronic intermittent Right hip pain  Current Level of Function: Right hip pain with stairs, step ups,  ambulating, and lying on her Right side     Pain:  Current 2/10, worst 7/10, best 2/10   Location: right lateral hip and glute    Description: Aching and Sharp  Aggravating Factors: Standing, Walking, Lifting, and step ups, stairs  Easing Factors: rest    Patients goals: to decrease Right hip pain and return to prior level of function be able to participate in full workout classes without modifying due to her hip      Medical History:   Past Medical History:   Diagnosis Date    Anesthesia     Pt reports severe drop in BP when anesthesia given.    Arthritis     Asthma     Diverticulitis     GERD (gastroesophageal reflux disease)     Hypertension     Neuropathy     PONV (postoperative nausea and vomiting)     Stroke     RETINACULAR     Wears glasses        Surgical History:   Daniela Lawson  has a past surgical history that includes  section (1990); Hysterectomy (); Tubal ligation (1990); Shoulder arthroscopy (Bilateral); Cholecystectomy; Stomach surgery; Laparoscopic sigmoidectomy (N/A, 10/16/2019); Insertion of ureteral catheter (N/A, 10/16/2019); and Robot-assisted laparoscopic repair of incisional hernia (N/A, 2023).    Medications:   Daniela has a current medication list which includes the following prescription(s): eylea, albuterol, amlodipine, aspirin, cetirizine, cholecalciferol (vitamin d3), cyanocobalamin (vitamin b-12), icosapent ethyl, ketorolac 0.4%, magnesium oxide, methocarbamol, multivitamin, nebivolol, nystatin-triamcinolone, progesterone, salmeterol 50 mcg/dose, triamterene-hydrochlorothiazide 37.5-25 mg, and turmeric root extract.    Allergies:   Review of patient's allergies indicates:   Allergen Reactions    Meperidine Other (See Comments)     MAKES HER VERY EVIL    Oxycodone-acetaminophen Itching    Lisinopril Other (See Comments)     Chest pain    Meloxicam Other (See Comments)     LEGS SWELL    Adhesive Hives        Objective      Observation: Bilateral  trendelenberg > when in Right stance             Myotomes Right Left Comments   L2 5/5 5/5     L3 5/5 5/5     L4 5/5 5/5     L5 5/5 5/5     S1 5/5 5/5     S2 5/5 5/5       Lumbar Range of Motion:    Limitations Pain Normal   Flexion 25%   no      40-50 degrees   Extension 35%   Yes Right side low back       20 degrees   Left Side Bending 25%  no      20 degrees   Right Side Bending 25% Yes Right side low back       20 degrees      Hip Passive Range of Motion:    Right  Left  Normal   Flexion 90 90 120 degrees    Extension 10 10 20 degrees    Ext. Rotation 45 45 45-60 degrees   Int. Rotation 35 35 35 degrees        Lower Extremity Strength  Right LE  Left LE    Quadriceps: 5/5 Quadriceps: 5/5   Hamstrings: 5/5 Hamstrings: 5/5   Iliospoas: 4/5 Iliospoas: 4/5   Hip extension:  3-/5 painful Hip extension: 3-/5   Hip abduction:  3-/5 Hip abduction:  4/5   Hip ER:  4-/5 Hip ER: 4/5   Hip IR: 5/5 Hip IR: 5/5   Ankle dorsiflexion: 5/5 Ankle dorsiflexion: 5/5       Special Tests:  -SLR Test: positive for Right hip pain with Plantar flexion and inversion   - FADIR: negative  - SHAN: Positive     SI Special Tests:   Distraction: negative  Compression: negative  Sacral thrust: negative      Joint Mobility:   -Segmental mobility testing: limited lower lumbar mobility with segmental rocking    Palpation: TTP to insertion of Right glute med               Treatment     Total Treatment time (time-based codes) separate from Evaluation: 10 minutes     Daniela received the treatments listed below:        therapeutic activities to improve functional performance for 10  minutes, including:    PT educated pt on condition, prognosis, and plan of care.     PT educated pt on and provided pt HEP consisting of:          Patient Education and Home Exercises     Education provided:   - Home Exercise Program to be performed twice daily     Written Home Exercises Provided: yes. Exercises were reviewed and Daniela was able to demonstrate them  prior to the end of the session.  Daniela demonstrated good  understanding of the education provided. See EMR under Patient Instructions for exercises provided during therapy sessions.    Assessment     Daniela is a 59 y.o. female referred to outpatient Physical Therapy with a medical diagnosis of piriformis syndrome and Right hip pain. Patient presents with Right lateral hip pain and weakness consistent with glute med tendinopathy. Her symptoms and deficits are limiting her ability to perform community ambulation, stair negotiation, squatting activities, and household chores.     Patient prognosis is Good.   Patient will benefit from skilled outpatient Physical Therapy to address the deficits stated above and in the chart below, provide patient /family education, and to maximize patientt's level of independence.     Plan of care discussed with patient: Yes  Patient's spiritual, cultural and educational needs considered and patient is agreeable to the plan of care and goals as stated below:     Anticipated Barriers for therapy: chronicity of condition     Medical Necessity is demonstrated by the following  History  Co-morbidities and personal factors that may impact the plan of care [] LOW: no personal factors / co-morbidities  [x] MODERATE: 1-2 personal factors / co-morbidities  [] HIGH: 3+ personal factors / co-morbidities    Moderate / High Support Documentation:   Co-morbidities affecting plan of care:   Anesthesia    Pt reports severe drop in BP when anesthesia given.   Arthritis    Asthma    Diverticulitis    GERD (gastroesophageal reflux disease)    Hypertension    Neuropathy    PONV (postoperative nausea and vomiting)    Stroke    RETINACULAR    Wears glasses        Personal Factors:   no deficits     Examination  Body Structures and Functions, activity limitations and participation restrictions that may impact the plan of care [] LOW: addressing 1-2 elements  [x] MODERATE: 3+ elements  [] HIGH: 4+ elements  (please support below)    Moderate / High Support Documentation: community ambulation, stair negotiation, squatting activities, and household chores     Clinical Presentation [] LOW: stable  [x] MODERATE: Evolving  [] HIGH: Unstable     Decision Making/ Complexity Score: moderate       Goals:  Short Term Goals: 4 weeks   Pt will be IND with initial HEP to manage symptoms outside of PT.   Pt will report Right hip pain improved by >/= 50% with squating to demonstrate improved condition.   Pt will improve MMT of lower extremity strength deficits by >/= 1/5 to improve tolerance for progressing rehab.   Pt will demonstrate full pain free lumbar Range of Motion to offloadRight hip.     Long Term Goals: 6-8 weeks   Pt will improve FOTO score to >/= 67 to demonstrate improved functional mobility.  Pt will be IND with final HEP to maintain/improve strength and mobility gained in PT.   Pt will report Right hip pain improved by >/= 100% with step ups and workout classes to demonstrate improved condition.   Pt will improve MMT of lower extremity strength deficits to >/= 4+/ 5 to improve tolerance for lifting/carrying activities. .   Pt goal:  Pt will report confidence in managing her condition upon discharge from PT.   Plan     Plan of care Certification: 11/5/2024 to 01/05/2025.    Outpatient Physical Therapy 1-2 times weekly for 8 weeks to include the following interventions: Gait Training, Manual Therapy, Moist Heat/ Ice, Neuromuscular Re-ed, Patient Education, Therapeutic Activities, and Therapeutic Exercise.     Kevin Richardson, PT, DPT   Board Certified Clinical Specialist in Orthopedic Physical Therapy  Board Certified Clinical Specialist in Sports Physical Therapy.sig

## 2024-11-12 ENCOUNTER — CLINICAL SUPPORT (OUTPATIENT)
Dept: REHABILITATION | Facility: HOSPITAL | Age: 59
End: 2024-11-12
Payer: COMMERCIAL

## 2024-11-12 DIAGNOSIS — M53.86 DECREASED RANGE OF MOTION OF LUMBAR SPINE: Primary | ICD-10-CM

## 2024-11-12 DIAGNOSIS — R29.898 WEAKNESS OF RIGHT HIP: ICD-10-CM

## 2024-11-12 PROCEDURE — 97140 MANUAL THERAPY 1/> REGIONS: CPT | Mod: PN

## 2024-11-12 PROCEDURE — 97530 THERAPEUTIC ACTIVITIES: CPT | Mod: PN

## 2024-11-12 PROCEDURE — 97112 NEUROMUSCULAR REEDUCATION: CPT | Mod: PN

## 2024-11-12 PROCEDURE — 97110 THERAPEUTIC EXERCISES: CPT | Mod: PN

## 2024-11-12 NOTE — PROGRESS NOTES
[facility]   Physical Therapy Treatment Note     Name: Daniela Lawson  Clinic Number: [id number]    Therapy Diagnosis:   Encounter Diagnoses   Name Primary?    Decreased range of motion of lumbar spine Yes    Weakness of right hip      Physician: Miguel Tracy MD    Visit Date: 11/12/2024    Physician Orders: PT Eval and Treat   Medical Diagnosis from Referral:   G57.01 (ICD-10-CM) - Piriformis syndrome, right   M25.551 (ICD-10-CM) - Right hip pain      Evaluation Date: 11/5/2024  Authorization Period Expiration: 12/31/2024  Plan of Care Expiration: 01/05/2025  Progress Note Due: 12/05/2024  Visit # / Visits authorized: 1/20  FOTO: 1/3     Precautions: Standard and past medical history listed in evaluation       PTA Visit #: 0/5     FOTO first follow up:   FOTO second follow up:     Time In: 0802  Time Out: 0850  Total Billable Time: 44 minutes    SUBJECTIVE     Pt reports: her hip is doing ok she was able to .  She was compliant with home exercise program.  Response to previous treatment: first follow up   Functional change: first follow up     Pain: 2/10  Location: right lateral hip       OBJECTIVE     Objective Measures updated at progress report unless specified.     Treatment   PT extender available to assist with treatment as needed     Daniela received the treatments listed below:      therapeutic exercises to develop strength, endurance, and ROM for 8 minutes including:    Upright bike x8mins level 3     manual therapy techniques: Joint mobilizations were applied to the: Right hip for 8 minutes, including:    Lateral distraction with mob belt grade III-IV   Long axis distraction grade III-IV     neuromuscular re-education activities to improve: glute activation for 16 minutes. The following activities were included:    Hip abduction isometric with Red Theraband 5x45s hold with 2 min rest break between sets   On 2 minute rest break performed 20 bridges       therapeutic activities  to improve functional performance for 20  minutes, including:    Shuttle leg press 75# 3x10   Shuttle single leg press 25# 3x10 each side   Prowler sled push [id number] there and back no weight       Patient Education and Home Exercises     Home Exercises Provided and Patient Education Provided     Education provided:   - Continue Home Exercise Program     Written Home Exercises Provided: Patient instructed to cont prior HEP. Exercises were reviewed and Daniela was able to demonstrate them prior to the end of the session.  Daniela demonstrated good  understanding of the education provided. See EMR under Patient Instructions for exercises provided during therapy sessions    ASSESSMENT     Daniela tolerated first follow up visit well. Focus of interventions were to improve Right hip and lumbar mobility and to progressively load Right glute med. Difficulty if in single leg stance on Right Lower Extremity. Will progress as tolerated.     Daniela Is progressing well towards her goals.   Pt prognosis is Good.     Pt will continue to benefit from skilled outpatient physical therapy to address the deficits listed in the problem list box on initial evaluation, provide pt/family education and to maximize pt's level of independence in the home and community environment.     Pt's spiritual, cultural and educational needs considered and pt agreeable to plan of care and goals.     Anticipated barriers to physical therapy: chronicity of condition     Goals:  Short Term Goals: 4 weeks   Pt will be IND with initial HEP to manage symptoms outside of PT.   Pt will report Right hip pain improved by >/= 50% with squating to demonstrate improved condition.   Pt will improve MMT of lower extremity strength deficits by >/= 1/5 to improve tolerance for progressing rehab.   Pt will demonstrate full pain free lumbar Range of Motion to offload Right hip.      Long Term Goals: 6-8 weeks   Pt will improve FOTO score to >/= 67 to demonstrate  improved functional mobility.  Pt will be IND with final HEP to maintain/improve strength and mobility gained in PT.   Pt will report Right hip pain improved by >/= 100% with step ups and workout classes to demonstrate improved condition.   Pt will improve MMT of lower extremity strength deficits to >/= 4+/ 5 to improve tolerance for lifting/carrying activities. .   Pt goal:  Pt will report confidence in managing her condition upon discharge from PT.    PLAN     Plan of care Certification: 11/5/2024 to 01/05/2025.     Continue plan of care with focus on progressively loading Right hip as tolerated to return to PLOF.     Kevin Richardson, PT, DPT   Board Certified Clinical Specialist in Orthopedic Physical Therapy  Board Certified Clinical Specialist in Sports Physical Therapy

## 2024-11-13 DIAGNOSIS — Z12.31 OTHER SCREENING MAMMOGRAM: ICD-10-CM

## 2024-11-14 ENCOUNTER — HOSPITAL ENCOUNTER (OUTPATIENT)
Dept: RADIOLOGY | Facility: HOSPITAL | Age: 59
Discharge: HOME | End: 2024-11-14
Attending: NURSE PRACTITIONER
Payer: COMMERCIAL

## 2024-11-14 VITALS — WEIGHT: 167 LBS | HEIGHT: 63 IN | BODY MASS INDEX: 29.59 KG/M2

## 2024-11-14 DIAGNOSIS — Z12.31 OTHER SCREENING MAMMOGRAM: ICD-10-CM

## 2024-11-14 PROCEDURE — 77063 BREAST TOMOSYNTHESIS BI: CPT | Mod: TC,PO

## 2024-11-14 PROCEDURE — 77063 BREAST TOMOSYNTHESIS BI: CPT | Mod: 26,,, | Performed by: RADIOLOGY

## 2024-11-14 PROCEDURE — 77067 SCR MAMMO BI INCL CAD: CPT | Mod: 26,,, | Performed by: RADIOLOGY

## 2024-11-18 ENCOUNTER — PATIENT MESSAGE (OUTPATIENT)
Dept: ADMINISTRATIVE | Facility: HOSPITAL | Age: 59
End: 2024-11-18
Payer: COMMERCIAL

## 2024-11-19 ENCOUNTER — CLINICAL SUPPORT (OUTPATIENT)
Dept: REHABILITATION | Facility: HOSPITAL | Age: 59
End: 2024-11-19
Payer: COMMERCIAL

## 2024-11-19 ENCOUNTER — OFFICE VISIT (OUTPATIENT)
Dept: UROGYNECOLOGY | Facility: CLINIC | Age: 59
End: 2024-11-19
Payer: COMMERCIAL

## 2024-11-19 VITALS
BODY MASS INDEX: 29.61 KG/M2 | SYSTOLIC BLOOD PRESSURE: 136 MMHG | HEIGHT: 63 IN | WEIGHT: 167.13 LBS | HEART RATE: 60 BPM | DIASTOLIC BLOOD PRESSURE: 82 MMHG

## 2024-11-19 DIAGNOSIS — N39.46 URINARY INCONTINENCE, MIXED: Primary | ICD-10-CM

## 2024-11-19 DIAGNOSIS — N95.2 ATROPHIC VAGINITIS: ICD-10-CM

## 2024-11-19 DIAGNOSIS — R35.0 URINARY FREQUENCY: ICD-10-CM

## 2024-11-19 DIAGNOSIS — M53.86 DECREASED RANGE OF MOTION OF LUMBAR SPINE: Primary | ICD-10-CM

## 2024-11-19 DIAGNOSIS — N81.9 VAGINAL VAULT PROLAPSE: ICD-10-CM

## 2024-11-19 DIAGNOSIS — N81.6 RECTOCELE: ICD-10-CM

## 2024-11-19 DIAGNOSIS — N81.11 CYSTOCELE, MIDLINE: ICD-10-CM

## 2024-11-19 DIAGNOSIS — R29.898 WEAKNESS OF RIGHT HIP: ICD-10-CM

## 2024-11-19 LAB
BILIRUBIN, UA POC OHS: NEGATIVE
BLOOD, UA POC OHS: NEGATIVE
CLARITY, UA POC OHS: CLEAR
COLOR, UA POC OHS: YELLOW
GLUCOSE, UA POC OHS: NEGATIVE
KETONES, UA POC OHS: NEGATIVE
LEUKOCYTES, UA POC OHS: NEGATIVE
NITRITE, UA POC OHS: NEGATIVE
PH, UA POC OHS: 8.5
PROTEIN, UA POC OHS: NEGATIVE
SPECIFIC GRAVITY, UA POC OHS: 1.02
UROBILINOGEN, UA POC OHS: 0.2

## 2024-11-19 PROCEDURE — 3079F DIAST BP 80-89 MM HG: CPT | Mod: CPTII,S$GLB,, | Performed by: NURSE PRACTITIONER

## 2024-11-19 PROCEDURE — 97140 MANUAL THERAPY 1/> REGIONS: CPT | Mod: PN

## 2024-11-19 PROCEDURE — 1160F RVW MEDS BY RX/DR IN RCRD: CPT | Mod: CPTII,S$GLB,, | Performed by: NURSE PRACTITIONER

## 2024-11-19 PROCEDURE — 99999 PR PBB SHADOW E&M-EST. PATIENT-LVL IV: CPT | Mod: PBBFAC,,, | Performed by: NURSE PRACTITIONER

## 2024-11-19 PROCEDURE — 97530 THERAPEUTIC ACTIVITIES: CPT | Mod: PN

## 2024-11-19 PROCEDURE — 57160 INSERT PESSARY/OTHER DEVICE: CPT | Mod: S$GLB,,, | Performed by: NURSE PRACTITIONER

## 2024-11-19 PROCEDURE — 97112 NEUROMUSCULAR REEDUCATION: CPT | Mod: PN

## 2024-11-19 PROCEDURE — 3008F BODY MASS INDEX DOCD: CPT | Mod: CPTII,S$GLB,, | Performed by: NURSE PRACTITIONER

## 2024-11-19 PROCEDURE — 1159F MED LIST DOCD IN RCRD: CPT | Mod: CPTII,S$GLB,, | Performed by: NURSE PRACTITIONER

## 2024-11-19 PROCEDURE — 99214 OFFICE O/P EST MOD 30 MIN: CPT | Mod: 25,S$GLB,, | Performed by: NURSE PRACTITIONER

## 2024-11-19 PROCEDURE — 81003 URINALYSIS AUTO W/O SCOPE: CPT | Mod: QW,S$GLB,, | Performed by: NURSE PRACTITIONER

## 2024-11-19 PROCEDURE — 3075F SYST BP GE 130 - 139MM HG: CPT | Mod: CPTII,S$GLB,, | Performed by: NURSE PRACTITIONER

## 2024-11-19 NOTE — PROGRESS NOTES
Subjective:       Patient ID: Daniela Lawson is a 59 y.o. female.    Chief Complaint: pessary consult    HPI  Daniela Lawson is a 59 y.o. female who is new to me, presents today for consult in regards to pessary fitting.  She initially went to her OBGYN who tried 2 or 3 ring pessaries in the office.  The pt did not feel that the pessary was comfortable for her and it did not stay in.  She went to see Dr. Swartz who reviewed trying a pessary again or surgical correction.  The pt is not wanting surgical correction at this time.  She has been using a compounded estrace cream and feels that this has been helpful for her.  She was also supposed to try pelvic floor PT, but has not heard from them.  We will replace the referral for this today.  She is interested in trying a pessary again to see if this will be helpful.  She denies any other acute complaints/concerns at this time and is ready to proceed with the pessary.    Review of Systems   Constitutional:  Negative for activity change, fever and unexpected weight change.   HENT:  Negative for hearing loss.    Eyes:  Negative for visual disturbance.   Respiratory:  Negative for shortness of breath and wheezing.    Cardiovascular:  Negative for chest pain, palpitations and leg swelling.   Gastrointestinal:  Negative for abdominal pain, constipation and diarrhea.   Genitourinary:  Positive for frequency and urgency. Negative for dyspareunia, dysuria, vaginal bleeding and vaginal discharge.        Vaginal bulge   Musculoskeletal:  Negative for gait problem and neck pain.   Skin:  Negative for rash and wound.   Allergic/Immunologic: Negative for immunocompromised state.   Neurological:  Negative for tremors, speech difficulty and weakness.   Hematological:  Does not bruise/bleed easily.   Psychiatric/Behavioral:  Negative for agitation and confusion.        Objective:      Physical Exam  Vitals reviewed. Exam conducted with a chaperone present.   Constitutional:        General: She is not in acute distress.     Appearance: She is well-developed.   HENT:      Head: Normocephalic and atraumatic.   Neck:      Thyroid: No thyromegaly.   Pulmonary:      Effort: Pulmonary effort is normal. No respiratory distress.   Abdominal:      Palpations: Abdomen is soft.      Tenderness: There is no abdominal tenderness.      Hernia: No hernia is present.   Musculoskeletal:         General: Normal range of motion.      Cervical back: Normal range of motion.   Skin:     General: Skin is warm and dry.      Findings: No rash.   Neurological:      Mental Status: She is alert and oriented to person, place, and time.   Psychiatric:         Mood and Affect: Mood normal.         Behavior: Behavior normal.         Thought Content: Thought content normal.       Pelvic Exam:  V: No lesions. No palpable nodes.   Va:  No discharge or bleeding.  Tissue is mildly atrophic.  Meatus:No caruncle or stenosis  Urethra: Non tender. No suburethral masses.  Cx/Cuff: Normal   Uterus:  Surgically  Ad: No mass or tenderness.  Levators :Symmetrical. Normal tone. Non tender.  BL: Non tender  RV: No hemorrhoids.        POP-Q:  Aa -1; Ba -1; C -5; Ap +1; Bp +1.  Genital hiatus 4, perineal body 3, total vaginal length 10.       Assessment:       1. Urinary incontinence, mixed    2. Urinary frequency    3. Cystocele, midline    4. Rectocele    5. Vaginal vault prolapse    6. Atrophic vaginitis          Procedure note-  After betadine irrigation of the vagina, a #5 ring pessary was inserted into the vagina.  Patient felt that this was very uncomfortable.  It was removed and a #4 ring pessary was placed.   NP reviewed removal and replacement the pessary with the patient.  She verbalized understanding.  NP had patient do various exercises in the room and patient was able to retain pessary.  Pt was able to void and retain the pessary.  Pt ambulated in the room and denies any discomfort.  NP reminded pt that the first 24-48 hours are  the most likely time for the pessary to fall out and to monitor the toilet before flushing.  Pt verbalized understanding.      Plan:       Urinary incontinence, mixed- pelvic floor PT  -     POCT Urinalysis(Instrument)  -     Ambulatory referral/consult to Physical/Occupational Therapy; Future; Expected date: 11/26/2024    Urinary frequency- pt as noted below  -     Ambulatory referral/consult to Physical/Occupational Therapy; Future; Expected date: 11/26/2024    Cystocele, midline_ trial of ring pessary  -     Ambulatory referral/consult to Physical/Occupational Therapy; Future; Expected date: 11/26/2024    Rectocele- trial of ring pessary    Vaginal vault prolapse- trial of ring pessary    Atrophic vaginitis- continue estrace cream    RTC 1 month

## 2024-11-19 NOTE — PROGRESS NOTES
[facility]   Physical Therapy Treatment Note     Name: Daniela Lawson  Clinic Number: [id number]    Therapy Diagnosis:   Encounter Diagnoses   Name Primary?    Decreased range of motion of lumbar spine Yes    Weakness of right hip      Physician: Miguel Tracy MD    Visit Date: 11/19/2024    Physician Orders: PT Eval and Treat   Medical Diagnosis from Referral:   G57.01 (ICD-10-CM) - Piriformis syndrome, right   M25.551 (ICD-10-CM) - Right hip pain      Evaluation Date: 11/5/2024  Authorization Period Expiration: 12/31/2024  Plan of Care Expiration: 01/05/2025  Progress Note Due: 12/05/2024  Visit # / Visits authorized: 1/20  FOTO: 1/3     Precautions: Standard and past medical history listed in evaluation       PTA Visit #: 0/5     FOTO first follow up:   FOTO second follow up:     Time In: 1300  Time Out: 1345  Total Billable Time: 45 minutes    SUBJECTIVE     Pt reports: hip is doing better with stairs. She was trying reverse lunges the other day at her gym and she had to modify because of hip pain.     She was compliant with home exercise program.  Response to previous treatment: as above  Functional change: as above     Pain: 2/10  Location: right lateral hip       OBJECTIVE     Objective Measures updated at progress report unless specified.     Treatment   PT extender available to assist with treatment as needed     Daniela received the treatments listed below:      therapeutic exercises to develop strength, endurance, and ROM for 0 minutes including:    Upright bike x8mins level 3     manual therapy techniques: Joint mobilizations were applied to the: Right hip for 8 minutes, including:    Lateral distraction with mob belt grade III-IV   Long axis distraction grade III-IV     neuromuscular re-education activities to improve: glute activation for 10 minutes. The following activities were included:    Bridges 3x12 with Red Theraband 3s hold at top   Clamshells Red Theraband 3x12 each  "side   Single Leg balance 3x30s each side     therapeutic activities to improve functional performance for 27  minutes, including:    Shuttle leg press 75# 3x10   Shuttle single leg press 25# 3x10 each side   Prowler sled push [id number] there and back no weight   Step ups on 4" step 3x10 each side   KB deadlift 20# off of 8" st    Patient Education and Home Exercises     Home Exercises Provided and Patient Education Provided     Education provided:   - Continue Home Exercise Program     Written Home Exercises Provided: Patient instructed to cont prior HEP. Exercises were reviewed and Daniela was able to demonstrate them prior to the end of the session.  Daniela demonstrated good  understanding of the education provided. See EMR under Patient Instructions for exercises provided during therapy sessions    ASSESSMENT     Daniela tolerated treatment well. Focus of interventions were to improve Right hip and lumbar mobility and to progressively load Right glute med. Able to progress to isotonics to load Right hip today Will progress as tolerated.     Daniela Is progressing well towards her goals.   Pt prognosis is Good.     Pt will continue to benefit from skilled outpatient physical therapy to address the deficits listed in the problem list box on initial evaluation, provide pt/family education and to maximize pt's level of independence in the home and community environment.     Pt's spiritual, cultural and educational needs considered and pt agreeable to plan of care and goals.     Anticipated barriers to physical therapy: chronicity of condition     Goals:  Short Term Goals: 4 weeks   Pt will be IND with initial HEP to manage symptoms outside of PT.   Pt will report Right hip pain improved by >/= 50% with squating to demonstrate improved condition.   Pt will improve MMT of lower extremity strength deficits by >/= 1/5 to improve tolerance for progressing rehab.   Pt will demonstrate full pain free lumbar Range of Motion " to offload Right hip.      Long Term Goals: 6-8 weeks   Pt will improve FOTO score to >/= 67 to demonstrate improved functional mobility.  Pt will be IND with final HEP to maintain/improve strength and mobility gained in PT.   Pt will report Right hip pain improved by >/= 100% with step ups and workout classes to demonstrate improved condition.   Pt will improve MMT of lower extremity strength deficits to >/= 4+/ 5 to improve tolerance for lifting/carrying activities. .   Pt goal:  Pt will report confidence in managing her condition upon discharge from PT.    PLAN     Plan of care Certification: 11/5/2024 to 01/05/2025.     Continue plan of care with focus on progressively loading Right hip as tolerated to return to PLOF.     Kevin Richardson, PT, DPT   Board Certified Clinical Specialist in Orthopedic Physical Therapy  Board Certified Clinical Specialist in Sports Physical Therapy

## 2024-11-26 ENCOUNTER — CLINICAL SUPPORT (OUTPATIENT)
Dept: REHABILITATION | Facility: HOSPITAL | Age: 59
End: 2024-11-26
Payer: COMMERCIAL

## 2024-11-26 DIAGNOSIS — R29.898 WEAKNESS OF RIGHT HIP: ICD-10-CM

## 2024-11-26 DIAGNOSIS — M53.86 DECREASED RANGE OF MOTION OF LUMBAR SPINE: Primary | ICD-10-CM

## 2024-11-26 PROCEDURE — 97112 NEUROMUSCULAR REEDUCATION: CPT | Mod: PN,CQ

## 2024-11-26 PROCEDURE — 97530 THERAPEUTIC ACTIVITIES: CPT | Mod: PN,CQ

## 2024-11-26 NOTE — PROGRESS NOTES
[facility]   Physical Therapy Treatment Note     Name: Daniela Lawson  Clinic Number: [id number]    Therapy Diagnosis:   Encounter Diagnoses   Name Primary?    Decreased range of motion of lumbar spine Yes    Weakness of right hip      Physician: Miguel Tracy MD    Visit Date: 11/26/2024    Physician Orders: PT Eval and Treat   Medical Diagnosis from Referral:   G57.01 (ICD-10-CM) - Piriformis syndrome, right   M25.551 (ICD-10-CM) - Right hip pain      Evaluation Date: 11/5/2024  Authorization Period Expiration: 12/31/2024  Plan of Care Expiration: 01/05/2025  Progress Note Due: 12/05/2024  Visit # / Visits authorized: 3/20  FOTO: 1/3     Precautions: Standard and past medical history listed in evaluation       PTA Visit #: 0/5     FOTO first follow up:   FOTO second follow up:     Time In: 1300  Time Out: 1342  Total Billable Time: 42 minutes    SUBJECTIVE     Pt reports: doing well, no issues upon arrival.  She was compliant with home exercise program.  Response to previous treatment: as above  Functional change: no pain, up stairs     Pain: 1/10  Location: right lateral hip       OBJECTIVE     Objective Measures updated at progress report unless specified.     Treatment     Daniela received the treatments listed below:      therapeutic exercises to develop strength, endurance, and ROM for 5 minutes including:    Upright bike x5mins level 3     manual therapy techniques: Joint mobilizations were applied to the: Right hip for 0 minutes, including:    Lateral distraction with mob belt grade III-IV   Long axis distraction grade III-IV     neuromuscular re-education activities to improve: glute activation for 10 minutes. The following activities were included:    Bridges 3x12 with Red Theraband 3s hold at top   Clamshells Red Theraband 3x12 each side   Single Leg balance 3x30s each side     therapeutic activities to improve functional performance for 27 minutes, including:    Shuttle leg  "press 100# 3x10   Shuttle single leg press 37# 3x10 each side   Prowler sled push [id number] there and back no weight   Step ups on 4" step 3x10 each side   KB deadlift 20# off of 8" step 3 x 10     Patient Education and Home Exercises     Home Exercises Provided and Patient Education Provided     Education provided:   - Continue Home Exercise Program     Written Home Exercises Provided: Patient instructed to cont prior HEP. Exercises were reviewed and Daniela was able to demonstrate them prior to the end of the session.  Daniela demonstrated good  understanding of the education provided. See EMR under Patient Instructions for exercises provided during therapy sessions    ASSESSMENT     Daniela tolerated treatment well. No adverse effects reported, overall doing very well with physical therapy and personal training at the gym. Will progress as tolerated.     Daniela Is progressing well towards her goals.   Pt prognosis is Good.     Pt will continue to benefit from skilled outpatient physical therapy to address the deficits listed in the problem list box on initial evaluation, provide pt/family education and to maximize pt's level of independence in the home and community environment.     Pt's spiritual, cultural and educational needs considered and pt agreeable to plan of care and goals.     Anticipated barriers to physical therapy: chronicity of condition     Goals:  Short Term Goals: 4 weeks   Pt will be IND with initial HEP to manage symptoms outside of PT.   Pt will report Right hip pain improved by >/= 50% with squating to demonstrate improved condition.   Pt will improve MMT of lower extremity strength deficits by >/= 1/5 to improve tolerance for progressing rehab.   Pt will demonstrate full pain free lumbar Range of Motion to offload Right hip.      Long Term Goals: 6-8 weeks   Pt will improve FOTO score to >/= 67 to demonstrate improved functional mobility.  Pt will be IND with final HEP to maintain/improve " strength and mobility gained in PT.   Pt will report Right hip pain improved by >/= 100% with step ups and workout classes to demonstrate improved condition.   Pt will improve MMT of lower extremity strength deficits to >/= 4+/ 5 to improve tolerance for lifting/carrying activities. .   Pt goal:  Pt will report confidence in managing her condition upon discharge from PT.    PLAN     Plan of care Certification: 11/5/2024 to 01/05/2025.     Continue plan of care with focus on progressively loading Right hip as tolerated to return to PLOF.     Ellen Drew, PTA,

## 2024-12-02 ENCOUNTER — TELEPHONE (OUTPATIENT)
Dept: FAMILY MEDICINE | Facility: CLINIC | Age: 59
End: 2024-12-02
Payer: COMMERCIAL

## 2024-12-02 DIAGNOSIS — R53.82 CHRONIC FATIGUE: ICD-10-CM

## 2024-12-02 DIAGNOSIS — E78.1 PURE HYPERTRIGLYCERIDEMIA: ICD-10-CM

## 2024-12-02 DIAGNOSIS — I49.8 PERIODIC HEART FLUTTER: ICD-10-CM

## 2024-12-02 DIAGNOSIS — Z00.00 ROUTINE HEALTH MAINTENANCE: ICD-10-CM

## 2024-12-02 DIAGNOSIS — R06.02 SOB (SHORTNESS OF BREATH): ICD-10-CM

## 2024-12-02 NOTE — TELEPHONE ENCOUNTER
----- Message from Jocelyn sent at 12/2/2024 10:50 AM CST -----  Pt needs her labs done. Send orders to [facility]. Pt #[phone]

## 2024-12-03 ENCOUNTER — CLINICAL SUPPORT (OUTPATIENT)
Dept: REHABILITATION | Facility: HOSPITAL | Age: 59
End: 2024-12-03
Payer: COMMERCIAL

## 2024-12-03 DIAGNOSIS — R29.898 WEAKNESS OF RIGHT HIP: ICD-10-CM

## 2024-12-03 DIAGNOSIS — M53.86 DECREASED RANGE OF MOTION OF LUMBAR SPINE: Primary | ICD-10-CM

## 2024-12-03 PROCEDURE — 97112 NEUROMUSCULAR REEDUCATION: CPT | Mod: PN

## 2024-12-03 PROCEDURE — 97530 THERAPEUTIC ACTIVITIES: CPT | Mod: PN

## 2024-12-03 PROCEDURE — 97110 THERAPEUTIC EXERCISES: CPT | Mod: PN

## 2024-12-03 NOTE — PROGRESS NOTES
[facility]   Physical Therapy Treatment Note     Name: Daniela Lawson  Clinic Number: [id number]    Therapy Diagnosis:   Encounter Diagnoses   Name Primary?    Decreased range of motion of lumbar spine Yes    Weakness of right hip      Physician: Miguel Tracy MD    Visit Date: 12/3/2024    Physician Orders: PT Eval and Treat   Medical Diagnosis from Referral:   G57.01 (ICD-10-CM) - Piriformis syndrome, right   M25.551 (ICD-10-CM) - Right hip pain      Evaluation Date: 11/5/2024  Authorization Period Expiration: 12/31/2024  Plan of Care Expiration: 01/05/2025  Progress Note Due: 12/05/2024  Visit # / Visits authorized: 3/20  FOTO: 1/3     Precautions: Standard and past medical history listed in evaluation       PTA Visit #: 0/5     FOTO first follow up:   FOTO second follow up:     Time In: 1301  Time Out: 13***  Total Billable Time: 42 minutes    SUBJECTIVE     Pt reports: feels like she is getting better and won't need PT for much longer. Doing her exercises at home     She was compliant with home exercise program.  Response to previous treatment: as above  Functional change: no pain, up stairs     Pain: 1/10  Location: right lateral hip       OBJECTIVE     Objective Measures updated at progress report unless specified.     Treatment     Daniela received the treatments listed below:      therapeutic exercises to develop strength, endurance, and ROM for 8 minutes including:    Upright bike x8mins level 4    manual therapy techniques: Joint mobilizations were applied to the: Right hip for 0 minutes, including:    Lateral distraction with mob belt grade III-IV   Long axis distraction grade III-IV     neuromuscular re-education activities to improve: glute activation for 10 minutes. The following activities were included:    Bridges 3x12 with GreenTheraband 3s hold at top   Clamshells Green Theraband 3x12 each side   Single Leg balance with contralateral hip flexion/extension 3x30s each  "side     therapeutic activities to improve functional performance for 27 minutes, including:    Donkey kicks 12# 3x10 each side   Shuttle leg press 100# 3x10   Shuttle single leg press 37# 3x10 each side   Prowler sled push [id number] there and back no weight   Step ups on 6" step 3x10 each side   KB deadlift 20# off of 8" step 3 x 10     Patient Education and Home Exercises     Home Exercises Provided and Patient Education Provided     Education provided:   - Continue Home Exercise Program     Written Home Exercises Provided: Patient instructed to cont prior HEP. Exercises were reviewed and Daniela was able to demonstrate them prior to the end of the session.  Daniela demonstrated good  understanding of the education provided. See EMR under Patient Instructions for exercises provided during therapy sessions    ASSESSMENT     Daniela presents to PT with reports of improved symptoms.     Daniela Is progressing well towards her goals.   Pt prognosis is Good.     Pt will continue to benefit from skilled outpatient physical therapy to address the deficits listed in the problem list box on initial evaluation, provide pt/family education and to maximize pt's level of independence in the home and community environment.     Pt's spiritual, cultural and educational needs considered and pt agreeable to plan of care and goals.     Anticipated barriers to physical therapy: chronicity of condition     Goals:  Short Term Goals: 4 weeks   Pt will be IND with initial HEP to manage symptoms outside of PT.   Pt will report Right hip pain improved by >/= 50% with squating to demonstrate improved condition.   Pt will improve MMT of lower extremity strength deficits by >/= 1/5 to improve tolerance for progressing rehab.   Pt will demonstrate full pain free lumbar Range of Motion to offload Right hip.      Long Term Goals: 6-8 weeks   Pt will improve FOTO score to >/= 67 to demonstrate improved functional mobility.  Pt will be IND with " final HEP to maintain/improve strength and mobility gained in PT.   Pt will report Right hip pain improved by >/= 100% with step ups and workout classes to demonstrate improved condition.   Pt will improve MMT of lower extremity strength deficits to >/= 4+/ 5 to improve tolerance for lifting/carrying activities. .   Pt goal:  Pt will report confidence in managing her condition upon discharge from PT.    PLAN     Plan of care Certification: 11/5/2024 to 01/05/2025.     Continue plan of care with focus on progressively loading Right hip as tolerated to return to PLOF.     Kevin Richardson, PT, DPT,

## 2024-12-03 NOTE — PROGRESS NOTES
[facility]   Physical Therapy Treatment Note     Name: Daniela Lawson  Clinic Number: [id number]    Therapy Diagnosis:   Encounter Diagnoses   Name Primary?    Decreased range of motion of lumbar spine Yes    Weakness of right hip      Physician: Miguel Tracy MD    Visit Date: 12/3/2024    Physician Orders: PT Eval and Treat   Medical Diagnosis from Referral:   G57.01 (ICD-10-CM) - Piriformis syndrome, right   M25.551 (ICD-10-CM) - Right hip pain      Evaluation Date: 11/5/2024  Authorization Period Expiration: 12/31/2024  Plan of Care Expiration: 01/05/2025  Progress Note Due: 12/05/2024  Visit # / Visits authorized: 4/20  FOTO: 1/3     Precautions: Standard and past medical history listed in evaluation       PTA Visit #: 0/5     FOTO first follow up:   FOTO second follow up:     Time In: 1301  Time Out: 1350  Total Billable Time: 49 minutes    SUBJECTIVE     Pt reports: feels like she is getting better and won't need PT for much longer. Doing her exercises at home     She was compliant with home exercise program.  Response to previous treatment: as above  Functional change: no pain, up stairs     Pain: 1/10  Location: right lateral hip       OBJECTIVE     Objective Measures updated at progress report unless specified.     Treatment     Daniela received the treatments listed below:      therapeutic exercises to develop strength, endurance, and ROM for 8 minutes including:    Upright bike x8mins level 4    manual therapy techniques: Joint mobilizations were applied to the: Right hip for 0 minutes, including:    Lateral distraction with mob belt grade III-IV   Long axis distraction grade III-IV     neuromuscular re-education activities to improve: glute activation for 10 minutes. The following activities were included:    Bridges 3x12 with GreenTheraband 3s hold at top   Clamshells Green Theraband 3x12 each side   Single Leg balance with contralateral hip flexion/extension 3x30s each side  "    therapeutic activities to improve functional performance for 31 minutes, including:    Donkey kicks 12# 3x10 each side   Shuttle leg press 100# 3x10   Shuttle single leg press 37# 3x10 each side   Prowler sled push [id number] there and back 50#   Step ups on 6" step 3x10 each side     Not today:   KB deadlift 20# off of 8" step 3 x 10     Patient Education and Home Exercises     Home Exercises Provided and Patient Education Provided     Education provided:   - Continue Home Exercise Program     Written Home Exercises Provided: Patient instructed to cont prior HEP. Exercises were reviewed and Daniela was able to demonstrate them prior to the end of the session.  Daniela demonstrated good  understanding of the education provided. See EMR under Patient Instructions for exercises provided during therapy sessions    ASSESSMENT     Daniela presents to PT with reports of improved symptoms. She tolerated treatment well able to increase resistance on sled push. Will formally re-assess next visit.     Daniela Is progressing well towards her goals.   Pt prognosis is Good.     Pt will continue to benefit from skilled outpatient physical therapy to address the deficits listed in the problem list box on initial evaluation, provide pt/family education and to maximize pt's level of independence in the home and community environment.     Pt's spiritual, cultural and educational needs considered and pt agreeable to plan of care and goals.     Anticipated barriers to physical therapy: chronicity of condition     Goals:  Short Term Goals: 4 weeks   Pt will be IND with initial HEP to manage symptoms outside of PT.   Pt will report Right hip pain improved by >/= 50% with squating to demonstrate improved condition.   Pt will improve MMT of lower extremity strength deficits by >/= 1/5 to improve tolerance for progressing rehab.   Pt will demonstrate full pain free lumbar Range of Motion to offload Right hip.      Long Term Goals: 6-8 " weeks   Pt will improve FOTO score to >/= 67 to demonstrate improved functional mobility.  Pt will be IND with final HEP to maintain/improve strength and mobility gained in PT.   Pt will report Right hip pain improved by >/= 100% with step ups and workout classes to demonstrate improved condition.   Pt will improve MMT of lower extremity strength deficits to >/= 4+/ 5 to improve tolerance for lifting/carrying activities. .   Pt goal:  Pt will report confidence in managing her condition upon discharge from PT.    PLAN     Plan of care Certification: 11/5/2024 to 01/05/2025.     Continue plan of care with focus on progressively loading Right hip as tolerated to return to PLOF.     Kevin Richardson, PT, DPT,

## 2024-12-04 ENCOUNTER — OFFICE VISIT (OUTPATIENT)
Dept: FAMILY MEDICINE | Facility: CLINIC | Age: 59
End: 2024-12-04
Payer: COMMERCIAL

## 2024-12-04 VITALS
OXYGEN SATURATION: 99 % | SYSTOLIC BLOOD PRESSURE: 124 MMHG | HEIGHT: 63 IN | BODY MASS INDEX: 29.82 KG/M2 | DIASTOLIC BLOOD PRESSURE: 76 MMHG | HEART RATE: 66 BPM | WEIGHT: 168.31 LBS

## 2024-12-04 DIAGNOSIS — I10 HYPERTENSION, UNSPECIFIED TYPE: ICD-10-CM

## 2024-12-04 DIAGNOSIS — Z00.00 ROUTINE HEALTH MAINTENANCE: Primary | ICD-10-CM

## 2024-12-04 DIAGNOSIS — E78.1 PURE HYPERTRIGLYCERIDEMIA: ICD-10-CM

## 2024-12-04 PROCEDURE — 1160F RVW MEDS BY RX/DR IN RCRD: CPT | Mod: CPTII,S$GLB,, | Performed by: NURSE PRACTITIONER

## 2024-12-04 PROCEDURE — 3078F DIAST BP <80 MM HG: CPT | Mod: CPTII,S$GLB,, | Performed by: NURSE PRACTITIONER

## 2024-12-04 PROCEDURE — 99396 PREV VISIT EST AGE 40-64: CPT | Mod: S$GLB,,, | Performed by: NURSE PRACTITIONER

## 2024-12-04 PROCEDURE — 99999 PR PBB SHADOW E&M-EST. PATIENT-LVL IV: CPT | Mod: PBBFAC,,, | Performed by: NURSE PRACTITIONER

## 2024-12-04 PROCEDURE — 3008F BODY MASS INDEX DOCD: CPT | Mod: CPTII,S$GLB,, | Performed by: NURSE PRACTITIONER

## 2024-12-04 PROCEDURE — 1159F MED LIST DOCD IN RCRD: CPT | Mod: CPTII,S$GLB,, | Performed by: NURSE PRACTITIONER

## 2024-12-04 PROCEDURE — 3074F SYST BP LT 130 MM HG: CPT | Mod: CPTII,S$GLB,, | Performed by: NURSE PRACTITIONER

## 2024-12-04 RX ORDER — NEBIVOLOL 5 MG/1
5 TABLET ORAL DAILY
Qty: 90 TABLET | Refills: 1 | Status: SHIPPED | OUTPATIENT
Start: 2024-12-04 | End: 2024-12-04 | Stop reason: SDUPTHER

## 2024-12-04 RX ORDER — TRIAMTERENE/HYDROCHLOROTHIAZID 37.5-25 MG
1 TABLET ORAL DAILY
Qty: 90 TABLET | Refills: 1 | Status: SHIPPED | OUTPATIENT
Start: 2024-12-04

## 2024-12-04 RX ORDER — ICOSAPENT ETHYL 1 G/1
CAPSULE ORAL
Qty: 360 CAPSULE | Refills: 1 | Status: SHIPPED | OUTPATIENT
Start: 2024-12-04 | End: 2024-12-04 | Stop reason: SDUPTHER

## 2024-12-04 RX ORDER — NEBIVOLOL 5 MG/1
5 TABLET ORAL DAILY
Qty: 90 TABLET | Refills: 1 | Status: SHIPPED | OUTPATIENT
Start: 2024-12-04

## 2024-12-04 RX ORDER — AMLODIPINE BESYLATE 2.5 MG/1
2.5 TABLET ORAL DAILY
Qty: 90 TABLET | Refills: 1 | Status: SHIPPED | OUTPATIENT
Start: 2024-12-04 | End: 2024-12-04 | Stop reason: SDUPTHER

## 2024-12-04 RX ORDER — TRIAMTERENE/HYDROCHLOROTHIAZID 37.5-25 MG
1 TABLET ORAL DAILY
Qty: 90 TABLET | Refills: 1 | Status: SHIPPED | OUTPATIENT
Start: 2024-12-04 | End: 2024-12-04 | Stop reason: SDUPTHER

## 2024-12-04 RX ORDER — AMLODIPINE BESYLATE 2.5 MG/1
2.5 TABLET ORAL DAILY
Qty: 90 TABLET | Refills: 1 | Status: SHIPPED | OUTPATIENT
Start: 2024-12-04

## 2024-12-04 RX ORDER — ICOSAPENT ETHYL 1 G/1
CAPSULE ORAL
Qty: 360 CAPSULE | Refills: 1 | Status: SHIPPED | OUTPATIENT
Start: 2024-12-04

## 2024-12-05 ENCOUNTER — LAB VISIT (OUTPATIENT)
Dept: LAB | Facility: HOSPITAL | Age: 59
End: 2024-12-05
Attending: NURSE PRACTITIONER
Payer: COMMERCIAL

## 2024-12-05 DIAGNOSIS — R53.82 CHRONIC FATIGUE: ICD-10-CM

## 2024-12-05 DIAGNOSIS — Z00.00 ROUTINE HEALTH MAINTENANCE: ICD-10-CM

## 2024-12-05 DIAGNOSIS — R06.02 SOB (SHORTNESS OF BREATH): ICD-10-CM

## 2024-12-05 DIAGNOSIS — E78.1 PURE HYPERTRIGLYCERIDEMIA: ICD-10-CM

## 2024-12-05 DIAGNOSIS — I49.8 PERIODIC HEART FLUTTER: ICD-10-CM

## 2024-12-05 LAB
ALBUMIN SERPL BCP-MCNC: 4.1 G/DL (ref 3.5–5.2)
ALP SERPL-CCNC: 58 U/L (ref 40–150)
ALT SERPL W/O P-5'-P-CCNC: 25 U/L (ref 10–44)
ANION GAP SERPL CALC-SCNC: 11 MMOL/L (ref 8–16)
AST SERPL-CCNC: 43 U/L (ref 10–40)
BASOPHILS # BLD AUTO: 0.02 K/UL (ref 0–0.2)
BASOPHILS NFR BLD: 0.5 % (ref 0–1.9)
BILIRUB SERPL-MCNC: 0.6 MG/DL (ref 0.1–1)
BUN SERPL-MCNC: 23 MG/DL (ref 8–23)
CALCIUM SERPL-MCNC: 9.4 MG/DL (ref 8.7–10.5)
CHLORIDE SERPL-SCNC: 104 MMOL/L (ref 95–110)
CHOLEST SERPL-MCNC: 176 MG/DL (ref 120–199)
CHOLEST/HDLC SERPL: 4.6 {RATIO} (ref 2–5)
CO2 SERPL-SCNC: 25 MMOL/L (ref 23–29)
CREAT SERPL-MCNC: 0.9 MG/DL (ref 0.5–1.4)
DIFFERENTIAL METHOD BLD: ABNORMAL
EOSINOPHIL # BLD AUTO: 0.1 K/UL (ref 0–0.5)
EOSINOPHIL NFR BLD: 2.4 % (ref 0–8)
ERYTHROCYTE [DISTWIDTH] IN BLOOD BY AUTOMATED COUNT: 11.7 % (ref 11.5–14.5)
EST. GFR  (NO RACE VARIABLE): >60 ML/MIN/1.73 M^2
ESTIMATED AVG GLUCOSE: 97 MG/DL (ref 68–131)
FERRITIN SERPL-MCNC: 166 NG/ML (ref 20–300)
FOLATE SERPL-MCNC: 17 NG/ML (ref 4–24)
GLUCOSE SERPL-MCNC: 98 MG/DL (ref 70–110)
HBA1C MFR BLD: 5 % (ref 4.5–6.2)
HCT VFR BLD AUTO: 39.9 % (ref 37–48.5)
HDLC SERPL-MCNC: 38 MG/DL (ref 40–75)
HDLC SERPL: 21.6 % (ref 20–50)
HGB BLD-MCNC: 13.6 G/DL (ref 12–16)
IMM GRANULOCYTES # BLD AUTO: 0.01 K/UL (ref 0–0.04)
IMM GRANULOCYTES NFR BLD AUTO: 0.2 % (ref 0–0.5)
IRON SERPL-MCNC: 77 UG/DL (ref 30–160)
LDLC SERPL CALC-MCNC: 100.4 MG/DL (ref 63–159)
LYMPHOCYTES # BLD AUTO: 1.7 K/UL (ref 1–4.8)
LYMPHOCYTES NFR BLD: 40.8 % (ref 18–48)
MCH RBC QN AUTO: 32.1 PG (ref 27–31)
MCHC RBC AUTO-ENTMCNC: 34.1 G/DL (ref 32–36)
MCV RBC AUTO: 94 FL (ref 82–98)
MONOCYTES # BLD AUTO: 0.3 K/UL (ref 0.3–1)
MONOCYTES NFR BLD: 6.6 % (ref 4–15)
NEUTROPHILS # BLD AUTO: 2.1 K/UL (ref 1.8–7.7)
NEUTROPHILS NFR BLD: 49.5 % (ref 38–73)
NONHDLC SERPL-MCNC: 138 MG/DL
NRBC BLD-RTO: 0 /100 WBC
PLATELET # BLD AUTO: 205 K/UL (ref 150–450)
PMV BLD AUTO: 8.8 FL (ref 9.2–12.9)
POTASSIUM SERPL-SCNC: 3.9 MMOL/L (ref 3.5–5.1)
PROT SERPL-MCNC: 7 G/DL (ref 6–8.4)
RBC # BLD AUTO: 4.24 M/UL (ref 4–5.4)
SODIUM SERPL-SCNC: 140 MMOL/L (ref 136–145)
T4 FREE SERPL-MCNC: 1.06 NG/DL (ref 0.71–1.51)
TRIGL SERPL-MCNC: 188 MG/DL (ref 30–150)
TSH SERPL DL<=0.005 MIU/L-ACNC: 1.84 UIU/ML (ref 0.4–4)
VIT B12 SERPL-MCNC: 1374 PG/ML (ref 210–950)
WBC # BLD AUTO: 4.22 K/UL (ref 3.9–12.7)

## 2024-12-05 PROCEDURE — 82607 VITAMIN B-12: CPT | Performed by: NURSE PRACTITIONER

## 2024-12-05 PROCEDURE — 83540 ASSAY OF IRON: CPT | Performed by: NURSE PRACTITIONER

## 2024-12-05 PROCEDURE — 36415 COLL VENOUS BLD VENIPUNCTURE: CPT | Performed by: NURSE PRACTITIONER

## 2024-12-05 PROCEDURE — 80053 COMPREHEN METABOLIC PANEL: CPT | Performed by: NURSE PRACTITIONER

## 2024-12-05 PROCEDURE — 82728 ASSAY OF FERRITIN: CPT | Performed by: NURSE PRACTITIONER

## 2024-12-05 PROCEDURE — 80061 LIPID PANEL: CPT | Performed by: NURSE PRACTITIONER

## 2024-12-05 PROCEDURE — 86376 MICROSOMAL ANTIBODY EACH: CPT | Performed by: NURSE PRACTITIONER

## 2024-12-05 PROCEDURE — 84443 ASSAY THYROID STIM HORMONE: CPT | Performed by: NURSE PRACTITIONER

## 2024-12-05 PROCEDURE — 82652 VIT D 1 25-DIHYDROXY: CPT | Performed by: NURSE PRACTITIONER

## 2024-12-05 PROCEDURE — 83036 HEMOGLOBIN GLYCOSYLATED A1C: CPT | Performed by: NURSE PRACTITIONER

## 2024-12-05 PROCEDURE — 82746 ASSAY OF FOLIC ACID SERUM: CPT | Performed by: NURSE PRACTITIONER

## 2024-12-05 PROCEDURE — 84439 ASSAY OF FREE THYROXINE: CPT | Performed by: NURSE PRACTITIONER

## 2024-12-05 PROCEDURE — 85025 COMPLETE CBC W/AUTO DIFF WBC: CPT | Performed by: NURSE PRACTITIONER

## 2024-12-05 NOTE — PROGRESS NOTES
SUBJECTIVE:      Patient ID: Daniela Lawson is a 59 y.o. female.    Chief Complaint: Annual Exam (Annual/Well Exam)      Presents for well exam    Discussed outstanding health maintenance        Past Surgical History:   Procedure Laterality Date     SECTION  1990    CHOLECYSTECTOMY      HYSTERECTOMY      INSERTION OF URETERAL CATHETER N/A 10/16/2019    Procedure: INSERTION, CATHETER, URETER;  Surgeon: Willard Ruffin MD;  Location: [facility] OR;  Service: Urology;  Laterality: N/A;    LAPAROSCOPIC SIGMOIDECTOMY N/A 10/16/2019    Procedure: COLECTOMY, SIGMOID, LAPAROSCOPIC;  Surgeon: Doug Gifford MD;  Location: [facility] OR;  Service: General;  Laterality: N/A;    ROBOT-ASSISTED LAPAROSCOPIC REPAIR OF INCISIONAL HERNIA N/A 2023    Procedure: ROBOTIC REPAIR, HERNIA, INCISIONAL;  Surgeon: Nj Penaloza MD;  Location[facility];  Service: General;  Laterality: N/A;    SHOULDER ARTHROSCOPY Bilateral     STOMACH SURGERY      Possible Nissen per pt- To treat acid reflux     TUBAL LIGATION  1990     Family History   Problem Relation Name Age of Onset    Coronary artery disease Mother      Hyperlipidemia Mother      Other Mother          Breast Nodules    Arthritis Mother      Hypertension Father      Atrial fibrillation Father      Neuropathy Father      Other Father          Bypass    Diabetes Father      Hypertension Sister      Cancer Sister          Breast [place]    Neuropathy Sister      Breast cancer Sister      Hypertension Daughter      No Known Problems Son      Diabetes Maternal Grandmother      Hypertension Maternal Grandmother      No Known Problems Maternal Grandfather      Coronary artery disease Paternal Grandmother      Cancer Paternal Grandmother          Breast Cancer    Heart attack Paternal Grandmother      Breast cancer Paternal Grandmother      Dementia Paternal Grandfather      Heart attack Paternal Grandfather      No Known Problems Sister      Other Daughter           Benign Hypertension(Brain)    Breast cancer Maternal Aunt      Breast cancer Paternal Aunt        Social History     Socioeconomic History    Marital status:     Number of children: 3   Tobacco Use    Smoking status: Never     Passive exposure: Never    Smokeless tobacco: Never   Substance and Sexual Activity    Alcohol use: Yes     Alcohol/week: 3.0 standard drinks of alcohol     Types: 3 Glasses of wine per week     Comment: 3 glasses of wine per week     Drug use: Never     Social Drivers of Health     Financial Resource Strain: Low Risk  (11/27/2024)    Overall Financial Resource Strain (CARDIA)     Difficulty of Paying Living Expenses: Not hard at all   Food Insecurity: No Food Insecurity (11/27/2024)    Hunger Vital Sign     Worried About Running Out of Food in the Last Year: Never true     Ran Out of Food in the Last Year: Never true   Transportation Needs: No Transportation Needs (11/13/2023)    PRAPARE - Transportation     Lack of Transportation (Medical): No     Lack of Transportation (Non-Medical): No   Physical Activity: Sufficiently Active (11/27/2024)    Exercise Vital Sign     Days of Exercise per Week: 4 days     Minutes of Exercise per Session: 60 min   Stress: Stress Concern Present (11/27/2024)    [employer] - Occupational Stress Questionnaire     Feeling of Stress : To some extent   Housing Stability: Low Risk  (11/13/2023)    Housing Stability Vital Sign     Unable to Pay for Housing in the Last Year: No     Number of Places Lived in the Last Year: 1     Unstable Housing in the Last Year: No     Current Outpatient Medications   Medication Sig Dispense Refill    aflibercept (EYLEA) 2 mg/0.05 mL Soln 2 mg by Intravitreal route.      aspirin (ECOTRIN) 81 MG EC tablet Take 81 mg by mouth once daily.      cetirizine 10 mg chewable tablet Take 10 mg by mouth once daily.      cholecalciferol, vitamin D3, 125 mcg (5,000 unit) Tab 5,000 Units once daily.        cyanocobalamin, vitamin B-12, 5,000 mcg TbDL Take 5,000 Units by mouth once daily.       ketorolac 0.4% (ACULAR) 0.4 % Drop Place 1 drop into both eyes 4 (four) times daily.      magnesium oxide 500 mg Cap Take 1 capsule by mouth once daily.      methocarbamoL (ROBAXIN) 500 MG Tab Take 1 tablet (500 mg total) by mouth 3 (three) times daily as needed (hip tightness). 30 tablet 2    multivitamin capsule Take 1 capsule by mouth once daily.      nystatin-triamcinolone (MYCOLOG II) cream Apply topically 2 (two) times daily. 60 g 2    progesterone (PROMETRIUM) 200 MG capsule Take 200 mg by mouth every evening.      salmeteroL (SEREVENT) 50 mcg/dose diskus inhaler Inhale 1 puff into the lungs 2 (two) times daily. Controller 3 each 3    turmeric root extract 500 mg Cap Take 500 mg by mouth once daily.       albuterol (PROVENTIL/VENTOLIN HFA) 90 mcg/actuation inhaler Inhale 1-2 puffs into the lungs every 4 to 6 hours as needed for Wheezing or Shortness of Breath. Rescue 18 g 3    amLODIPine (NORVASC) 2.5 MG tablet Take 1 tablet (2.5 mg total) by mouth once daily. 90 tablet 1    icosapent ethyL (VASCEPA) 1 gram Cap TAKE 2 CAPSULES TWICE A  capsule 1    nebivoloL (BYSTOLIC) 5 MG Tab Take 1 tablet (5 mg total) by mouth once daily. 90 tablet 1    triamterene-hydrochlorothiazide 37.5-25 mg (MAXZIDE-25) 37.5-25 mg per tablet Take 1 tablet by mouth once daily. 90 tablet 1     No current facility-administered medications for this visit.     Review of patient's allergies indicates:   Allergen Reactions    Meperidine Other (See Comments)     MAKES HER VERY EVIL    Oxycodone-acetaminophen Itching    Lisinopril Other (See Comments)     Chest pain    Meloxicam Other (See Comments)     LEGS SWELL    Adhesive Hives      Past Medical History:   Diagnosis Date    Anesthesia 2009    Pt reports severe drop in BP when anesthesia given.    Arthritis     Asthma     Diverticulitis     GERD (gastroesophageal reflux disease)     Hypertension      Neuropathy 2007    PONV (postoperative nausea and vomiting)     Stroke     RETINACULAR     Wears glasses      Past Surgical History:   Procedure Laterality Date     SECTION  1990    CHOLECYSTECTOMY      HYSTERECTOMY      INSERTION OF URETERAL CATHETER N/A 10/16/2019    Procedure: INSERTION, CATHETER, URETER;  Surgeon: Willard Ruffin MD;  Location: [facility];  Service: Urology;  Laterality: N/A;    LAPAROSCOPIC SIGMOIDECTOMY N/A 10/16/2019    Procedure: COLECTOMY, SIGMOID, LAPAROSCOPIC;  Surgeon: Doug Gifford MD;  Location: [facility];  Service: General;  Laterality: N/A;    ROBOT-ASSISTED LAPAROSCOPIC REPAIR OF INCISIONAL HERNIA N/A 2023    Procedure: ROBOTIC REPAIR, HERNIA, INCISIONAL;  Surgeon: Nj Penaloza MD;  Location: [facility];  Service: General;  Laterality: N/A;    SHOULDER ARTHROSCOPY Bilateral     STOMACH SURGERY      Possible Nissen per pt- To treat acid reflux     TUBAL LIGATION  1990       Review of Systems   Constitutional:  Negative for activity change, appetite change, fatigue and unexpected weight change.   HENT:  Negative for congestion, ear pain, hearing loss, postnasal drip, rhinorrhea, sinus pressure, sinus pain, sneezing, sore throat and trouble swallowing.    Eyes:  Negative for photophobia, pain, discharge and visual disturbance.   Respiratory:  Negative for cough, chest tightness, shortness of breath and wheezing.    Cardiovascular:  Negative for chest pain, palpitations and leg swelling.        Follows with cardiology   Gastrointestinal:  Negative for abdominal distention, abdominal pain, blood in stool, constipation, diarrhea, nausea and vomiting.   Endocrine: Negative for cold intolerance, heat intolerance, polydipsia and polyuria.   Genitourinary:  Negative for difficulty urinating, dysuria, flank pain, frequency, hematuria, menstrual problem, pelvic pain and urgency.   Musculoskeletal:  Positive for arthralgias. Negative for back pain, joint  "swelling, myalgias and neck pain.   Skin:  Negative for pallor.   Allergic/Immunologic: Negative for environmental allergies and food allergies.   Neurological:  Negative for dizziness, weakness, light-headedness, numbness and headaches.   Hematological:  Does not bruise/bleed easily.   Psychiatric/Behavioral:  Negative for agitation, confusion, decreased concentration, dysphoric mood and sleep disturbance. The patient is not nervous/anxious.       OBJECTIVE:      Vitals:    12/04/24 1635   BP: 124/76   BP Location: Right arm   Patient Position: Sitting   Pulse: 66   SpO2: 99%   Weight: 76.3 kg (168 lb 4.8 oz)   Height: 5' 3" (1.6 m)     Physical Exam  Vitals and nursing note reviewed.   Constitutional:       General: She is not in acute distress.     Appearance: Normal appearance. She is well-developed, well-groomed and overweight.   HENT:      Head: Normocephalic and atraumatic.      Right Ear: Hearing normal.      Left Ear: Hearing normal.      Nose: Nose normal. No rhinorrhea.   Eyes:      General: Lids are normal.         Right eye: No discharge.         Left eye: No discharge.      Conjunctiva/sclera: Conjunctivae normal.      Right eye: Right conjunctiva is not injected.      Left eye: Left conjunctiva is not injected.      Pupils: Pupils are equal, round, and reactive to light. Pupils are equal.      Right eye: Pupil is round and reactive.      Left eye: Pupil is round and reactive.   Neck:      Thyroid: No thyromegaly.      Vascular: No JVD.      Trachea: Trachea normal. No tracheal deviation.   Cardiovascular:      Rate and Rhythm: Normal rate and regular rhythm.      Pulses:           Radial pulses are 2+ on the right side and 2+ on the left side.      Heart sounds: Normal heart sounds. No murmur heard.     No friction rub. No gallop.   Pulmonary:      Effort: Pulmonary effort is normal. No respiratory distress.      Breath sounds: Normal breath sounds. No stridor. No decreased breath sounds, wheezing, " rhonchi or rales.   Abdominal:      General: Bowel sounds are normal. There is no distension.      Palpations: Abdomen is soft. Abdomen is not rigid.      Tenderness: There is no abdominal tenderness. There is no guarding.   Musculoskeletal:         General: Normal range of motion.      Cervical back: Normal range of motion and neck supple.   Lymphadenopathy:      Cervical: No cervical adenopathy.   Skin:     General: Skin is warm and dry.      Capillary Refill: Capillary refill takes less than 2 seconds.      Coloration: Skin is not pale.      Findings: No lesion or rash.   Neurological:      Mental Status: She is alert and oriented to person, place, and time.      GCS: GCS eye subscore is 4. GCS verbal subscore is 5. GCS motor subscore is 6.      Cranial Nerves: Cranial nerves 2-12 are intact.      Sensory: Sensation is intact.      Motor: Motor function is intact. No atrophy.      Coordination: Coordination is intact. Coordination normal.      Gait: Gait is intact. Gait normal.   Psychiatric:         Attention and Perception: Attention and perception normal. She is attentive.         Mood and Affect: Mood and affect normal.         Speech: Speech normal.         Behavior: Behavior normal.         Thought Content: Thought content normal.         Cognition and Memory: Cognition and memory normal.         Judgment: Judgment normal.        Assessment:       1. Routine health maintenance        Plan:       Routine health maintenance  - will call with results of labs ordered from earlier this week  - appears stable on current med regimen  - chronic meds refilled in separate encounter  - continue to follow with appropriate specialists PRN  - Limit: red meat, butter, fried foods, cheese, and other foods that have a lot of saturated fat. Consume: lean meats, fish, fruits, vegetables, whole grains, beans, lentils, and nuts. Continued weight loss and adequate daily hydration.  - Patient counseled on age appropriate medical  preventive services, age appropriate cancer screenings, nutrition, healthy diet, consistent exercise regimen and maintaining an active lifestyle.  - Instructed on guidelines recommending 150 minutes per week of brisk exercise and healthy lifestyle. Recommended well screenings (including immunizations) reviewed with patient. Discussed outstanding health maintenance and rationale for completion.                    Follow up in about 1 year (around 12/4/2025) for well exam.      12/4/2024 CHITO Shea, FNP-C

## 2024-12-06 LAB — THYROPEROXIDASE AB SERPL-ACNC: <0.3 IU/ML

## 2024-12-09 LAB — 1,25(OH)2D3 SERPL-MCNC: 49 PG/ML (ref 20–79)

## 2024-12-10 ENCOUNTER — CLINICAL SUPPORT (OUTPATIENT)
Dept: REHABILITATION | Facility: HOSPITAL | Age: 59
End: 2024-12-10
Payer: COMMERCIAL

## 2024-12-10 DIAGNOSIS — N81.11 CYSTOCELE, MIDLINE: ICD-10-CM

## 2024-12-10 DIAGNOSIS — M62.89 PELVIC FLOOR DYSFUNCTION: Primary | ICD-10-CM

## 2024-12-10 DIAGNOSIS — N39.46 URINARY INCONTINENCE, MIXED: ICD-10-CM

## 2024-12-10 DIAGNOSIS — R29.898 WEAKNESS OF RIGHT HIP: ICD-10-CM

## 2024-12-10 DIAGNOSIS — M53.86 DECREASED RANGE OF MOTION OF LUMBAR SPINE: Primary | ICD-10-CM

## 2024-12-10 DIAGNOSIS — R35.0 URINARY FREQUENCY: ICD-10-CM

## 2024-12-10 PROCEDURE — 97530 THERAPEUTIC ACTIVITIES: CPT | Mod: PN

## 2024-12-10 PROCEDURE — 97110 THERAPEUTIC EXERCISES: CPT | Mod: PN

## 2024-12-10 PROCEDURE — 97530 THERAPEUTIC ACTIVITIES: CPT | Mod: PO

## 2024-12-10 PROCEDURE — 97112 NEUROMUSCULAR REEDUCATION: CPT | Mod: PN

## 2024-12-10 PROCEDURE — 97162 PT EVAL MOD COMPLEX 30 MIN: CPT | Mod: PO

## 2024-12-10 NOTE — PROGRESS NOTES
"[facility]  Pelvic Health Physical Therapy Initial Evaluation    Date: 12/10/2024   Name: Daniela Lawson  Clinic Number: [id number]  Therapy Diagnosis:   Encounter Diagnoses   Name Primary?    Urinary incontinence, mixed     Urinary frequency     Cystocele, midline      Physician: YOUSIF Cisneros,*    Physician Orders: PT Eval and Treat   Medical Diagnosis from Referral: Urinary incontinence, mixed [N39.46], Urinary frequency [R35.0], Cystocele, midline [N81.11]   Evaluation Date: 12/10/2024  Plan of Care Expiration: 3/10/2025  Visit # / Visits authorized:     FOTO 1 /3 on 12/10/2024      Time In: 230  Time Out: 315  Total Appointment Time (timed & untimed codes): 45 minutes    Precautions: universal     Subjective     Date of onset: chronic.     History of current condition - Daniela reports: urinary frequency is better with pessary placement, but she has stress urinary incontinence with cough or sneeze - usually she will either go to the bathroom or try to clench her legs; if she gets caught off guard then she'll "be in trouble". She has stress urinary incontinence before the pessary but it wasn't as often (more often when she got sick and was coughing a lot).   She does urinate 2x during exercise class and holds off on her fluids to avoid more urinary incontinence with activity.     She did accidentally push her pessary out last night because she was a little constipated and was impatient and pushed too hard - was able to clean it really well, left it out overnight to dry, and got it back in place on her own.     She has noticed that she does sometimes has about a Tbsp of post-void leakage when she goes to get off the toilet - that started before the pessary but is happening more often now.     OB/GYN History: , vaginal delivery, episiotomy, perineal laceration, and caesarean    Pain:  None     Bladder/Bowel History: has always had bowel issues - they had to remove 12 in of her " colon due to diverticulitis in 2019, took a few years for her gut to adjust.    Frequency of urination:   Daytime: can go 2 hrs between voids as long as she's not drinking too much            Nighttime: once/night;   Difficulty initiating urine stream: not usually   Urine stream: strong  Complete emptying: yes, since pessary placement   Bladder leakage: Yes   Frequency of incidents: at least 5/7 days   Amount leaked (urine): teaspoon(s) and small squirt   Urinary Urgency: Yes, at times, such as if she wakes up at 4 AM instead of 2 AM to void, or if she's in the car longer than 2 hrs on a road trip.   Frequency of bowel movements: most every day she has one, sometimes 2. Sometimes they're both normal, other times she can have a bowel movement but then later in the day she'll be constipated. Occasionally has loose stools.   Difficulty initiating BM: Yes; yesterday pushed her pessary out because of constipation.   Quality/Shape of BM: varies   Does Patient Feel Empty after BM? Most of the time, but not all the time   Fiber Supplements or Laxative Use? Tries to remember Metamucil (a few tsp/day) - estimates she remembers it about 4 days/week. Sometimes if she takes it too many days in a row she'll have loose stools.   Colon leakage: No  Bowel Urgency - yes, occasionally (better in the last couple years).   Form of protection: she's tried pads with exercise class but they don't stick to her underwear because of sweat, ordered some ever-dry underwear to try out. Doesn't usually wear protection otherwise unless she has a cold.        Medical History: Daniela  has a past medical history of Anesthesia (), Arthritis, Asthma, Diverticulitis, GERD (gastroesophageal reflux disease), Hypertension, Neuropathy (), PONV (postoperative nausea and vomiting), Stroke, and Wears glasses.     Surgical History: Daniela Lawson  has a past surgical history that includes  section (1990); Hysterectomy (); Tubal  ligation (07/27/1990); Shoulder arthroscopy (Bilateral); Cholecystectomy; Stomach surgery; Laparoscopic sigmoidectomy (N/A, 10/16/2019); Insertion of ureteral catheter (N/A, 10/16/2019); and Robot-assisted laparoscopic repair of incisional hernia (N/A, 11/16/2023).    Medications: Daniela has a current medication list which includes the following prescription(s): eylea, albuterol, amlodipine, aspirin, cetirizine, cholecalciferol (vitamin d3), cyanocobalamin (vitamin b-12), icosapent ethyl, ketorolac 0.4%, magnesium oxide, methocarbamol, multivitamin, nebivolol, nystatin-triamcinolone, progesterone, salmeterol 50 mcg/dose, triamterene-hydrochlorothiazide 37.5-25 mg, and turmeric root extract.    Allergies:   Review of patient's allergies indicates:   Allergen Reactions    Meperidine Other (See Comments)     MAKES HER VERY EVIL    Oxycodone-acetaminophen Itching    Lisinopril Other (See Comments)     Chest pain    Meloxicam Other (See Comments)     LEGS SWELL    Adhesive Hives          Prior Therapy/Previous treatment included: none   Current exercise: exercise class 2-3 days/week, the other 2-3 days will do weights and some cardio with a friend.   Occupation: retired   Prior Level of Function: independent   Current Level of Function: see above     Types of fluid intake: coffee in the AM; water: could drink up to a gallon/day     Pts goals: resolve urinary incontinence     OBJECTIVE       VAGINAL PELVIC FLOOR EXAM - next session due to time      Limitation/Restriction for FOTO Pelvic Survey    Therapist reviewed FOTO scores for Daniela Lawson on 12/10/2024.   FOTO documents entered into [employer] - see Media section.       TREATMENT     Total Treatment time (time-based codes) separate from Evaluation: 30 minutes      Therapeutic Activity Patient participated in dynamic functional therapeutic activities to improve functional performance for 30 minutes. Including: Education as described below:     Patient Education  provided:   general anatomy/physiology of urinary/ bowel  system and benefits of treatment were discussed with the pt. Additionally, anatomy/physiology of pelvic floor, double voiding techniques, proper bearing down techniques, and fluid intake/dietary modifications were reviewed.     Home Exercises provided:  Written Home Exercises provided: yes.  Exercises were reviewed and Daniela was able to demonstrate them prior to the end of the session.    Daniela demonstrated good  understanding of the education provided.     See EMR under Patient Instructions for exercises provided 12/10/2024.    Assessment     Daniela is a 59 y.o. female referred to outpatient Physical Therapy with a medical diagnosis of Urinary incontinence, mixed [N39.46], Urinary frequency [R35.0], Cystocele, midline [N81.11] . Pt presents with poor knowledge of body mechanics and posture, decreased pelvic muscle strength, decreased endurance of the pelvic muscles, decreased phasic ability of the pelvic muscles, poor quality of pelvic muscle contraction, poor coordination of pelvic floor muscles during ADL's leading to urinary or fecal leakage, dysfunctional voiding, dysfunctional defecation, and unable to co-contract or co-relax abdominal wall and pelvic floor muscles.      Pt prognosis is Good.   Pt will benefit from skilled outpatient Physical Therapy to address the deficits stated above and in the chart below, provide pt/family education, and to maximize pt's level of independence.     Plan of care discussed with patient: Yes  Pt's spiritual, cultural and educational needs considered and patient is agreeable to the plan of care and goals as stated below:     Anticipated Barriers for therapy: none     Medical Necessity is demonstrated by the following  History  Co-morbidities and personal factors that may impact the plan of care [] LOW: no personal factors / co-morbidities  [x] MODERATE: 1-2 personal factors / co-morbidities  [] HIGH: 3+ personal  factors / co-morbidities    Moderate / High Support Documentation:   Co-morbidities affecting plan of care:  has a past medical history of Anesthesia (2009), Arthritis, Asthma, Diverticulitis, GERD (gastroesophageal reflux disease), Hypertension, Neuropathy (2007), PONV (postoperative nausea and vomiting), Stroke, and Wears glasses.     Personal Factors:   no deficits     Examination  Body Structures and Functions, activity limitations and participation restrictions that may impact the plan of care [] LOW: addressing 1-2 elements  [x] MODERATE: 3+ elements  [] HIGH: 4+ elements (please support below)    Moderate / High Support Documentation: see evaluation     Clinical Presentation [] LOW: stable  [x] MODERATE: Evolving  [] HIGH: Unstable     Decision Making/ Complexity Score: moderate         Goals:  Short Term Goals: 6 weeks   - Pt will demonstrate excellent knowledge and adherence to HEP to facilitate optimal recovery.  - Pt will demonstrate proper PFM contraction, relaxation, and lengthening coordinated with TA and breath for improved muscle coordination needed for functional activity.    Long Term Goals: 12 weeks   - Pt will demonstrate excellent knowledge and adherence to HEP for continued self-maintenance of symptoms.  - Pt will report FOTO score of 10% improvement or more indicating clinically relevant increase in function.  - Pt will report voiding interval of 2-3 hours for improved ADL tolerance.  - Pt will report ability to delay urinary urge for at least 15 minutes to maintain continence with ADL/IADLs.   - Pt will report little (drops) to no incidence of urinary incontinence 7/7 days for improved hygiene and ADL/IADL tolerance.   - Pt will demonstrate PFM strength of at least 3/5 MMT for improved strength needed to maintain continence.   - Pt will report bearing down appropriately 100% of the time for improved bowel function and decreased stress on adjacent pelvic structures.   - Pt will demonstrate  independence with pressure-management strategies to decreased stress on adjacent pelvic structures.     Plan     Plan of care Certification: 12/10/2024 to 3/10/2025.    Outpatient Physical Therapy 1 times weekly for 12 weeks to include the following interventions: therapeutic exercises, therapeutic activity, neuromuscular re-education, manual therapy, modalities PRN, patient/family education, dry needling, and self care/home management    Vikki Melchor, PT, DPT, WCS

## 2024-12-10 NOTE — PATIENT INSTRUCTIONS
"     Bowel Movement Mechanics  1. Sit on the toilet comfortably with legs and buttocks relaxed.  2. Put your feet on a waste basket or squatty potty  3. Lean forward while keeping your back straight, forearms rest on knees.  4. Keep your knees apart.  5. Fully relax pelvic floor muscles - think about softening, opening, dropping  6. Use gentle belly breaths and bulge lower abdominals like making a beer belly  7. Keep belly big on exhalation  8. Exhale like blowing out birthday candles  9. Keep breathing like this through entire bowel movement  10. Make sure to give enough time, ok for it to take several minutes     Do not strain and Do not hold your breath.       (Search "Squatty Potty" on [employer])       __________________________________________________      DOUBLE VOIDING - Double voiding is a technique that may assist the bladder to empty more effectively when  urine is left in the bladder. It involves passing  urine more than once each time that you go to  the toilet. This makes sure that the bladder is  completely empty.    Here are 3 strategies you can try to fully empty your bladder.  You do not have to do all of these things every single time. Find which ones work best for you.     Check to make sure your pelvic floor is relaxed   Do a body scan - make sure your legs, buttocks, and abdominals are relaxed.  Take a couple deep, slow breaths to encourage your pelvic floor muscles to DROP (ie. Try Diaphragmatic Breathing).  Gently apply pressure over your bladder.  Change your pelvic position: lean forward, rock your pelvis forward and backward, stand up then sit back down.     Wait at least 30 seconds to 1 minute to see if a second urine stream begins.     Do not stop your urine stream or push/strain!          "

## 2024-12-10 NOTE — PROGRESS NOTES
[facility]   Physical Therapy Treatment Note     Name: Daniela Lawson  Clinic Number: [id number]    Therapy Diagnosis:   Encounter Diagnoses   Name Primary?    Decreased range of motion of lumbar spine Yes    Weakness of right hip      Physician: Miguel Tracy MD    Visit Date: 12/10/2024    Physician Orders: PT Eval and Treat   Medical Diagnosis from Referral:   G57.01 (ICD-10-CM) - Piriformis syndrome, right   M25.551 (ICD-10-CM) - Right hip pain      Evaluation Date: 11/5/2024  Authorization Period Expiration: 12/31/2024  Plan of Care Expiration: 01/05/2025  Progress Note Due: 12/05/2024  Visit # / Visits authorized: 5/20  FOTO: 1/3     Precautions: Standard and past medical history listed in evaluation       PTA Visit #: 0/5     FOTO first follow up:   FOTO second follow up:     Time In: 1301  Time Out: 1351  Total Billable Time: 50 minutes    SUBJECTIVE     Pt reports: tweaked her back yesterday after she was distracted while doing deadlifts at the gym     She was compliant with home exercise program.  Response to previous treatment: as above  Functional change: no pain, up stairs     Pain: 1/10  Location: right SIJ       OBJECTIVE   Painful lumbar flexion, extension, and single leg stance on Left.     Pain is over Left PSIS/sacroiliac joint     Supine to long sit: Long to short on Left   Flick test: Hypomobile on Left     Treatment     Daniela received the treatments listed below:      therapeutic exercises to develop strength, endurance, and ROM for 8 minutes including:    Upright bike x8mins level 4    manual therapy techniques: Joint mobilizations were applied to the: Left sacroiliac joint for 0 minutes, including:    Assessment as above   Prone sacroiliac joint manipulation     Lateral distraction with mob belt grade III-IV   Long axis distraction grade III-IV     neuromuscular re-education activities to improve: glute activation for 19 minutes. The following activities were  "included:    MET for Left anterior rotated innominate x10 with 6s hold   Bridges 3x12 with GreenTheraband 3s hold at top   Clamshells Green Theraband 3x12 each side   Single Leg balance with contralateral hip flexion/extension 3x30s each side     therapeutic activities to improve functional performance for 23 minutes, including:    Donkey kicks 12# 3x10 each side   Shuttle leg press 100# 3x10   Shuttle single leg press 37# 3x10 each side   Prowler sled push [id number] there and back no weight     Not today:   KB deadlift 20# off of 8" step 3 x 10   Step ups on 6" step 3x10 each side     Patient Education and Home Exercises     Home Exercises Provided and Patient Education Provided     Education provided:   - Continue Home Exercise Program     Written Home Exercises Provided: Patient instructed to cont prior HEP. Exercises were reviewed and Daniela was able to demonstrate them prior to the end of the session.  Daniela demonstrated good  understanding of the education provided. See EMR under Patient Instructions for exercises provided during therapy sessions    ASSESSMENT     Daniela presents to PT with reports of aggravating her lower back at the gym. She demonstrates painful lumbar Active range of motion and signs/symptoms consistent with an acute sacroiliac joint dysfunction. We performed manual interventions to address these deficits and she reports improved symptoms. PT discussed plan of care with patient and pending any change in status plan to discharge following next visit.     Daniela Is progressing well towards her goals.   Pt prognosis is Good.     Pt will continue to benefit from skilled outpatient physical therapy to address the deficits listed in the problem list box on initial evaluation, provide pt/family education and to maximize pt's level of independence in the home and community environment.     Pt's spiritual, cultural and educational needs considered and pt agreeable to plan of care and goals.   "   Anticipated barriers to physical therapy: chronicity of condition     Goals:  Short Term Goals: 4 weeks   Pt will be IND with initial HEP to manage symptoms outside of PT.   Pt will report Right hip pain improved by >/= 50% with squating to demonstrate improved condition.   Pt will improve MMT of lower extremity strength deficits by >/= 1/5 to improve tolerance for progressing rehab.   Pt will demonstrate full pain free lumbar Range of Motion to offload Right hip.      Long Term Goals: 6-8 weeks   Pt will improve FOTO score to >/= 67 to demonstrate improved functional mobility.  Pt will be IND with final HEP to maintain/improve strength and mobility gained in PT.   Pt will report Right hip pain improved by >/= 100% with step ups and workout classes to demonstrate improved condition.   Pt will improve MMT of lower extremity strength deficits to >/= 4+/ 5 to improve tolerance for lifting/carrying activities. .   Pt goal:  Pt will report confidence in managing her condition upon discharge from PT.    PLAN     Plan of care Certification: 11/5/2024 to 01/05/2025.     Continue plan of care with focus on progressively loading Right hip as tolerated to return to PLOF.     Kevin Richardson, PT, DPT  Board Certified Clinical Specialist in Orthopedic Physical Therapy  Board Certified Clinical Specialist in Sports Physical Therapy

## 2024-12-17 ENCOUNTER — CLINICAL SUPPORT (OUTPATIENT)
Dept: REHABILITATION | Facility: HOSPITAL | Age: 59
End: 2024-12-17
Payer: COMMERCIAL

## 2024-12-17 DIAGNOSIS — M53.86 DECREASED RANGE OF MOTION OF LUMBAR SPINE: Primary | ICD-10-CM

## 2024-12-17 DIAGNOSIS — R29.898 WEAKNESS OF RIGHT HIP: ICD-10-CM

## 2024-12-17 PROBLEM — M62.89 PELVIC FLOOR DYSFUNCTION: Status: ACTIVE | Noted: 2024-12-17

## 2024-12-17 PROCEDURE — 97530 THERAPEUTIC ACTIVITIES: CPT | Mod: PN

## 2024-12-17 PROCEDURE — 97110 THERAPEUTIC EXERCISES: CPT | Mod: PN

## 2024-12-17 PROCEDURE — 97112 NEUROMUSCULAR REEDUCATION: CPT | Mod: PN

## 2024-12-17 NOTE — PLAN OF CARE
[facility]   Physical Therapy Treatment Note     Name: Daniela Lawson  Clinic Number: [id number]    Therapy Diagnosis:   Encounter Diagnoses   Name Primary?    Decreased range of motion of lumbar spine Yes    Weakness of right hip      Physician: Miguel Tracy MD    Visit Date: 12/17/2024    Physician Orders: PT Eval and Treat   Medical Diagnosis from Referral:   G57.01 (ICD-10-CM) - Piriformis syndrome, right   M25.551 (ICD-10-CM) - Right hip pain      Evaluation Date: 11/5/2024  Authorization Period Expiration: 12/31/2024  Plan of Care Expiration: 01/05/2025  Progress Note Due: 12/05/2024  Visit # / Visits authorized: 5/20  FOTO: 1/3     Precautions: Standard and past medical history listed in evaluation       PTA Visit #: 0/5     FOTO first follow up:   FOTO second follow up:     Time In: 1308  Time Out: 1348  Total Billable Time: 40 minutes    SUBJECTIVE     Pt reports: hip and back are doing great now. She is ok with today being her last day, she is back to working out at the gym without issue.     She was compliant with home exercise program.  Response to previous treatment: as above  Functional change: no pain, up stairs     Pain: 1/10  Location: right SIJ       OBJECTIVE   Lower Extremity Strength  Right LE  Left LE    Iliospoas: 5/5 Iliospoas: 5/5   Hip extension:  4+/5 Hip extension: 4+/5   Hip abduction:  4+/5 Hip abduction:  4+/5   Hip ER:  4+/5 Hip ER: 4+/5   Hip IR: 5/5 Hip IR: 5/5         Treatment     Daniela received the treatments listed below:      therapeutic exercises to develop strength, endurance, and ROM for 16  minutes including:    Upright bike x8mins level 4  Assessment as above    manual therapy techniques: Joint mobilizations were applied to the: Left sacroiliac joint for 0 minutes, including:    Assessment as above   Prone sacroiliac joint manipulation     Lateral distraction with mob belt grade III-IV   Long axis distraction grade III-IV     neuromuscular  "re-education activities to improve: glute activation for 14 minutes. The following activities were included:    Bridges 3x12 with GreenTheraband 3s hold at top   Clamshells Green Theraband 3x12 each side   Single Leg balance with contralateral hip flexion/extension 3x30s each side     therapeutic activities to improve functional performance for 10 minutes, including:    Step ups on 6" step 3x10 each side   Lateral walks with Green Theraband [id number] there and back     Patient Education and Home Exercises     Home Exercises Provided and Patient Education Provided     Education provided:   - Continue Home Exercise Program     Written Home Exercises Provided: Patient instructed to cont prior HEP. Exercises were reviewed and Daniela was able to demonstrate them prior to the end of the session.  Daniela demonstrated good  understanding of the education provided. See EMR under Patient Instructions for exercises provided during therapy sessions    ASSESSMENT     Daniela presents to PT with reports of doing very well and that she would like today to be her last visit. She demonstrates improved lower extremity strength/endurance and reports little to no symptoms in her hip. She is independent with her Home Exercise Program and is appropriate for discharge from skilled outpatient PT.     Daniela Is progressing well towards her goals.   Pt prognosis is Good.     Pt will continue to benefit from skilled outpatient physical therapy to address the deficits listed in the problem list box on initial evaluation, provide pt/family education and to maximize pt's level of independence in the home and community environment.     Pt's spiritual, cultural and educational needs considered and pt agreeable to plan of care and goals.     Anticipated barriers to physical therapy: chronicity of condition     Goals:  Short Term Goals: 4 weeks MET  Pt will be IND with initial HEP to manage symptoms outside of PT.   Pt will report Right hip pain " improved by >/= 50% with squating to demonstrate improved condition.   Pt will improve MMT of lower extremity strength deficits by >/= 1/5 to improve tolerance for progressing rehab.   Pt will demonstrate full pain free lumbar Range of Motion to offload Right hip.      Long Term Goals: 6-8 weeks MET  Pt will improve FOTO score to >/= 67 to demonstrate improved functional mobility.  Pt will be IND with final HEP to maintain/improve strength and mobility gained in PT.   Pt will report Right hip pain improved by >/= 100% with step ups and workout classes to demonstrate improved condition.   Pt will improve MMT of lower extremity strength deficits to >/= 4+/ 5 to improve tolerance for lifting/carrying activities. .   Pt goal:  Pt will report confidence in managing her condition upon discharge from PT.    PLAN     Plan of care Certification: 11/5/2024 to 01/05/2025.     Discharge Summary     Date of Last visit: 12/17/2024  Total Visits Received: 6        Assessment    Goals: MET    Discharge reason: Patient has met all of his/her goals    Plan   This patient is discharged from Physical Therapy and is to cont with their HEP and MD follow up PRN.        Kevin Richardson PT, DPT      Kevin Richardson PT, DPT  Board Certified Clinical Specialist in Orthopedic Physical Therapy  Board Certified Clinical Specialist in Sports Physical Therapy

## 2024-12-17 NOTE — PROGRESS NOTES
[facility]   Physical Therapy Treatment Note     Name: Daniela Lawson  Clinic Number: [id number]    Therapy Diagnosis:   Encounter Diagnoses   Name Primary?    Decreased range of motion of lumbar spine Yes    Weakness of right hip      Physician: Miguel Tracy MD    Visit Date: 12/17/2024    Physician Orders: PT Eval and Treat   Medical Diagnosis from Referral:   G57.01 (ICD-10-CM) - Piriformis syndrome, right   M25.551 (ICD-10-CM) - Right hip pain      Evaluation Date: 11/5/2024  Authorization Period Expiration: 12/31/2024  Plan of Care Expiration: 01/05/2025  Progress Note Due: 12/05/2024  Visit # / Visits authorized: 5/20  FOTO: 1/3     Precautions: Standard and past medical history listed in evaluation       PTA Visit #: 0/5     FOTO first follow up:   FOTO second follow up:     Time In: 1308  Time Out: 1348  Total Billable Time: 40 minutes    SUBJECTIVE     Pt reports: hip and back are doing great now. She is ok with today being her last day, she is back to working out at the gym without issue.     She was compliant with home exercise program.  Response to previous treatment: as above  Functional change: no pain, up stairs     Pain: 1/10  Location: right SIJ       OBJECTIVE   Lower Extremity Strength  Right LE  Left LE    Iliospoas: 5/5 Iliospoas: 5/5   Hip extension:  4+/5 Hip extension: 4+/5   Hip abduction:  4+/5 Hip abduction:  4+/5   Hip ER:  4+/5 Hip ER: 4+/5   Hip IR: 5/5 Hip IR: 5/5         Treatment     Daniela received the treatments listed below:      therapeutic exercises to develop strength, endurance, and ROM for 16  minutes including:    Upright bike x8mins level 4  Assessment as above    manual therapy techniques: Joint mobilizations were applied to the: Left sacroiliac joint for 0 minutes, including:    Assessment as above   Prone sacroiliac joint manipulation     Lateral distraction with mob belt grade III-IV   Long axis distraction grade III-IV     neuromuscular  "re-education activities to improve: glute activation for 14 minutes. The following activities were included:    Bridges 3x12 with GreenTheraband 3s hold at top   Clamshells Green Theraband 3x12 each side   Single Leg balance with contralateral hip flexion/extension 3x30s each side     therapeutic activities to improve functional performance for 10 minutes, including:    Step ups on 6" step 3x10 each side   Lateral walks with Green Theraband [id number] there and back     Patient Education and Home Exercises     Home Exercises Provided and Patient Education Provided     Education provided:   - Continue Home Exercise Program     Written Home Exercises Provided: Patient instructed to cont prior HEP. Exercises were reviewed and Daniela was able to demonstrate them prior to the end of the session.  Daniela demonstrated good  understanding of the education provided. See EMR under Patient Instructions for exercises provided during therapy sessions    ASSESSMENT     Daniela presents to PT with reports of doing very well and that she would like today to be her last visit. She demonstrates improved lower extremity strength/endurance and reports little to no symptoms in her hip. She is independent with her Home Exercise Program and is appropriate for discharge from skilled outpatient PT.     Daniela Is progressing well towards her goals.   Pt prognosis is Good.     Pt will continue to benefit from skilled outpatient physical therapy to address the deficits listed in the problem list box on initial evaluation, provide pt/family education and to maximize pt's level of independence in the home and community environment.     Pt's spiritual, cultural and educational needs considered and pt agreeable to plan of care and goals.     Anticipated barriers to physical therapy: chronicity of condition     Goals:  Short Term Goals: 4 weeks MET  Pt will be IND with initial HEP to manage symptoms outside of PT.   Pt will report Right hip pain " improved by >/= 50% with squating to demonstrate improved condition.   Pt will improve MMT of lower extremity strength deficits by >/= 1/5 to improve tolerance for progressing rehab.   Pt will demonstrate full pain free lumbar Range of Motion to offload Right hip.      Long Term Goals: 6-8 weeks MET  Pt will improve FOTO score to >/= 67 to demonstrate improved functional mobility.  Pt will be IND with final HEP to maintain/improve strength and mobility gained in PT.   Pt will report Right hip pain improved by >/= 100% with step ups and workout classes to demonstrate improved condition.   Pt will improve MMT of lower extremity strength deficits to >/= 4+/ 5 to improve tolerance for lifting/carrying activities. .   Pt goal:  Pt will report confidence in managing her condition upon discharge from PT.    PLAN     Plan of care Certification: 11/5/2024 to 01/05/2025.     Discharge Summary     Date of Last visit: 12/17/2024  Total Visits Received: 6        Assessment    Goals: MET    Discharge reason: Patient has met all of his/her goals    Plan   This patient is discharged from Physical Therapy and is to cont with their HEP and MD follow up PRN.        Kevin Richardson PT, DPT      Kevin Richardson PT, DPT  Board Certified Clinical Specialist in Orthopedic Physical Therapy  Board Certified Clinical Specialist in Sports Physical Therapy

## 2024-12-19 ENCOUNTER — CLINICAL SUPPORT (OUTPATIENT)
Dept: REHABILITATION | Facility: HOSPITAL | Age: 59
End: 2024-12-19
Payer: COMMERCIAL

## 2024-12-19 ENCOUNTER — OFFICE VISIT (OUTPATIENT)
Dept: UROGYNECOLOGY | Facility: CLINIC | Age: 59
End: 2024-12-19
Payer: COMMERCIAL

## 2024-12-19 DIAGNOSIS — N39.46 MIXED INCONTINENCE URGE AND STRESS: ICD-10-CM

## 2024-12-19 DIAGNOSIS — N81.6 RECTOCELE: ICD-10-CM

## 2024-12-19 DIAGNOSIS — N81.11 CYSTOCELE, MIDLINE: ICD-10-CM

## 2024-12-19 DIAGNOSIS — R35.0 URINARY FREQUENCY: Primary | ICD-10-CM

## 2024-12-19 DIAGNOSIS — M62.89 PELVIC FLOOR DYSFUNCTION: Primary | ICD-10-CM

## 2024-12-19 DIAGNOSIS — Z46.89 PESSARY MAINTENANCE: ICD-10-CM

## 2024-12-19 DIAGNOSIS — N81.4 UTERINE PROLAPSE: ICD-10-CM

## 2024-12-19 DIAGNOSIS — N95.2 ATROPHIC VAGINITIS: ICD-10-CM

## 2024-12-19 LAB
BILIRUBIN, UA POC OHS: NEGATIVE
BLOOD, UA POC OHS: NEGATIVE
CLARITY, UA POC OHS: CLEAR
COLOR, UA POC OHS: YELLOW
GLUCOSE, UA POC OHS: NEGATIVE
KETONES, UA POC OHS: NEGATIVE
LEUKOCYTES, UA POC OHS: NEGATIVE
NITRITE, UA POC OHS: NEGATIVE
PH, UA POC OHS: 6
PROTEIN, UA POC OHS: NEGATIVE
SPECIFIC GRAVITY, UA POC OHS: 1.02
UROBILINOGEN, UA POC OHS: 0.2

## 2024-12-19 PROCEDURE — 97112 NEUROMUSCULAR REEDUCATION: CPT | Mod: PO

## 2024-12-19 PROCEDURE — 99999 PR PBB SHADOW E&M-EST. PATIENT-LVL III: CPT | Mod: PBBFAC,,, | Performed by: NURSE PRACTITIONER

## 2024-12-19 NOTE — PROGRESS NOTES
Subjective:       Patient ID: Daniela Lawson is a 59 y.o. female.    Chief Complaint: Pessary Check    HPI  Daniela Lawson is a 59 y.o. female.  Who presents today for routine pessary maintenance.  She was last seen in our office on 11/19/24.  At that visit we placed a #4 ring pessary.  She did have this come out 1 time on her when she was having a bowel movement.  She was able to catch the pessary and clean it and insert it herself she has not had any problems since then.  She has been removing and replacing the pessary herself at times throughout the past month.  She denies any real vaginal discharge or bleeding at this point.  She is going to pelvic floor physical therapy later today so we will remove the pessary and leave it out while she goes to PT. she feels that she is able to do her daily routine much better without the prolapse getting in the way.  She does find that she has to be a little bit more careful when she is sneezing or she might have some leaks.  She is overall happy with the pessary at this time and ready to proceed with the maintenance.    Review of Systems   Constitutional:  Negative for activity change, fever and unexpected weight change.   HENT:  Negative for hearing loss.    Eyes:  Negative for visual disturbance.   Respiratory:  Negative for shortness of breath and wheezing.    Cardiovascular:  Negative for chest pain, palpitations and leg swelling.   Gastrointestinal:  Negative for abdominal pain, constipation and diarrhea.   Genitourinary:  Positive for frequency. Negative for dyspareunia, dysuria, urgency, vaginal bleeding and vaginal discharge.   Musculoskeletal:  Negative for gait problem and neck pain.   Skin:  Negative for rash and wound.   Allergic/Immunologic: Negative for immunocompromised state.   Neurological:  Negative for tremors, speech difficulty and weakness.   Hematological:  Does not bruise/bleed easily.   Psychiatric/Behavioral:  Negative for agitation and  confusion.        Objective:      Physical Exam  Vitals reviewed. Exam conducted with a chaperone present.   Constitutional:       General: She is not in acute distress.     Appearance: She is well-developed.   HENT:      Head: Normocephalic and atraumatic.   Neck:      Thyroid: No thyromegaly.   Pulmonary:      Effort: Pulmonary effort is normal. No respiratory distress.   Abdominal:      Palpations: Abdomen is soft.      Tenderness: There is no abdominal tenderness.      Hernia: No hernia is present.   Musculoskeletal:         General: Normal range of motion.      Cervical back: Normal range of motion.   Skin:     General: Skin is warm and dry.      Findings: No rash.   Neurological:      Mental Status: She is alert and oriented to person, place, and time.   Psychiatric:         Mood and Affect: Mood normal.         Behavior: Behavior normal.         Thought Content: Thought content normal.       Pelvic Exam:  V: No lesions. No palpable nodes.   Va:  No discharge or bleeding. Aa -1; Ba -1; C -5; Ap +1; Bp +1.  Genital hiatus 4, perineal body 3, total vaginal length 10.    Meatus:No caruncle or stenosis  Urethra: Non tender. No suburethral masses.  Cx/Cuff: Normal   Uterus:  Surgically absent  Ad: No mass or tenderness.  Levators :Symmetrical. Normal tone. Non tender.  BL: Non tender  RV: No hemorrhoids.      Assessment:       1. Urinary frequency    2. Mixed incontinence urge and stress    3. Cystocele, midline    4. Rectocele    5. Uterine prolapse    6. Pessary maintenance    7. Atrophic vaginitis          Procedure note- #4 ring pessary removed and cleaned with soap and water and sent with the patient.  We will leave it out at this time as she is getting ready to go to pelvic floor PT directly after our appointment.  After  NP reminded pt that the first 24-48 hours are the most likely time for the pessary to fall out and to monitor the toilet before flushing.  Pt verbalized understanding.      Plan:        Urinary frequency monitor  -     POCT Urinalysis(Instrument)    Mixed incontinence urge and stress monitor    Cystocele, midline continue with ring pessary    Rectocele as noted above    Uterine prolapse as noted above    Pessary maintenance as noted above    Atrophic vaginitis monitor    RTC three-month

## 2024-12-19 NOTE — PROGRESS NOTES
Pelvic Health Physical Therapy   Treatment Note     Name: Daniela Lawson  Clinic Number: [id number]    Therapy Diagnosis:   Encounter Diagnosis   Name Primary?    Pelvic floor dysfunction Yes     Physician: YOUSIF Cisneros,*    Visit Date: 12/19/2024    Physician Orders: PT Eval and Treat   Medical Diagnosis from Referral: Urinary incontinence, mixed [N39.46], Urinary frequency [R35.0], Cystocele, midline [N81.11]   Evaluation Date: 12/10/2024  Plan of Care Expiration: 3/10/2025  Visit # / Visits authorized: 2 total      FOTO 1 /3 on 12/10/2024        Time In: 915  Time Out: 1000  Total Appointment Time (timed & untimed codes): 45 minutes     Precautions: universal     Subjective     Pt reports: doing well, no questions after last appointment. She did get a squatty potty, getting used to that and is trying to implement education from last time.    She was compliant with home exercise program.  Response to previous treatment: tolerated well   Functional change: ongoing    Pain: 0/10  Location:  not applicable      Objective   Informed verbal consent provided 12/19/2024 prior to intravaginal treatment.  Chaperone: declined        VAGINAL PELVIC FLOOR EXAM    EXTERNAL ASSESSMENT  Introitus: WNL  Skin condition: WNL  Scarring: none   Sensation: WNL   Pain: none  Voluntary contraction: visible lift  Voluntary relaxation: visible drop  Involuntary contraction: reflex tightening  Bearing down: bulge  Perineal descent: present (mild)      INTERNAL ASSESSMENT  Pain: none   Sensation: able to localized pressure appropriately   Vaginal vault: roomy   Muscle Bulk: atrophy   Muscle Power: 3/5  Muscle Endurance: 3 sec    Quality of contraction: decreased hold   Specificity: WNL   Coordination: tends to hold breath during PFM contration   Prolapse check: descent of anterior vaginal wall, within vaginal vault   Comments: able to bulge pelvic floor muscles with verbal cues for eccentric transverse abdominis and can maintain  with exhale. With observation, reflexive contraction noted with cough, but with intravaginal assessment bulge noted with cough.       Daniela participated in neuromuscular re-education activities for 45 minutes including:   bearing down with abdominal release  Kegels         Home Exercises Provided and Patient Education Provided     Education provided:   - anatomy/physiology of pelvic floor, double voiding techniques, kegels, and proper bearing down techniques  Discussed progression of plan of care with patient; educated pt in activity modification; reviewed HEP with pt. Pt demonstrated and verbalized understanding of all instruction and was provided with a handout of HEP (see Patient Instructions).      Written Home Exercises Provided: yes.  Exercises were reviewed and Daniela was able to demonstrate them prior to the end of the session.  Daniela demonstrated good  understanding of the education provided.     See EMR under Patient Instructions for exercises provided 12/19/2024.    Assessment     Intravaginal PF exam performed per patient consent. Patient demo decreased muscle strength and endurance, as well as bearing down with stress on anterior vaginal wall. Updated home exercise program and reviewed double voiding/bearing down techniques.     Daniela Is progressing well towards her goals.   Pt prognosis is Good.     Pt will continue to benefit from skilled outpatient physical therapy to address the deficits listed in the problem list box on initial evaluation, provide pt/family education and to maximize pt's level of independence in the home and community environment.     Pt's spiritual, cultural and educational needs considered and pt agreeable to plan of care and goals.     Anticipated barriers to physical therapy: none     Goals:  Short Term Goals: 6 weeks   - Pt will demonstrate excellent knowledge and adherence to HEP to facilitate optimal recovery.  - Pt will demonstrate proper PFM contraction, relaxation,  and lengthening coordinated with TA and breath for improved muscle coordination needed for functional activity.     Long Term Goals: 12 weeks   - Pt will demonstrate excellent knowledge and adherence to HEP for continued self-maintenance of symptoms.  - Pt will report FOTO score of 10% improvement or more indicating clinically relevant increase in function.  - Pt will report voiding interval of 2-3 hours for improved ADL tolerance.  - Pt will report ability to delay urinary urge for at least 15 minutes to maintain continence with ADL/IADLs.   - Pt will report little (drops) to no incidence of urinary incontinence 7/7 days for improved hygiene and ADL/IADL tolerance.   - Pt will demonstrate PFM strength of at least 3/5 MMT for improved strength needed to maintain continence.   - Pt will report bearing down appropriately 100% of the time for improved bowel function and decreased stress on adjacent pelvic structures.   - Pt will demonstrate independence with pressure-management strategies to decreased stress on adjacent pelvic structures.     Plan     Cont per plan of care     Vikki Melchor, PT

## 2024-12-19 NOTE — PATIENT INSTRUCTIONS
"KEGELS  1. Lay or Sit comfortably with legs and buttocks relaxed.  2. Contract and LIFT the pelvic floor muscles as if you're trying to stop the stream of urine and passage of gas.  3. Hold LIFT for 5 seconds without holding breath. You should be able to talk out loud the entire time.  4. Release the pelvic muscles right away for 10 seconds rest.  5. Repeat 10 times, 3 sets per day. Spread throughout the day.   No Kegels while urinating!       __________________________________________________    Bowel Movement Mechanics  1. Sit on the toilet comfortably with legs and buttocks relaxed.  2. Put your feet on a waste basket or squatty potty  3. Lean forward while keeping your back straight, forearms rest on knees.  4. Keep your knees apart.  5. Fully relax pelvic floor muscles - think about softening, opening, dropping  6. Use gentle belly breaths and bulge lower abdominals like making a beer belly  7. Keep belly big on exhalation  8. Exhale like blowing out birthday candles  9. Keep breathing like this through entire bowel movement  10. Make sure to give enough time, ok for it to take several minutes     Do not strain and Do not hold your breath.       (Search "Squatty Potty" on [employer])       __________________________________________________        DOUBLE VOIDING - Double voiding is a technique that may assist the bladder to empty more effectively when  urine is left in the bladder. It involves passing  urine more than once each time that you go to  the toilet. This makes sure that the bladder is  completely empty.     Here are 3 strategies you can try to fully empty your bladder.  You do not have to do all of these things every single time. Find which ones work best for you.      Check to make sure your pelvic floor is relaxed   Do a body scan - make sure your legs, buttocks, and abdominals are relaxed.  Take a couple deep, slow breaths to encourage your pelvic floor muscles to DROP (ie. Try Diaphragmatic " Breathing).  Gently apply pressure over your bladder.  Change your pelvic position: lean forward, rock your pelvis forward and backward, stand up then sit back down.      Wait at least 30 seconds to 1 minute to see if a second urine stream begins.      Do not stop your urine stream or push/strain!

## 2025-01-06 ENCOUNTER — PATIENT MESSAGE (OUTPATIENT)
Dept: FAMILY MEDICINE | Facility: CLINIC | Age: 60
End: 2025-01-06
Payer: COMMERCIAL

## 2025-01-06 DIAGNOSIS — J01.40 ACUTE NON-RECURRENT PANSINUSITIS: Primary | ICD-10-CM

## 2025-01-07 ENCOUNTER — PATIENT MESSAGE (OUTPATIENT)
Dept: REHABILITATION | Facility: HOSPITAL | Age: 60
End: 2025-01-07
Payer: COMMERCIAL

## 2025-01-07 RX ORDER — AZITHROMYCIN 250 MG/1
TABLET, FILM COATED ORAL
Qty: 6 TABLET | Refills: 0 | Status: SHIPPED | OUTPATIENT
Start: 2025-01-07 | End: 2025-01-12

## 2025-01-13 NOTE — PROGRESS NOTES
Pelvic Health Physical Therapy   Treatment Note     Name: Daniela Lawson  Clinic Number: [id number]    Therapy Diagnosis:   Encounter Diagnosis   Name Primary?    Pelvic floor dysfunction Yes       Physician: YOUSIF Cisneros,*    Visit Date: 1/14/2025    Physician Orders: PT Eval and Treat   Medical Diagnosis from Referral: Urinary incontinence, mixed [N39.46], Urinary frequency [R35.0], Cystocele, midline [N81.11]   Evaluation Date: 12/10/2024  Plan of Care Expiration: 3/10/2025  Visit # / Visits authorized: 2/20     FOTO 1 /3 on 12/10/2024     Time In: 2:35 pm   Time Out: 3:15 pm   Total Appointment Time (timed & untimed codes): 40 minutes     Precautions: universal     Subjective     Pt reports: she has been sick for the past week and was coughing a lot. States she coughed so hard her pessary fell out. States she was doing well with her exercises until last week but she is trying to get back to them. Reports squatty potty has been very, very helpful. States her incontinence with coughing and sneezing was improving before she got sick.     She was somewhat compliant with home exercise program.  Response to previous treatment: tolerated well   Functional change: improved bowel movement initiation, decreasing urinary incontinence     Pain: 0/10  Location:  not applicable      Objective   Informed verbal consent provided 12/19/2024 prior to intravaginal treatment. - intravaginal treatment not performed today   Chaperone: declined        VAGINAL PELVIC FLOOR EXAM 12/19/2024    EXTERNAL ASSESSMENT  Introitus: WNL  Skin condition: WNL  Scarring: none   Sensation: WNL   Pain: none  Voluntary contraction: visible lift  Voluntary relaxation: visible drop  Involuntary contraction: reflex tightening  Bearing down: bulge  Perineal descent: present (mild)      INTERNAL ASSESSMENT  Pain: none   Sensation: able to localized pressure appropriately   Vaginal vault: roomy   Muscle Bulk: atrophy   Muscle Power: 3/5  Muscle  Endurance: 3 sec    Quality of contraction: decreased hold   Specificity: WNL   Coordination: tends to hold breath during PFM contration   Prolapse check: descent of anterior vaginal wall, within vaginal vault   Comments: able to bulge pelvic floor muscles with verbal cues for eccentric transverse abdominis and can maintain with exhale. With observation, reflexive contraction noted with cough, but with intravaginal assessment bulge noted with cough.     Daniela participated in therapeutic activities for 16 minutes including:   [x] 2x10 side-lying hip abduction (R&L)  [x] Education on relationship between TrA and pelvic floor muscles  [x] Education on hip strengthening to reduce stress urinary incontinence   [x] Education on the knack to reduce stress urinary incontinence  [x] HEP building/HEP review    Daniela participated in neuromuscular re-education activities for 24 minutes including:   [x] 2x10 TrA brace + supine straight leg raise (R&L)  [x] 2x10 TrA brace + pelvic floor muscle squeeze + supine glut bridge with 4 sec hold  [x] 2x10 side-lying clams with focus on pelvic girdle stability (R&L)  [x] 2x10 side-lying reverse clams with focus on pelvic girdle stability (R&L)  [x] 45 ft TrA brace + pelvic floor muscle squeeze + squat with 12 lb dumbbell   [x] 45 ft TrA brace + suitcase carry marches with 12 lb dumbbell       Home Exercises Provided and Patient Education Provided     Education provided:   Relationship between TrA and pelvic floor muscles, hip strengthening to reduce stress urinary incontinence, the knack   Discussed progression of plan of care with patient; educated pt in activity modification; reviewed HEP with pt. Pt demonstrated and verbalized understanding of all instruction and was provided with a handout of HEP (see Patient Instructions).    Written Home Exercises Provided: yes.  Exercises were reviewed and Daniela was able to demonstrate them prior to the end of the session.  Daniela  demonstrated good  understanding of the education provided.     See EMR under Patient Instructions for exercises provided 1/14/2025    Assessment     Pt tolerated session well today, with good understanding of education provided. Pt initially required verbal cuing to synch breath with TrA brace and pelvic floor muscle contraction with movement during exercises; pt able to perform independently towards of end of session. Pt reports of pelvic floor muscle fatigue towards end of session is consistent with dosage necessary for strength gains. Pt will benefit from continued hip, core, and pelvic floor muscle strengthening in upcoming sessions.     Daniela Is progressing well towards her goals.   Pt prognosis is Good.     Pt will continue to benefit from skilled outpatient physical therapy to address the deficits listed in the problem list box on initial evaluation, provide pt/family education and to maximize pt's level of independence in the home and community environment.     Pt's spiritual, cultural and educational needs considered and pt agreeable to plan of care and goals.     Anticipated barriers to physical therapy: none     Goals:  Short Term Goals: 6 weeks   - Pt will demonstrate excellent knowledge and adherence to HEP to facilitate optimal recovery. - MET  - Pt will demonstrate proper PFM contraction, relaxation, and lengthening coordinated with TA and breath for improved muscle coordination needed for functional activity.     Long Term Goals: 12 weeks   - Pt will demonstrate excellent knowledge and adherence to HEP for continued self-maintenance of symptoms.  - Pt will report FOTO score of 10% improvement or more indicating clinically relevant increase in function.  - Pt will report voiding interval of 2-3 hours for improved ADL tolerance.  - Pt will report ability to delay urinary urge for at least 15 minutes to maintain continence with ADL/IADLs.   - Pt will report little (drops) to no incidence of urinary  incontinence 7/7 days for improved hygiene and ADL/IADL tolerance.   - Pt will demonstrate PFM strength of at least 3/5 MMT for improved strength needed to maintain continence.   - Pt will report bearing down appropriately 100% of the time for improved bowel function and decreased stress on adjacent pelvic structures.   - Pt will demonstrate independence with pressure-management strategies to decreased stress on adjacent pelvic structures.     Plan     Cont per plan of care     Next session: strengthening circuit     Kate Sandy, PT, DPT

## 2025-01-14 ENCOUNTER — CLINICAL SUPPORT (OUTPATIENT)
Dept: REHABILITATION | Facility: HOSPITAL | Age: 60
End: 2025-01-14
Payer: COMMERCIAL

## 2025-01-14 DIAGNOSIS — M62.89 PELVIC FLOOR DYSFUNCTION: Primary | ICD-10-CM

## 2025-01-14 PROCEDURE — 97530 THERAPEUTIC ACTIVITIES: CPT | Mod: PO

## 2025-01-14 PROCEDURE — 97112 NEUROMUSCULAR REEDUCATION: CPT | Mod: PO

## 2025-01-14 NOTE — PATIENT INSTRUCTIONS
Straight Leg Raise, 2-3 sets of 10  - Inhale to prepare, relax the belly and pelvic floor  - As you exhale, gently engage the transverse abdominis by drawing the belly button up and in to the spine  - Holding the hips level and the belly button down, gently lift one leg a few inches  - Inhale again to rest  *Don't hold your breath      Bridging + kegel (hold for 4 sec), 2-3 sets of 10  - Inhale to prepare, relax the belly and pelvic floor  - As you exhale, gently engage the transverse abdominis by drawing the belly button up and in towards the spine  - Squeeze your pelvic floor (kegel)  - Holding the belly button in, lift the hips (only lift as high as it is comfortable)- hold for 4 seconds, then lower  - Inhale again to rest  *Don't hold your breath        Side-lying clam, 2-3 sets of 10  - Holding the hips level, lift the top leg a few inches.   *Hold the hips stacked on top of each other, not rolling back with movement  *Don't hold your breath  *Roll the top hip more forward than you think        Side-lying reverse clam, 2-3 sets of 10  - Hold the knee still as you lift and lower the ankle  *Hold the hips stacked on top of each other, not rolling back with movement  *Don't hold your breath     +       Side-lying hip abduction, 2-3 sets of 10  - In side-lying, stack the hips on top of each other and lift the top leg a few inches. Hold the top hip in place, not rolling back with movement  *Don't hold your breath    Squat 2-3 sets of 10     Start with feet shoulder width apart (you can holding a Kettlebell at your chest level if you would like) Next, squat down. Squeeze your pelvic floor when you stand.     Suitcase carry, 3 rounds of 20-40ft  - Hold weight (5-15#) in one arm as you forward march for 20-40ft  *Keep your body up tall  *Don't hold your breath

## 2025-01-28 ENCOUNTER — CLINICAL SUPPORT (OUTPATIENT)
Dept: REHABILITATION | Facility: HOSPITAL | Age: 60
End: 2025-01-28
Payer: COMMERCIAL

## 2025-01-28 DIAGNOSIS — M62.89 PELVIC FLOOR DYSFUNCTION: Primary | ICD-10-CM

## 2025-01-28 PROCEDURE — 97530 THERAPEUTIC ACTIVITIES: CPT | Mod: PO

## 2025-01-28 PROCEDURE — 97112 NEUROMUSCULAR REEDUCATION: CPT | Mod: PO

## 2025-01-28 NOTE — PATIENT INSTRUCTIONS
Clamshells   - Start laying on your side, feet together and knees bent.   - place ball under your hand, keep elbow pointed out (not shown)   - Keep your feet together as you rotate your hip to bring your knee up while simultaneously exhaling and pressing into the ball with your arm. Don't let your pelvis rock forward.   - Perform 3 sets of 12 on each side.

## 2025-01-28 NOTE — PROGRESS NOTES
"  Pelvic Health Physical Therapy   Treatment Note     Name: Daniela Lawson  Clinic Number: [id number]    Therapy Diagnosis:   Encounter Diagnosis   Name Primary?    Pelvic floor dysfunction Yes         Physician: YOUSIF Cisneros,*    Visit Date: 1/28/2025    Physician Orders: PT Eval and Treat   Medical Diagnosis from Referral: Urinary incontinence, mixed [N39.46], Urinary frequency [R35.0], Cystocele, midline [N81.11]   Evaluation Date: 12/10/2024  Plan of Care Expiration: 3/10/2025  Visit # / Visits authorized: 4/20       Time In: 315 pm   Time Out: 400 pm   Total Appointment Time (timed & untimed codes): 45 minutes     Precautions: universal     Subjective     Pt reports: she's doing better - she was doing really bad when she was sick to the point where she coughed her pessary out.   Since getting well she hasn't had any issues. "If I squeeze when I sneeze I'm good."   Since she got over being sick she hasn't had any leakage. Able to sleep throughout the night last night without getting up to void.       She was somewhat compliant with home exercise program.  Response to previous treatment: tolerated well   Functional change: improved bowel movement initiation, decreasing urinary incontinence     Pain: 0/10  Location:  not applicable      Objective   Informed verbal consent provided 12/19/2024 prior to intravaginal treatment. - intravaginal treatment not performed today   Chaperone: declined        VAGINAL PELVIC FLOOR EXAM 12/19/2024    EXTERNAL ASSESSMENT  Introitus: WNL  Skin condition: WNL  Scarring: none   Sensation: WNL   Pain: none  Voluntary contraction: visible lift  Voluntary relaxation: visible drop  Involuntary contraction: reflex tightening  Bearing down: bulge  Perineal descent: present (mild)      INTERNAL ASSESSMENT  Pain: none   Sensation: able to localized pressure appropriately   Vaginal vault: roomy   Muscle Bulk: atrophy   Muscle Power: 3/5  Muscle Endurance: 3 sec    Quality of " contraction: decreased hold   Specificity: WNL   Coordination: tends to hold breath during PFM contration   Prolapse check: descent of anterior vaginal wall, within vaginal vault   Comments: able to bulge pelvic floor muscles with verbal cues for eccentric transverse abdominis and can maintain with exhale. With observation, reflexive contraction noted with cough, but with intravaginal assessment bulge noted with cough.     Daniela participated in therapeutic activities for 16 minutes including:   [x] 2x10 side-lying hip abduction (R&L)  [x] Education on relationship between TrA and pelvic floor muscles  [x] Education on hip strengthening to reduce stress urinary incontinence   [x] Education on the knack to reduce stress urinary incontinence  [x] HEP building/HEP review    Daniela participated in neuromuscular re-education activities for 24 minutes including:   [x] 2x10 TrA brace + supine straight leg raise (R&L)  [x] 2x10 TrA brace + pelvic floor muscle squeeze + supine glut bridge with 4 sec hold  [x] 2x10 side-lying clams with focus on pelvic girdle stability (R&L)  [x] 2x10 side-lying reverse clams with focus on pelvic girdle stability (R&L)  [x] 45 ft TrA brace + pelvic floor muscle squeeze + squat with 12 lb dumbbell   [x] 45 ft TrA brace + suitcase carry marches with 12 lb dumbbell       Home Exercises Provided and Patient Education Provided     Education provided:   Relationship between TrA and pelvic floor muscles, hip strengthening to reduce stress urinary incontinence, the knack   Discussed progression of plan of care with patient; educated pt in activity modification; reviewed HEP with pt. Pt demonstrated and verbalized understanding of all instruction and was provided with a handout of HEP (see Patient Instructions).    Written Home Exercises Provided: yes.  Exercises were reviewed and Daniela was able to demonstrate them prior to the end of the session.  Daniela demonstrated good  understanding of the  education provided.     See EMR under Patient Instructions for exercises provided 1/22/2025    Assessment     Pt tolerated session well today, with good understanding of education provided. Improved form with strengthening exercises noted and required very little cueing. Pt will benefit from continued hip, core, and pelvic floor muscle strengthening in upcoming sessions.     Daniela Is progressing well towards her goals.   Pt prognosis is Excellent.     Pt will continue to benefit from skilled outpatient physical therapy to address the deficits listed in the problem list box on initial evaluation, provide pt/family education and to maximize pt's level of independence in the home and community environment.     Pt's spiritual, cultural and educational needs considered and pt agreeable to plan of care and goals.     Anticipated barriers to physical therapy: none     Goals:  Short Term Goals: 6 weeks   - Pt will demonstrate excellent knowledge and adherence to HEP to facilitate optimal recovery. - MET  - Pt will demonstrate proper PFM contraction, relaxation, and lengthening coordinated with TA and breath for improved muscle coordination needed for functional activity.     Long Term Goals: 12 weeks   - Pt will demonstrate excellent knowledge and adherence to HEP for continued self-maintenance of symptoms.  - Pt will report FOTO score of 10% improvement or more indicating clinically relevant increase in function.  - Pt will report voiding interval of 2-3 hours for improved ADL tolerance.  - Pt will report ability to delay urinary urge for at least 15 minutes to maintain continence with ADL/IADLs.   - Pt will report little (drops) to no incidence of urinary incontinence 7/7 days for improved hygiene and ADL/IADL tolerance.   - Pt will demonstrate PFM strength of at least 3/5 MMT for improved strength needed to maintain continence.   - Pt will report bearing down appropriately 100% of the time for improved bowel function  and decreased stress on adjacent pelvic structures.   - Pt will demonstrate independence with pressure-management strategies to decreased stress on adjacent pelvic structures.     Plan     Cont per plan of care     Next session: strengthening circuit     Vikki Melchor, PT, DPT

## 2025-02-04 ENCOUNTER — OFFICE VISIT (OUTPATIENT)
Dept: ORTHOPEDICS | Facility: CLINIC | Age: 60
End: 2025-02-04
Payer: COMMERCIAL

## 2025-02-04 VITALS — WEIGHT: 168.19 LBS | HEIGHT: 63 IN | BODY MASS INDEX: 29.8 KG/M2

## 2025-02-04 DIAGNOSIS — G57.01 PIRIFORMIS SYNDROME, RIGHT: Primary | ICD-10-CM

## 2025-02-04 DIAGNOSIS — M67.951 TENDINOPATHY OF RIGHT GLUTEAL REGION: ICD-10-CM

## 2025-02-04 DIAGNOSIS — S73.191A TEAR OF RIGHT ACETABULAR LABRUM, INITIAL ENCOUNTER: Primary | ICD-10-CM

## 2025-02-04 DIAGNOSIS — M25.551 PAIN OF RIGHT HIP: ICD-10-CM

## 2025-02-04 PROCEDURE — 99999 PR PBB SHADOW E&M-EST. PATIENT-LVL II: CPT | Mod: PBBFAC,,, | Performed by: ORTHOPAEDIC SURGERY

## 2025-02-04 PROCEDURE — 3008F BODY MASS INDEX DOCD: CPT | Mod: CPTII,S$GLB,, | Performed by: ORTHOPAEDIC SURGERY

## 2025-02-04 PROCEDURE — 99214 OFFICE O/P EST MOD 30 MIN: CPT | Mod: S$GLB,,, | Performed by: ORTHOPAEDIC SURGERY

## 2025-02-04 NOTE — PROGRESS NOTES
Patient ID: Daniela Lawson is a 59 y.o. female    Chief Complaint:   Chief Complaint   Patient presents with    Right Hip - Pain       History of Present Illness:    Pleasant 59-year-old female here for evaluation of right hip pain.  She previously was seen by me and diagnosed with piriformis syndrome.  She saw Dr. Tracy and he performed a piriformis injection and we did formal physical therapy.  She did feel relief and at some point did have complete resolution of her symptoms.  Lately however she has had increased pain in the front of the thigh and this will wrap around to her lateral hip.  Difficulty laying on the right side.  Difficulty getting up from a seated position.    PAST MEDICAL HISTORY:   Past Medical History:   Diagnosis Date    Anesthesia 2009    Pt reports severe drop in BP when anesthesia given.    Arthritis     Asthma     Diverticulitis     GERD (gastroesophageal reflux disease)     Hypertension     Neuropathy     PONV (postoperative nausea and vomiting)     Stroke     RETINACULAR     Wears glasses      PAST SURGICAL HISTORY:   Past Surgical History:   Procedure Laterality Date     SECTION  1990    CHOLECYSTECTOMY      HYSTERECTOMY      INSERTION OF URETERAL CATHETER N/A 10/16/2019    Procedure: INSERTION, CATHETER, URETER;  Surgeon: Willard Ruffin MD;  Location: [facility] OR;  Service: Urology;  Laterality: N/A;    LAPAROSCOPIC SIGMOIDECTOMY N/A 10/16/2019    Procedure: COLECTOMY, SIGMOID, LAPAROSCOPIC;  Surgeon: Doug Gifford MD;  Location: [facility] OR;  Service: General;  Laterality: N/A;    ROBOT-ASSISTED LAPAROSCOPIC REPAIR OF INCISIONAL HERNIA N/A 2023    Procedure: ROBOTIC REPAIR, HERNIA, INCISIONAL;  Surgeon: Nj Penaloza MD;  Location: [facility] OR;  Service: General;  Laterality: N/A;    SHOULDER ARTHROSCOPY Bilateral     STOMACH SURGERY      Possible Nissen per pt- To treat acid reflux     TUBAL LIGATION  1990     FAMILY HISTORY:   Family History    Problem Relation Name Age of Onset    Coronary artery disease Mother      Hyperlipidemia Mother      Other Mother          Breast Nodules    Arthritis Mother      Hypertension Father      Atrial fibrillation Father      Neuropathy Father      Other Father          Bypass    Diabetes Father      Hypertension Sister      Cancer Sister          Breast Cacner    Neuropathy Sister      Breast cancer Sister      Hypertension Daughter      No Known Problems Son      Diabetes Maternal Grandmother      Hypertension Maternal Grandmother      No Known Problems Maternal Grandfather      Coronary artery disease Paternal Grandmother      Cancer Paternal Grandmother          Breast Cancer    Heart attack Paternal Grandmother      Breast cancer Paternal Grandmother      Dementia Paternal Grandfather      Heart attack Paternal Grandfather      No Known Problems Sister      Other Daughter          Benign Hypertension(Brain)    Breast cancer Maternal Aunt      Breast cancer Paternal Aunt       SOCIAL HISTORY:   Social History     Occupational History    Not on file   Tobacco Use    Smoking status: Never     Passive exposure: Never    Smokeless tobacco: Never   Substance and Sexual Activity    Alcohol use: Yes     Alcohol/week: 3.0 standard drinks of alcohol     Types: 3 Glasses of wine per week     Comment: 3 glasses of wine per week     Drug use: Never    Sexual activity: Not on file        MEDICATIONS:   Current Outpatient Medications:     aflibercept (EYLEA) 2 mg/0.05 mL Soln, 2 mg by Intravitreal route., Disp: , Rfl:     albuterol (PROVENTIL/VENTOLIN HFA) 90 mcg/actuation inhaler, Inhale 1-2 puffs into the lungs every 4 to 6 hours as needed for Wheezing or Shortness of Breath. Rescue, Disp: 18 g, Rfl: 3    amLODIPine (NORVASC) 2.5 MG tablet, Take 1 tablet (2.5 mg total) by mouth once daily., Disp: 90 tablet, Rfl: 1    aspirin (ECOTRIN) 81 MG EC tablet, Take 81 mg by mouth once daily., Disp: , Rfl:     cetirizine 10 mg chewable  tablet, Take 10 mg by mouth once daily., Disp: , Rfl:     cholecalciferol, vitamin D3, 125 mcg (5,000 unit) Tab, 5,000 Units once daily. , Disp: , Rfl:     cyanocobalamin, vitamin B-12, 5,000 mcg TbDL, Take 5,000 Units by mouth once daily. , Disp: , Rfl:     icosapent ethyL (VASCEPA) 1 gram Cap, TAKE 2 CAPSULES TWICE A DAY, Disp: 360 capsule, Rfl: 1    ketorolac 0.4% (ACULAR) 0.4 % Drop, Place 1 drop into both eyes 4 (four) times daily., Disp: , Rfl:     magnesium oxide 500 mg Cap, Take 1 capsule by mouth once daily., Disp: , Rfl:     methocarbamoL (ROBAXIN) 500 MG Tab, Take 1 tablet (500 mg total) by mouth 3 (three) times daily as needed (hip tightness)., Disp: 30 tablet, Rfl: 2    multivitamin capsule, Take 1 capsule by mouth once daily., Disp: , Rfl:     nebivoloL (BYSTOLIC) 5 MG Tab, Take 1 tablet (5 mg total) by mouth once daily., Disp: 90 tablet, Rfl: 1    nystatin-triamcinolone (MYCOLOG II) cream, Apply topically 2 (two) times daily., Disp: 60 g, Rfl: 2    progesterone (PROMETRIUM) 200 MG capsule, Take 200 mg by mouth every evening., Disp: , Rfl:     salmeteroL (SEREVENT) 50 mcg/dose diskus inhaler, Inhale 1 puff into the lungs 2 (two) times daily. Controller, Disp: 3 each, Rfl: 3    triamterene-hydrochlorothiazide 37.5-25 mg (MAXZIDE-25) 37.5-25 mg per tablet, Take 1 tablet by mouth once daily., Disp: 90 tablet, Rfl: 1    turmeric root extract 500 mg Cap, Take 500 mg by mouth once daily. , Disp: , Rfl:   ALLERGIES:   Review of patient's allergies indicates:   Allergen Reactions    Meperidine Other (See Comments)     MAKES HER VERY EVIL    Oxycodone-acetaminophen Itching    Lisinopril Other (See Comments)     Chest pain    Meloxicam Other (See Comments)     LEGS SWELL    Adhesive Hives         Physical Exam     There were no vitals filed for this visit.  Alert and oriented to person, place and time. No acute distress. Well-groomed, not ill appearing. Pupils round and reactive, normal respiratory effort, no  audible wheezing.     Gait: She  walks with a antalgic gait.                   EXTREMITIES:  Examination of lower extremities reveals there is no visible mass or deformity.        Right hip:  ROM(IR/ER) 35/60    .Negative FADIR test, positive SHAN test    .Negative Stinchfield test     Positive trochanteric pain.    Negative straight leg raise.    No warmth    No erythema    Pain with single leg heel stance        The skin over both lower extremities is normal and unremarkable.  She has a  painless range of motion of the knees and ankles bilaterally.   Sensation is intact in both lower extremities.    There are no motor deficits in the lower extremities bilaterally.   Pedal pulses are palpable distally bilaterally.    She has no calf tenderness to palpation nor edema.       Imaging:       X-Ray: I have reviewed all pertinent results/findings and my personal findings are:  Mild DJD of the right hip      Assessment & Plan    Piriformis syndrome, right    Tendinopathy of right gluteal region         Treatment options were discussed with the patient in detail. We discussed the patient's current imaging as well as differential diagnosis in detail. Based on the patient's symptoms of failure of conservative treatment, I do believe an MRI of the right hip is indicated to rule out damage to intra-articular structures including cartilage, tendons, and ligaments.     The patient will follow-up after MRI to discuss results and further treatment options. They will call the clinic with any questions or concerns.

## 2025-02-12 ENCOUNTER — HOSPITAL ENCOUNTER (OUTPATIENT)
Dept: RADIOLOGY | Facility: HOSPITAL | Age: 60
Discharge: HOME | End: 2025-02-12
Attending: ORTHOPAEDIC SURGERY
Payer: COMMERCIAL

## 2025-02-12 DIAGNOSIS — S73.191A TEAR OF RIGHT ACETABULAR LABRUM, INITIAL ENCOUNTER: ICD-10-CM

## 2025-02-12 PROCEDURE — 73721 MRI JNT OF LWR EXTRE W/O DYE: CPT | Mod: TC,RT

## 2025-02-12 PROCEDURE — 73721 MRI JNT OF LWR EXTRE W/O DYE: CPT | Mod: 26,RT,, | Performed by: RADIOLOGY

## 2025-02-14 ENCOUNTER — OFFICE VISIT (OUTPATIENT)
Dept: ORTHOPEDICS | Facility: CLINIC | Age: 60
End: 2025-02-14
Payer: COMMERCIAL

## 2025-02-14 VITALS — HEIGHT: 63 IN | WEIGHT: 168.19 LBS | BODY MASS INDEX: 29.8 KG/M2

## 2025-02-14 DIAGNOSIS — M67.951 TENDINOPATHY OF RIGHT GLUTEAL REGION: Primary | ICD-10-CM

## 2025-02-14 PROCEDURE — 99999 PR PBB SHADOW E&M-EST. PATIENT-LVL III: CPT | Mod: PBBFAC,,, | Performed by: ORTHOPAEDIC SURGERY

## 2025-02-14 RX ORDER — TRIAMCINOLONE ACETONIDE 40 MG/ML
40 INJECTION, SUSPENSION INTRA-ARTICULAR; INTRAMUSCULAR
Status: DISCONTINUED | OUTPATIENT
Start: 2025-02-14 | End: 2025-02-14 | Stop reason: HOSPADM

## 2025-02-14 RX ADMIN — TRIAMCINOLONE ACETONIDE 40 MG: 40 INJECTION, SUSPENSION INTRA-ARTICULAR; INTRAMUSCULAR at 01:02

## 2025-02-14 NOTE — PROGRESS NOTES
Patient ID: Daniela Lawson is a 59 y.o. female    Chief Complaint:   Chief Complaint   Patient presents with    Right Hip - Pain       History of Present Illness:    Pleasant 59-year-old female here for evaluation of right hip pain.  She previously was seen by me and diagnosed with piriformis syndrome.  She saw Dr. Tracy and he performed a piriformis injection and we did formal physical therapy.  She did feel relief and at some point did have complete resolution of her symptoms.  Lately however she has had increased pain in the front of the thigh and this will wrap around to her lateral hip.  Difficulty laying on the right side.  Difficulty getting up from a seated position.    ____________________________________________________________________    Interval history 2025 : Patient returns today for MRI follow-up of their right hip.  They report no changes since I saw them last.  Still with 2/10 pain mostly on the right hip especially at night.    PAST MEDICAL HISTORY:   Past Medical History:   Diagnosis Date    Anesthesia 2009    Pt reports severe drop in BP when anesthesia given.    Arthritis     Asthma     Diverticulitis     GERD (gastroesophageal reflux disease)     Hypertension     Neuropathy     PONV (postoperative nausea and vomiting)     Stroke     RETINACULAR     Wears glasses      PAST SURGICAL HISTORY:   Past Surgical History:   Procedure Laterality Date     SECTION  1990    CHOLECYSTECTOMY      HYSTERECTOMY      INSERTION OF URETERAL CATHETER N/A 10/16/2019    Procedure: INSERTION, CATHETER, URETER;  Surgeon: Willard Ruffin MD;  Location: [facility] OR;  Service: Urology;  Laterality: N/A;    LAPAROSCOPIC SIGMOIDECTOMY N/A 10/16/2019    Procedure: COLECTOMY, SIGMOID, LAPAROSCOPIC;  Surgeon: Doug Gifford MD;  Location: [facility] OR;  Service: General;  Laterality: N/A;    ROBOT-ASSISTED LAPAROSCOPIC REPAIR OF INCISIONAL HERNIA N/A 2023    Procedure: ROBOTIC REPAIR,  HERNIA, INCISIONAL;  Surgeon: Nj Penaloza MD;  Location: [facility] OR;  Service: General;  Laterality: N/A;    SHOULDER ARTHROSCOPY Bilateral     STOMACH SURGERY      Possible Nissen per pt- To treat acid reflux     TUBAL LIGATION  07/27/1990     FAMILY HISTORY:   Family History   Problem Relation Name Age of Onset    Coronary artery disease Mother      Hyperlipidemia Mother      Other Mother          Breast Nodules    Arthritis Mother      Hypertension Father      Atrial fibrillation Father      Neuropathy Father      Other Father          Bypass    Diabetes Father      Hypertension Sister      Cancer Sister          Breast Cacner    Neuropathy Sister      Breast cancer Sister      Hypertension Daughter      No Known Problems Son      Diabetes Maternal Grandmother      Hypertension Maternal Grandmother      No Known Problems Maternal Grandfather      Coronary artery disease Paternal Grandmother      Cancer Paternal Grandmother          Breast Cancer    Heart attack Paternal Grandmother      Breast cancer Paternal Grandmother      Dementia Paternal Grandfather      Heart attack Paternal Grandfather      No Known Problems Sister      Other Daughter          Benign Hypertension(Brain)    Breast cancer Maternal Aunt      Breast cancer Paternal Aunt       SOCIAL HISTORY:   Social History     Occupational History    Not on file   Tobacco Use    Smoking status: Never     Passive exposure: Never    Smokeless tobacco: Never   Substance and Sexual Activity    Alcohol use: Yes     Alcohol/week: 3.0 standard drinks of alcohol     Types: 3 Glasses of wine per week     Comment: 3 glasses of wine per week     Drug use: Never    Sexual activity: Not on file        MEDICATIONS:   Current Outpatient Medications:     aflibercept (EYLEA) 2 mg/0.05 mL Soln, 2 mg by Intravitreal route., Disp: , Rfl:     amLODIPine (NORVASC) 2.5 MG tablet, Take 1 tablet (2.5 mg total) by mouth once daily., Disp: 90 tablet, Rfl: 1    aspirin (ECOTRIN)  81 MG EC tablet, Take 81 mg by mouth once daily., Disp: , Rfl:     cetirizine 10 mg chewable tablet, Take 10 mg by mouth once daily., Disp: , Rfl:     cholecalciferol, vitamin D3, 125 mcg (5,000 unit) Tab, 5,000 Units once daily. , Disp: , Rfl:     cyanocobalamin, vitamin B-12, 5,000 mcg TbDL, Take 5,000 Units by mouth once daily. , Disp: , Rfl:     icosapent ethyL (VASCEPA) 1 gram Cap, TAKE 2 CAPSULES TWICE A DAY, Disp: 360 capsule, Rfl: 1    ketorolac 0.4% (ACULAR) 0.4 % Drop, Place 1 drop into both eyes 4 (four) times daily., Disp: , Rfl:     magnesium oxide 500 mg Cap, Take 1 capsule by mouth once daily., Disp: , Rfl:     methocarbamoL (ROBAXIN) 500 MG Tab, Take 1 tablet (500 mg total) by mouth 3 (three) times daily as needed (hip tightness)., Disp: 30 tablet, Rfl: 2    multivitamin capsule, Take 1 capsule by mouth once daily., Disp: , Rfl:     nebivoloL (BYSTOLIC) 5 MG Tab, Take 1 tablet (5 mg total) by mouth once daily., Disp: 90 tablet, Rfl: 1    nystatin-triamcinolone (MYCOLOG II) cream, Apply topically 2 (two) times daily., Disp: 60 g, Rfl: 2    progesterone (PROMETRIUM) 200 MG capsule, Take 200 mg by mouth every evening., Disp: , Rfl:     salmeteroL (SEREVENT) 50 mcg/dose diskus inhaler, Inhale 1 puff into the lungs 2 (two) times daily. Controller, Disp: 3 each, Rfl: 3    triamterene-hydrochlorothiazide 37.5-25 mg (MAXZIDE-25) 37.5-25 mg per tablet, Take 1 tablet by mouth once daily., Disp: 90 tablet, Rfl: 1    turmeric root extract 500 mg Cap, Take 500 mg by mouth once daily. , Disp: , Rfl:     albuterol (PROVENTIL/VENTOLIN HFA) 90 mcg/actuation inhaler, Inhale 1-2 puffs into the lungs every 4 to 6 hours as needed for Wheezing or Shortness of Breath. Rescue, Disp: 18 g, Rfl: 3  ALLERGIES:   Review of patient's allergies indicates:   Allergen Reactions    Meperidine Other (See Comments)     MAKES HER VERY EVIL    Oxycodone-acetaminophen Itching    Lisinopril Other (See Comments)     Chest pain    Meloxicam  Other (See Comments)     LEGS SWELL    Adhesive Hives         Physical Exam     There were no vitals filed for this visit.  Alert and oriented to person, place and time. No acute distress. Well-groomed, not ill appearing. Pupils round and reactive, normal respiratory effort, no audible wheezing.     Gait: She  walks with a antalgic gait.                   EXTREMITIES:  Examination of lower extremities reveals there is no visible mass or deformity.        Right hip:  ROM(IR/ER) 35/60    .Negative FADIR test, positive SHAN test    .Negative Stinchfield test     Positive trochanteric pain.    Negative straight leg raise.    No warmth    No erythema    Pain with single leg heel stance        The skin over both lower extremities is normal and unremarkable.  She has a  painless range of motion of the knees and ankles bilaterally.   Sensation is intact in both lower extremities.    There are no motor deficits in the lower extremities bilaterally.   Pedal pulses are palpable distally bilaterally.    She has no calf tenderness to palpation nor edema.       Imaging:       X-Ray: I have reviewed all pertinent results/findings and my personal findings are:  Mild DJD of the right hip  MRI of the right hip reviewed by me showing low-grade partial tear of the gluteal tendons with surrounding trochanteric bursitis    Assessment & Plan    Tendinopathy of right gluteal region  -     Ambulatory Referral/Consult to Physical Therapy/Occupational Therapy; Future; Expected date: 02/21/2025      I made the decision to obtain old records of the patient including previous notes and imaging. New imaging, if ordered today of the extremity or extremities, were evaluated. I independently reviewed and interpreted the radiographs and/or MRIs/CT scan today as well as prior imaging. I also reviewed the referring provider's documentation as well as any pertinent labs, tests and imaging.     Daniela Lawson is a 59 y.o. female with right hip  trochanteric bursitis, gluteal tendinopathy    Treatment options were discussed in detail with the patient. We discussed multiple options including non-operative and operative treatment options.   Non-operatively we discussed bracing, physical therapy and injections. Operatively we discussed Tenex procedure and the expectations of surgery long term as well as the rehabilitation associated with surgery as well as risks and benefits of surgery.     After shared medical decision-making with the patient, patient would like to proceed with a trial of:     Corticosteroid Injection right trochanteric bursa   Formal Physical Therapy, dry needling and cupping.  External referral placed    We can consider Tenex if pain is refractory to conservative treatment    All questions were answered and patient is agreeable to the above plan.

## 2025-02-14 NOTE — PROCEDURES
Large Joint Aspiration/Injection: R greater trochanteric bursa    Date/Time: 2/14/2025 1:00 PM    Performed by: Davidson Grover MD  Authorized by: Davidson Grover MD    Consent Done?:  Yes (Verbal)  Indications:  Pain  Timeout: prior to procedure the correct patient, procedure, and site was verified      Local anesthesia used?: Yes    Local anesthetic:  Topical anesthetic and lidocaine 1% without epinephrine  Anesthetic total (ml):  3      Details:  Needle Size:  22 G  Approach:  Lateral  Location:  Hip  Site:  R greater trochanteric bursa  Medications:  40 mg triamcinolone acetonide 40 mg/mL  Patient tolerance:  Patient tolerated the procedure well with no immediate complications

## 2025-03-06 ENCOUNTER — OFFICE VISIT (OUTPATIENT)
Dept: DERMATOLOGY | Facility: CLINIC | Age: 60
End: 2025-03-06
Payer: COMMERCIAL

## 2025-03-06 DIAGNOSIS — L82.1 SEBORRHEIC KERATOSIS: Primary | ICD-10-CM

## 2025-03-06 DIAGNOSIS — L82.0 SEBORRHEIC KERATOSES, INFLAMED: ICD-10-CM

## 2025-03-06 DIAGNOSIS — L81.3 CAFÉ AU LAIT SPOT: ICD-10-CM

## 2025-03-06 DIAGNOSIS — D18.01 CHERRY ANGIOMA: ICD-10-CM

## 2025-03-06 DIAGNOSIS — D22.9 MULTIPLE BENIGN NEVI: ICD-10-CM

## 2025-03-06 DIAGNOSIS — L81.4 LENTIGO: ICD-10-CM

## 2025-03-06 DIAGNOSIS — L57.8 ACTINIC SKIN DAMAGE: ICD-10-CM

## 2025-03-06 PROCEDURE — 1160F RVW MEDS BY RX/DR IN RCRD: CPT | Mod: CPTII,S$GLB,, | Performed by: STUDENT IN AN ORGANIZED HEALTH CARE EDUCATION/TRAINING PROGRAM

## 2025-03-06 PROCEDURE — 1159F MED LIST DOCD IN RCRD: CPT | Mod: CPTII,S$GLB,, | Performed by: STUDENT IN AN ORGANIZED HEALTH CARE EDUCATION/TRAINING PROGRAM

## 2025-03-06 PROCEDURE — 17110 DESTRUCTION B9 LES UP TO 14: CPT | Mod: S$GLB,,, | Performed by: STUDENT IN AN ORGANIZED HEALTH CARE EDUCATION/TRAINING PROGRAM

## 2025-03-06 PROCEDURE — 99213 OFFICE O/P EST LOW 20 MIN: CPT | Mod: 25,S$GLB,, | Performed by: STUDENT IN AN ORGANIZED HEALTH CARE EDUCATION/TRAINING PROGRAM

## 2025-03-06 NOTE — PROGRESS NOTES
Subjective:      Patient ID:  Daniela Lawson is a 59 y.o. female who presents for   Chief Complaint   Patient presents with    Spot     back     LOV 8/3/23    Patient here today for skin check TBSE  Complains of spot on back that was slightly itchy.  Complains of spot on left chest. Picks it off but it comes back,    Derm HX:   PHX of Pre- CA treated with Cry by Dr. Ish Zepeda   Fhx of Skin CA:   Dad - MM   paternal uncle, MM  Mom - SCC           Review of Systems   Constitutional:  Negative for fever, chills and fatigue.   Respiratory:  Negative for cough and shortness of breath.    Gastrointestinal:  Negative for nausea and vomiting.   Skin:  Positive for activity-related sunscreen use and wears hat. Negative for daily sunscreen use.   Hematologic/Lymphatic: Bruises/bleeds easily.       Objective:   Physical Exam   Constitutional: She appears well-developed and well-nourished. No distress.   Neurological: She is alert and oriented to person, place, and time. She is not disoriented.   Psychiatric: She has a normal mood and affect.   Skin:   Areas Examined (abnormalities noted in diagram):   Scalp / Hair Palpated and Inspected  Head / Face Inspection Performed  Neck Inspection Performed  Chest / Axilla Inspection Performed  Abdomen Inspection Performed  Genitals / Buttocks / Groin Inspection Performed  Back Inspection Performed  RUE Inspected  LUE Inspection Performed  RLE Inspected  LLE Inspection Performed  Nails and Digits Inspection Performed                 Diagram Legend     Erythematous scaling macule/papule c/w actinic keratosis       Vascular papule c/w angioma      Pigmented verrucoid papule/plaque c/w seborrheic keratosis      Yellow umbilicated papule c/w sebaceous hyperplasia      Irregularly shaped tan macule c/w lentigo     1-2 mm smooth white papules consistent with Milia      Movable subcutaneous cyst with punctum c/w epidermal inclusion cyst      Subcutaneous movable cyst c/w pilar cyst       Firm pink to brown papule c/w dermatofibroma      Pedunculated fleshy papule(s) c/w skin tag(s)      Evenly pigmented macule c/w junctional nevus     Mildly variegated pigmented, slightly irregular-bordered macule c/w mildly atypical nevus      Flesh colored to evenly pigmented papule c/w intradermal nevus       Pink pearly papule/plaque c/w basal cell carcinoma      Erythematous hyperkeratotic cursted plaque c/w SCC      Surgical scar with no sign of skin cancer recurrence      Open and closed comedones      Inflammatory papules and pustules      Verrucoid papule consistent consistent with wart     Erythematous eczematous patches and plaques     Dystrophic onycholytic nail with subungual debris c/w onychomycosis     Umbilicated papule    Erythematous-base heme-crusted tan verrucoid plaque consistent with inflamed seborrheic keratosis     Erythematous Silvery Scaling Plaque c/w Psoriasis     See annotation      Assessment / Plan:        Seborrheic keratosis  These are benign inherited growths without a malignant potential. Reassurance given to patient. No treatment is necessary.     Cherry angioma  This is a benign vascular lesion. Reassurance given. No treatment required.     Multiple benign nevi  Careful dermoscopy evaluation of nevi performed with none identified as needing biopsy today  Monitor for new mole or moles that are becoming bigger, darker, irritated, or developing irregular borders.     Seborrheic keratoses, inflamed  Cryosurgery procedure note:    Verbal consent from the patient is obtained. Liquid nitrogen cryosurgery is applied to 1 lesions to produce a freeze injury. The patient is aware that blisters may form and is instructed on wound care with gentle cleansing and use of vaseline ointment to keep moist until healed. The patient is supplied a handout on cryosurgery and is instructed to call if lesions do not completely resolve.    Lentigo  This is a benign hyperpigmented sun induced lesion. Daily  sun protection will reduce the number of new lesions. Treatment of these benign lesions are considered cosmetic.    Actinic skin damage  Total body skin examination performed today including at least 12 points as noted in physical examination. No lesions suspicious for malignancy noted.  Patient instructed in importance in daily broad spectrum sun protection of at least spf 30. Mineral sunscreen ingredients preferred (Zinc +/- Titanium) and can be found OTC.   Patient encouraged to wear hat for all outdoor exposure.   Also discussed sun avoidance and use of protective clothing.      Café au lait spot  Reassurance              No follow-ups on file.

## 2025-03-06 NOTE — PATIENT INSTRUCTIONS

## 2025-03-26 ENCOUNTER — PATIENT MESSAGE (OUTPATIENT)
Dept: FAMILY MEDICINE | Facility: CLINIC | Age: 60
End: 2025-03-26
Payer: COMMERCIAL

## 2025-03-26 DIAGNOSIS — Z01.818 PRE-OP EXAMINATION: Primary | ICD-10-CM

## 2025-05-16 ENCOUNTER — PATIENT MESSAGE (OUTPATIENT)
Dept: FAMILY MEDICINE | Facility: CLINIC | Age: 60
End: 2025-05-16
Payer: COMMERCIAL

## 2025-06-09 ENCOUNTER — PATIENT MESSAGE (OUTPATIENT)
Dept: FAMILY MEDICINE | Facility: CLINIC | Age: 60
End: 2025-06-09
Payer: COMMERCIAL

## 2025-06-17 ENCOUNTER — PATIENT MESSAGE (OUTPATIENT)
Dept: FAMILY MEDICINE | Facility: CLINIC | Age: 60
End: 2025-06-17
Payer: COMMERCIAL

## 2025-06-20 DIAGNOSIS — I10 HYPERTENSION, UNSPECIFIED TYPE: ICD-10-CM

## 2025-06-20 DIAGNOSIS — E78.1 PURE HYPERTRIGLYCERIDEMIA: ICD-10-CM

## 2025-06-24 RX ORDER — ICOSAPENT ETHYL 1 G/1
2 CAPSULE ORAL 2 TIMES DAILY
Qty: 360 CAPSULE | Refills: 0 | Status: SHIPPED | OUTPATIENT
Start: 2025-06-24

## 2025-06-24 RX ORDER — TRIAMTERENE AND HYDROCHLOROTHIAZIDE 37.5; 25 MG/1; MG/1
1 TABLET ORAL
Qty: 90 TABLET | Refills: 0 | Status: SHIPPED | OUTPATIENT
Start: 2025-06-24

## 2025-08-18 ENCOUNTER — OFFICE VISIT (OUTPATIENT)
Dept: FAMILY MEDICINE | Facility: CLINIC | Age: 60
End: 2025-08-18
Payer: COMMERCIAL

## 2025-08-18 VITALS
WEIGHT: 167.31 LBS | HEIGHT: 63 IN | HEART RATE: 63 BPM | BODY MASS INDEX: 29.64 KG/M2 | OXYGEN SATURATION: 98 % | DIASTOLIC BLOOD PRESSURE: 72 MMHG | SYSTOLIC BLOOD PRESSURE: 126 MMHG

## 2025-08-18 DIAGNOSIS — E78.1 PURE HYPERTRIGLYCERIDEMIA: ICD-10-CM

## 2025-08-18 DIAGNOSIS — G47.00 INSOMNIA, UNSPECIFIED TYPE: ICD-10-CM

## 2025-08-18 DIAGNOSIS — Z12.31 ENCOUNTER FOR SCREENING MAMMOGRAM FOR MALIGNANT NEOPLASM OF BREAST: ICD-10-CM

## 2025-08-18 DIAGNOSIS — I10 HYPERTENSION, UNSPECIFIED TYPE: Primary | ICD-10-CM

## 2025-08-18 PROCEDURE — 3074F SYST BP LT 130 MM HG: CPT | Mod: CPTII,S$GLB,, | Performed by: NURSE PRACTITIONER

## 2025-08-18 PROCEDURE — 99214 OFFICE O/P EST MOD 30 MIN: CPT | Mod: S$GLB,,, | Performed by: NURSE PRACTITIONER

## 2025-08-18 PROCEDURE — 3078F DIAST BP <80 MM HG: CPT | Mod: CPTII,S$GLB,, | Performed by: NURSE PRACTITIONER

## 2025-08-18 PROCEDURE — 99999 PR PBB SHADOW E&M-EST. PATIENT-LVL IV: CPT | Mod: PBBFAC,,, | Performed by: NURSE PRACTITIONER

## 2025-08-18 PROCEDURE — 1160F RVW MEDS BY RX/DR IN RCRD: CPT | Mod: CPTII,S$GLB,, | Performed by: NURSE PRACTITIONER

## 2025-08-18 PROCEDURE — 3008F BODY MASS INDEX DOCD: CPT | Mod: CPTII,S$GLB,, | Performed by: NURSE PRACTITIONER

## 2025-08-18 PROCEDURE — 1159F MED LIST DOCD IN RCRD: CPT | Mod: CPTII,S$GLB,, | Performed by: NURSE PRACTITIONER

## 2025-08-18 RX ORDER — TRIAMTERENE AND HYDROCHLOROTHIAZIDE 37.5; 25 MG/1; MG/1
1 TABLET ORAL DAILY
Qty: 90 TABLET | Refills: 0 | Status: SHIPPED | OUTPATIENT
Start: 2025-08-18

## 2025-08-18 RX ORDER — NEBIVOLOL 5 MG/1
5 TABLET ORAL DAILY
Qty: 90 TABLET | Refills: 0 | Status: SHIPPED | OUTPATIENT
Start: 2025-08-18

## 2025-08-18 RX ORDER — ICOSAPENT ETHYL 1 G/1
2 CAPSULE ORAL 2 TIMES DAILY
Qty: 360 CAPSULE | Refills: 0 | Status: SHIPPED | OUTPATIENT
Start: 2025-08-18

## 2025-08-18 RX ORDER — TRAZODONE HYDROCHLORIDE 50 MG/1
50 TABLET ORAL NIGHTLY PRN
Qty: 30 TABLET | Refills: 5 | Status: SHIPPED | OUTPATIENT
Start: 2025-08-18

## 2025-08-18 RX ORDER — AMLODIPINE BESYLATE 2.5 MG/1
2.5 TABLET ORAL DAILY
Qty: 90 TABLET | Refills: 0 | Status: SHIPPED | OUTPATIENT
Start: 2025-08-18

## 2025-09-03 ENCOUNTER — PATIENT OUTREACH (OUTPATIENT)
Dept: ADMINISTRATIVE | Facility: HOSPITAL | Age: 60
End: 2025-09-03
Payer: COMMERCIAL

## (undated) DEVICE — STRIP MEDI WND CLSR 1/2X4IN

## (undated) DEVICE — SCISSORS CURVED WITH MONOPOLAR 5DCS

## (undated) DEVICE — SOLUTION NACL 0.9% 3000ML

## (undated) DEVICE — DRAPE WARMER ORS-100

## (undated) DEVICE — GLOVE SENSICARE PI ALOE 7

## (undated) DEVICE — SYR LUER LOCK STERILE 10ML

## (undated) DEVICE — NDL SAFETY 21G X 1 1/2 ECLPSE

## (undated) DEVICE — SET TUBE PNEUMOCLEAR SE HI FLO

## (undated) DEVICE — BINDER 9 3-PANEL 30-45

## (undated) DEVICE — UNDERGLOVE BIOGEL PI MICRO BLUE SZ 6.5

## (undated) DEVICE — COVER TIP CURVED SCISSORS XI

## (undated) DEVICE — CUTTER LINEAR TLC55

## (undated) DEVICE — DISSECTOR KITTNER 13300

## (undated) DEVICE — SUT 3/0 27IN COATED VICRYL

## (undated) DEVICE — GOWN POLY REINF BRTH SLV XL

## (undated) DEVICE — DRESSING POST OP MEPILEX  AG  4X12

## (undated) DEVICE — SUTURE VICRYL 3-0 SH 27 VCP416H

## (undated) DEVICE — Device

## (undated) DEVICE — PAD BOVIE ADULT

## (undated) DEVICE — STRAP OR TABLE 5IN X 72IN

## (undated) DEVICE — TRAY GENERAL LAPAROSCOPY

## (undated) DEVICE — SUTURE SILK 3-0 SH 18 C013D

## (undated) DEVICE — PACK CYSTOSCOPY III

## (undated) DEVICE — TROCAR OPTICAL ZTHREAD 12MMX100MM CTF73

## (undated) DEVICE — JELLY LUBRICATING TUBE 4OZ 4OZLUB

## (undated) DEVICE — DRESSING POST OP MEPILEX AG 4X6  498300

## (undated) DEVICE — SUT VLOC BLU GS-22 SZ0 18IN

## (undated) DEVICE — GLOVE SENSICARE PI ALOE 6.5

## (undated) DEVICE — RELOAD PROXIMATE LINEAR CUTTER BLUE

## (undated) DEVICE — APPLICATOR CHLORAPREP ORN 26ML

## (undated) DEVICE — SUTURE SILK 2-0 30 A305H

## (undated) DEVICE — GLOVE SENSICARE PI GRN 7

## (undated) DEVICE — CUTTER LINEAR FLEX ARTICNG 45MM ATS45

## (undated) DEVICE — COVER LIGHT HANDLE LB53

## (undated) DEVICE — SCISSORS 5MM APPLIED MEDICAL   CB030

## (undated) DEVICE — SUTURE SILK 3-0 30 A304H

## (undated) DEVICE — SOL NACL IRR 1000ML BTL

## (undated) DEVICE — SUT V-LOC 180 2-0 GS-22 9IN

## (undated) DEVICE — SUTURE SILK 0 30 A306H

## (undated) DEVICE — SUT STRATAFIX PDO 2-0 SH

## (undated) DEVICE — SUTURE SILK 2-0 SH 18 CR C012D

## (undated) DEVICE — ELECTRODE BLADE INSULATED 1 IN

## (undated) DEVICE — SOLUTION IRRI NS BOTTLE 1000ML R5200-01

## (undated) DEVICE — ELECTRODE REM PLYHSV RETURN 9

## (undated) DEVICE — GUIDEWIRE URO STRGHT 3CM TIP.035X150CM

## (undated) DEVICE — DRAPE COLUMN DAVINCI XI

## (undated) DEVICE — STAPLER ILS CURVED

## (undated) DEVICE — TROCAR OPTICAL ZTHREAD 5MMX100MM CTF03

## (undated) DEVICE — SLEEVE SCD EXPRESS KNEE MEDIUM

## (undated) DEVICE — CATHETER URETERAL OPEN TIP 5FX70

## (undated) DEVICE — SEAL UNIVERSAL 5MM-8MM XI

## (undated) DEVICE — SUCTION/IRRIGATOR W/TIP

## (undated) DEVICE — CABLE MONOPOLAR 10FT DISPOSABLE

## (undated) DEVICE — RETRACTOR ACCESS SMALL FLR01

## (undated) DEVICE — PACK LITHOTOMY 88521

## (undated) DEVICE — BABCOCK WITH RATCHET HANDLE 5MM 5BB

## (undated) DEVICE — PACK CUSTOM UNIV BASIN SLI

## (undated) DEVICE — SOL CLEARIFY VISUALIZATION LAP

## (undated) DEVICE — TRAY CATH FOL SIL URIMTR 16FR

## (undated) DEVICE — SOLUTION IRRI H2O BOTTLE 1000ML

## (undated) DEVICE — CAP SEAL W/ACCESSORIES HAP02

## (undated) DEVICE — TUBING INSUFFLATION 10FT

## (undated) DEVICE — TROCAR ENDOPATH XCEL 5X100MM

## (undated) DEVICE — RELOAD LINEAR 6R45B

## (undated) DEVICE — SEALER LIGASURE 20CM LS1020

## (undated) DEVICE — SUT MONOCRYL 4-0 PS-2

## (undated) DEVICE — SOL ELECTROLUBE ANTI-STIC

## (undated) DEVICE — OBTURATOR BLADELESS 8MM XI CLR

## (undated) DEVICE — TOWEL OR DISP STRL BLUE 4/PK

## (undated) DEVICE — SYR 10CC LUER LOCK

## (undated) DEVICE — COVER PROXIMA MAYO STAND

## (undated) DEVICE — DRAPE ABDOMINAL TIBURON 14X11

## (undated) DEVICE — SUTURE PDS II 0 CTX 60 Z990G

## (undated) DEVICE — DRAPE INCISE IOBAN 2 23X17IN

## (undated) DEVICE — PACK SIRUS BASIC V SURG STRL

## (undated) DEVICE — NDL INJETAK ADJ TIP 70CM

## (undated) DEVICE — CLOSURE SKIN STERI STRIP 1/2X4

## (undated) DEVICE — GLOVE SENSICARE PI ALOE 7.5

## (undated) DEVICE — GLOVE BIOGEL PI ULTRA TOUCH G GRAY SZ 7

## (undated) DEVICE — DRAPE ARM DAVINCI XI